# Patient Record
Sex: FEMALE | Race: WHITE | NOT HISPANIC OR LATINO | Employment: FULL TIME | URBAN - METROPOLITAN AREA
[De-identification: names, ages, dates, MRNs, and addresses within clinical notes are randomized per-mention and may not be internally consistent; named-entity substitution may affect disease eponyms.]

---

## 2017-01-10 ENCOUNTER — OFFICE VISIT (OUTPATIENT)
Dept: MEDICAL GROUP | Facility: PHYSICIAN GROUP | Age: 57
End: 2017-01-10
Payer: COMMERCIAL

## 2017-01-10 VITALS
HEIGHT: 62 IN | DIASTOLIC BLOOD PRESSURE: 68 MMHG | WEIGHT: 156 LBS | HEART RATE: 76 BPM | BODY MASS INDEX: 28.71 KG/M2 | SYSTOLIC BLOOD PRESSURE: 112 MMHG | RESPIRATION RATE: 16 BRPM | TEMPERATURE: 98.5 F | OXYGEN SATURATION: 97 %

## 2017-01-10 DIAGNOSIS — E11.69 DYSLIPIDEMIA ASSOCIATED WITH TYPE 2 DIABETES MELLITUS (HCC): ICD-10-CM

## 2017-01-10 DIAGNOSIS — E11.59 HYPERTENSION ASSOCIATED WITH DIABETES (HCC): ICD-10-CM

## 2017-01-10 DIAGNOSIS — I15.2 HYPERTENSION ASSOCIATED WITH DIABETES (HCC): ICD-10-CM

## 2017-01-10 DIAGNOSIS — E78.5 DYSLIPIDEMIA ASSOCIATED WITH TYPE 2 DIABETES MELLITUS (HCC): ICD-10-CM

## 2017-01-10 DIAGNOSIS — Z00.00 HEALTH CARE MAINTENANCE: ICD-10-CM

## 2017-01-10 DIAGNOSIS — E55.9 VITAMIN D INSUFFICIENCY: ICD-10-CM

## 2017-01-10 PROCEDURE — 99214 OFFICE O/P EST MOD 30 MIN: CPT | Performed by: NURSE PRACTITIONER

## 2017-01-10 NOTE — MR AVS SNAPSHOT
"        Tammy Alejo   1/10/2017 11:15 AM   Office Visit   MRN: [id number]    Department:  [facility] ([place]   Dept Phone:  [phone]    Description:  Female : 1960   Provider:  JEROD Sandoval           Reason for Visit     Diabetes follow up     Hypertension           Allergies as of 1/10/2017     Allergen Noted Reactions    Januvia [Sitagliptin] 2015   Unspecified    Headaches, nausea, vomiting    Sulfa Drugs 2013       Tape 2016         You were diagnosed with     Uncontrolled type 2 diabetes mellitus without complication, without long-term current use of insulin ([facility])   [6488838]       Health care maintenance   [620456]       Hypertension associated with diabetes (HCC)   [386186]       Dyslipidemia associated with type 2 diabetes mellitus ([facility])   [213793]       Vitamin D insufficiency   [139800]         Vital Signs     Blood Pressure Pulse Temperature Respirations Height Weight    112/68 mmHg 76 36.9 °C (98.5 °F) 16 1.568 m (5' 1.75\") 70.761 kg (156 lb)    Body Mass Index Oxygen Saturation Smoking Status             28.78 kg/m2 97% Never Smoker          Basic Information     Date Of Birth Sex Race Ethnicity Preferred Language    1960 Female White Unknown English      Your appointments     2017 11:15 AM   Established Patient with JEROD Sandoval   Former[facility])    [address] [place] [address], [address]  [facility] [address] [phone]           You will be receiving a confirmation call a few days before your appointment from our automated call confirmation system.              Problem List              ICD-10-CM Priority Class Noted - Resolved    Uncontrolled type 2 diabetes mellitus without complication, without long-term current use of insulin ([facility]) E11.65   6/3/2013 - Present    Hypertension associated with diabetes (HCC) E11.59, I10   6/3/2013 - Present    Dyslipidemia associated with type 2 diabetes " mellitus (HCC) E11.69, E78.5   2/10/2016 - Present    Vitamin D insufficiency E55.9   10/13/2016 - Present    Health care maintenance Z00.00   10/13/2016 - Present      Health Maintenance        Date Due Completion Dates    IMM HEP B VACCINE (1 of 3 - Primary Series) 1960 ---    IMM DTaP/Tdap/Td Vaccine (1 - Tdap) 6/18/1979 ---    IMM PNEUMOCOCCAL 19-64 (ADULT) MEDIUM RISK SERIES (1 of 1 - PPSV23) 6/18/1979 ---    IMM INFLUENZA (1) 9/1/2016 ---    A1C SCREENING 1/14/2017 7/14/2016, 10/29/2015, 3/12/2015, 9/11/2014    DIABETES MONOFILAMTENT / LE EXAM 2/10/2017 2/10/2016, 9/11/2014 (Done)    Override on 9/11/2014: Done    RETINAL SCREENING 2/26/2017 2/26/2016, 12/9/2015, 12/10/2014    MAMMOGRAM 3/20/2017 3/20/2015    URINE ACR / MICROALBUMIN 7/14/2017 7/14/2016, 3/12/2015    COLON CANCER SCREENING ANNUAL FIT 10/16/2017 10/16/2016    FASTING LIPID PROFILE 11/3/2017 11/3/2016, 7/14/2016, 10/29/2015, 11/13/2014, 11/13/2013    SERUM CREATININE 11/3/2017 11/3/2016, 7/14/2016, 10/29/2015, 11/13/2014, 11/13/2013    PAP SMEAR 11/14/2019 11/14/2016            Current Immunizations     No immunizations on file.      Below and/or attached are the medications your provider expects you to take. Review all of your home medications and newly ordered medications with your provider and/or pharmacist. Follow medication instructions as directed by your provider and/or pharmacist. Please keep your medication list with you and share with your provider. Update the information when medications are discontinued, doses are changed, or new medications (including over-the-counter products) are added; and carry medication information at all times in the event of emergency situations     Allergies:  JANUVIA - Unspecified     SULFA DRUGS - (reactions not documented)     TAPE - (reactions not documented)               Medications  Valid as of: January 10, 2017 - 12:00 PM    Generic Name Brand Name Tablet Size Instructions for use     Acetaminophen   Take  by mouth.        Atorvastatin Calcium (Tab) LIPITOR 10 MG Take 1 Tab by mouth every day.        Cetirizine HCl   Take  by mouth.        Cinnamon   Take  by mouth.        Fish Oil (Oil) Fish Oil  by Does not apply route.        GlipiZIDE (Tab) GLUCOTROL 5 MG Take 1 Tab by mouth every day. Indications: Type 2 Diabetes        Lisinopril-Hydrochlorothiazide (Tab) PRINZIDE, ZESTORETIC 20-25 MG Take 1 Tab by mouth every day.        Magnesium Gluconate   Take  by mouth.        MetFORMIN HCl (Tab) GLUCOPHAGE 1000 MG Take 1 Tab by mouth 2 times a day, with meals. Indications: Type 2 Diabetes        Potassium   Take  by mouth.        .                 Medicines prescribed today were sent to:     [facility] [address] [place] [id number]    Phone: [phone] Fax: [phone]    Open 24 Hours?: No      Medication refill instructions:       If your prescription bottle indicates you have medication refills left, it is not necessary to call your provider’s office. Please contact your pharmacy and they will refill your medication.    If your prescription bottle indicates you do not have any refills left, you may request refills at any time through one of the following ways: The online [facility] [employer] system (except Urgent Care), by calling your provider’s office, or by asking your pharmacy to contact your provider’s office with a refill request. Medication refills are processed only during regular business hours and may not be available until the next business day. Your provider may request additional information or to have a follow-up visit with you prior to refilling your medication.   *Please Note: Medication refills are assigned a new Rx number when refilled electronically. Your pharmacy may indicate that no refills were authorized even though a new prescription for the same medication is available at the pharmacy. Please request the medicine by name  with the pharmacy before contacting your provider for a refill.           MyChart Access Code: Activation code not generated  Current MyChart Status: Active

## 2017-01-10 NOTE — PROGRESS NOTES
Subjective:     Chief Complaint   Patient presents with   • Diabetes     follow up    • Hypertension        Tammy Alejo is a 56 y.o. female here today for evaluation and management of:    Uncontrolled type 2 diabetes mellitus without complication, without long-term current use of insulin (HCC)  Diagnosed 2008. Managed with metformin 1000 mg bid and glipizide 5 mg daily. Lipitor 10 mg daily.  Also takes cinnamon capsules.  No regular glucose testing.  Last A1c October 2015 6.7, 7.4 in July 2016  Did not visit lab before appt due to weather.      Health care maintenance  She had gyn appointment for exam and pap was negative and did complete FIT which was also negative.   Still declines colonoscopy and immunizations.      Hypertension associated with diabetes (HCC)  Managed with lisinopril 20 mg daily and HCTZ 12.5mg daily. Has monitoring device and does check at home.      Dyslipidemia associated with type 2 diabetes mellitus (HCC)   managed with Lipitor 10 mg daily    Vitamin D insufficiency  Now supplementing with vitamin d 1000 iu daily.         ROS   Denies HA, chest pain, shortness of breath, abdominal pain, bladder or bowel changes, lower extremity edema.    Current medicines (including changes today)  Current Outpatient Prescriptions   Medication Sig Dispense Refill   • lisinopril-hydrochlorothiazide (PRINZIDE, ZESTORETIC) 20-25 MG per tablet Take 1 Tab by mouth every day. 90 Tab 1   • atorvastatin (LIPITOR) 10 MG Tab Take 1 Tab by mouth every day. 90 Tab 3   • metformin (GLUCOPHAGE) 1000 MG tablet Take 1 Tab by mouth 2 times a day, with meals. Indications: Type 2 Diabetes 180 Tab 1   • glipiZIDE (GLUCOTROL) 5 MG Tab Take 1 Tab by mouth every day. Indications: Type 2 Diabetes 90 Tab 1   • Cetirizine HCl (ZYRTEC ALLERGY PO) Take  by mouth.     • Fish Oil Oil by Does not apply route.     • POTASSIUM PO Take  by mouth.     • Acetaminophen (TYLENOL PO) Take  by mouth.     • MAGNESIUM GLUCONATE PO Take  " by mouth.     • CINNAMON PO Take  by mouth.       No current facility-administered medications for this visit.       She  has a past medical history of Allergy; Arthritis; Diabetes; Diabetic neuropathy (HCC); Hypertension; Dyslipidemia associated with type 2 diabetes mellitus (HCC) (2/10/2016); Heart murmur; and Arrhythmia.    Allergies Januvia; Sulfa drugs; and Tape    Current medications, problem list, allergies, past medical history, and tobacco use history reviewed in [facility] today.    Health maintenance reviewed and updated.    Objective:   Blood pressure 112/68, pulse 76, temperature 36.9 °C (98.5 °F), resp. rate 16, height 1.568 m (5' 1.75\"), weight 70.761 kg (156 lb), SpO2 97 %. Body mass index is 28.78 kg/(m^2).     Physical Exam   Constitutional: Alert, no acute distress. Pleasant and cooperative with the examination.  Skin:   Warm, dry, no rashes in visible areas.    Eyes:   Pupils equal, round. Conjunctiva and sclera clear,    Lids normal.  ENT:  Pinna normal.   Neck:   Supple, trachea midline.  Lungs:  Normal effort and respirations. Clear to auscultation bilaterally.  CV:  Regular rate and rhythm.  MS/Ext:  Steady gait, no edema.  Psych:  Eye contact is good, affect calm.    Assessment and Plan:   The following treatment plan was discussed    1. Uncontrolled type 2 diabetes mellitus without complication, without long-term current use of insulin (HCC)  Continue current medications and monitoring, have labs as previously ordered. Reviewed healthy tracks.     2. Health care maintenance  Counseled regarding HM and colonoscopy.    3. Hypertension associated with diabetes (HCC)  Stable. Continue current medicines. Monitor labs regularly.        4. Dyslipidemia associated with type 2 diabetes mellitus (HCC)  Stable. Continue current medicines. Monitor labs regularly.        5. Vitamin D insufficiency  Unknown control. Have labs as previously ordered and continue supplement.      Followup: Return in about 3 " months (around 4/10/2017).  As scheduled, sooner if symptoms don't resolve or with any new problems.         Reviewed side effects, risks, and benefits of medications prescribed today.  Advised to take all medications as instructed and report any side effects.   The patient voices understanding and agrees.  Report any new or worsening symptoms.  Have labs or other diagnostic studies prior to follow up.  Keep all appointments for any referrals given.      Please note this dictation was created using voice recognition software. Every reasonable attempt has been made to correct obvious errors, however there may be errors of grammar and possibly content that were not discovered before finalizing the note.

## 2017-01-10 NOTE — ASSESSMENT & PLAN NOTE
Diagnosed 2008. Managed with metformin 1000 mg bid and glipizide 5 mg daily. Lipitor 10 mg daily.  Also takes cinnamon capsules.  No regular glucose testing.  Last A1c October 2015 6.7, 7.4 in July 2016  Did not visit lab before appt due to weather.

## 2017-01-10 NOTE — ASSESSMENT & PLAN NOTE
She had gyn appointment for exam and pap was negative and did complete FIT which was also negative.   Still declines colonoscopy and immunizations.

## 2017-01-10 NOTE — ASSESSMENT & PLAN NOTE
Managed with lisinopril 20 mg daily and HCTZ 12.5mg daily. Has monitoring device and does check at home.

## 2017-01-31 ENCOUNTER — HOSPITAL ENCOUNTER (OUTPATIENT)
Dept: LAB | Facility: MEDICAL CENTER | Age: 57
End: 2017-01-31
Attending: NURSE PRACTITIONER
Payer: COMMERCIAL

## 2017-01-31 DIAGNOSIS — E11.69 DYSLIPIDEMIA ASSOCIATED WITH TYPE 2 DIABETES MELLITUS (HCC): ICD-10-CM

## 2017-01-31 DIAGNOSIS — E55.9 VITAMIN D INSUFFICIENCY: ICD-10-CM

## 2017-01-31 DIAGNOSIS — E78.5 DYSLIPIDEMIA ASSOCIATED WITH TYPE 2 DIABETES MELLITUS (HCC): ICD-10-CM

## 2017-01-31 LAB
25(OH)D3 SERPL-MCNC: 25 NG/ML (ref 30–100)
ALBUMIN SERPL BCP-MCNC: 4.7 G/DL (ref 3.2–4.9)
ALBUMIN/GLOB SERPL: 1.7 G/DL
ALP SERPL-CCNC: 52 U/L (ref 30–99)
ALT SERPL-CCNC: 19 U/L (ref 2–50)
ANION GAP SERPL CALC-SCNC: 4 MMOL/L (ref 0–11.9)
AST SERPL-CCNC: 19 U/L (ref 12–45)
BILIRUB SERPL-MCNC: 1.3 MG/DL (ref 0.1–1.5)
BUN SERPL-MCNC: 21 MG/DL (ref 8–22)
CALCIUM SERPL-MCNC: 10 MG/DL (ref 8.5–10.5)
CHLORIDE SERPL-SCNC: 101 MMOL/L (ref 96–112)
CHOLEST SERPL-MCNC: 136 MG/DL (ref 100–199)
CO2 SERPL-SCNC: 30 MMOL/L (ref 20–33)
CREAT SERPL-MCNC: 0.77 MG/DL (ref 0.5–1.4)
EST. AVERAGE GLUCOSE BLD GHB EST-MCNC: 169 MG/DL
GLOBULIN SER CALC-MCNC: 2.8 G/DL (ref 1.9–3.5)
GLUCOSE SERPL-MCNC: 160 MG/DL (ref 65–99)
HBA1C MFR BLD: 7.5 % (ref 0–5.6)
HDLC SERPL-MCNC: 55 MG/DL
LDLC SERPL CALC-MCNC: 59 MG/DL
POTASSIUM SERPL-SCNC: 3.6 MMOL/L (ref 3.6–5.5)
PROT SERPL-MCNC: 7.5 G/DL (ref 6–8.2)
SODIUM SERPL-SCNC: 135 MMOL/L (ref 135–145)
TRIGL SERPL-MCNC: 109 MG/DL (ref 0–149)

## 2017-01-31 PROCEDURE — 80053 COMPREHEN METABOLIC PANEL: CPT

## 2017-01-31 PROCEDURE — 82306 VITAMIN D 25 HYDROXY: CPT

## 2017-01-31 PROCEDURE — 80061 LIPID PANEL: CPT

## 2017-01-31 PROCEDURE — 83036 HEMOGLOBIN GLYCOSYLATED A1C: CPT

## 2017-01-31 PROCEDURE — 36415 COLL VENOUS BLD VENIPUNCTURE: CPT

## 2017-04-12 RX ORDER — GLIPIZIDE 5 MG/1
TABLET ORAL
Qty: 90 TAB | Refills: 0 | Status: SHIPPED | OUTPATIENT
Start: 2017-04-12 | End: 2017-08-07

## 2017-04-12 RX ORDER — LISINOPRIL AND HYDROCHLOROTHIAZIDE 25; 20 MG/1; MG/1
TABLET ORAL
Qty: 90 TAB | Refills: 0 | Status: SHIPPED | OUTPATIENT
Start: 2017-04-12 | End: 2017-07-23 | Stop reason: SDUPTHER

## 2017-04-12 NOTE — TELEPHONE ENCOUNTER
Was the patient seen in the last year in this department? Yes     Does patient have an active prescription for medications requested? No     Received Request Via: Pharmacy      Pt met protocol?: Yes    A1C 1/17    BP Readings from Last 1 Encounters:   01/10/17 112/68

## 2017-04-24 ENCOUNTER — OFFICE VISIT (OUTPATIENT)
Dept: MEDICAL GROUP | Facility: PHYSICIAN GROUP | Age: 57
End: 2017-04-24
Payer: COMMERCIAL

## 2017-04-24 VITALS
TEMPERATURE: 98.1 F | OXYGEN SATURATION: 98 % | SYSTOLIC BLOOD PRESSURE: 124 MMHG | WEIGHT: 154 LBS | BODY MASS INDEX: 28.34 KG/M2 | HEART RATE: 72 BPM | HEIGHT: 62 IN | DIASTOLIC BLOOD PRESSURE: 74 MMHG | RESPIRATION RATE: 14 BRPM

## 2017-04-24 DIAGNOSIS — E11.59 HYPERTENSION ASSOCIATED WITH DIABETES (HCC): ICD-10-CM

## 2017-04-24 DIAGNOSIS — E11.69 DYSLIPIDEMIA ASSOCIATED WITH TYPE 2 DIABETES MELLITUS (HCC): ICD-10-CM

## 2017-04-24 DIAGNOSIS — E78.5 DYSLIPIDEMIA ASSOCIATED WITH TYPE 2 DIABETES MELLITUS (HCC): ICD-10-CM

## 2017-04-24 DIAGNOSIS — Z00.00 HEALTH CARE MAINTENANCE: ICD-10-CM

## 2017-04-24 DIAGNOSIS — I15.2 HYPERTENSION ASSOCIATED WITH DIABETES (HCC): ICD-10-CM

## 2017-04-24 DIAGNOSIS — E55.9 VITAMIN D INSUFFICIENCY: ICD-10-CM

## 2017-04-24 PROCEDURE — 99214 OFFICE O/P EST MOD 30 MIN: CPT | Performed by: NURSE PRACTITIONER

## 2017-04-24 NOTE — ASSESSMENT & PLAN NOTE
Diagnosed 2008. Managed with metformin 1000 mg bid and glipizide 5 mg which she splits and takes bid. Has been checking glucose at home 140-320. Notices glucose readings seem higher than before. Does limit carbs.  Job as  keeps her active. Summer gardening and swimming.

## 2017-04-24 NOTE — Clinical Note
[email or web address]  JEROD Sandoval  [address]  [place] [address]  Fax: [phone]   Authorization for Release/Disclosure of   Protected Health Information   Name: KORI DIAZ : 1960 SSN: [id number]   Address: [address] [place] [address] Phone:    [phone] (home)    I authorize the entity listed below to release/disclose the PHI below to:   [facility]/JEROD Sandoval and JEROD Sandoval   Provider or Entity Name:  Dr. Micah Carson     Address [address] [place] [address]   Phone: [phone]      Fax: [phone]     Reason for request: continuity of care   Information to be released:    [  ] LAST COLONOSCOPY,  including any PATH REPORT and follow-up  [  ] LAST FIT/COLOGUARD RESULT [  ] LAST DEXA  [  ] LAST MAMMOGRAM  [  ] LAST PAP  [  ] LAST LABS [XXX ] RETINA EXAM REPORT  [  ] IMMUNIZATION RECORDS  [  ] Release all info      [  ] Check here and initial the line next to each item to release ALL health information INCLUDING  _____ Care and treatment for drug and / or alcohol abuse  _____ HIV testing, infection status, or AIDS  _____ Genetic Testing    DATES OF SERVICE OR TIME PERIOD TO BE DISCLOSED: _____________  I understand and acknowledge that:  * This Authorization may be revoked at any time by you in writing, except if your health information has already been used or disclosed.  * Your health information that will be used or disclosed as a result of you signing this authorization could be re-disclosed by the recipient. If this occurs, your re-disclosed health information may no longer be protected by State or Federal laws.  * You may refuse to sign this Authorization. Your refusal will not affect your ability to obtain treatment.  * This Authorization becomes effective upon signing and will  on (date) __________.      If no date is indicated, this Authorization will   one (1) year from the signature date.    Name: Tammy Ramirezsusana    Signature:   Date:     2017       PLEASE FAX REQUESTED RECORDS BACK TO: [phone]

## 2017-04-24 NOTE — PROGRESS NOTES
Subjective:     Chief Complaint   Patient presents with   • Diabetes     follow up         Tammy Alejo is a 56 y.o. female here today for evaluation and management of:    Hypertension associated with diabetes (HCC)  Managed with lisinopril-HCTZ 20-25 mg daily. Has monitoring device and does check at home.      Dyslipidemia associated with type 2 diabetes mellitus (HCC)   managed with Lipitor 10 mg daily     Vitamin D insufficiency  Now supplementing with vitamin d 2000 iu daily.    Uncontrolled type 2 diabetes mellitus without complication, without long-term current use of insulin (CMS-HCC)  Diagnosed 2008. Managed with metformin 1000 mg bid and glipizide 5 mg which she splits and takes bid. Has been checking glucose at home 140-320. Notices glucose readings seem higher than before. Does limit carbs.  Job as  keeps her active. Summer gardening and swimming.            ROS  Denies HA, chest pain, shortness of breath, abdominal pain, bladder or bowel changes, lower extremity edema.    Current medicines (including changes today)  Current Outpatient Prescriptions   Medication Sig Dispense Refill   • Empagliflozin (JARDIANCE) 25 MG Tab Take 25 mg by mouth every day. 30 Tab 5   • glipiZIDE (GLUCOTROL) 5 MG Tab TAKE ONE TABLET BY MOUTH ONCE DAILY (TYPE  2  DIABETES) 90 Tab 0   • metformin (GLUCOPHAGE) 1000 MG tablet TAKE ONE TABLET BY MOUTH TWICE DAILY WITH MEALS (TYPE  2  DIABETES) 180 Tab 0   • lisinopril-hydrochlorothiazide (PRINZIDE, ZESTORETIC) 20-25 MG per tablet TAKE ONE TABLET BY MOUTH ONCE DAILY 90 Tab 0   • atorvastatin (LIPITOR) 10 MG Tab Take 1 Tab by mouth every day. 90 Tab 3   • Cetirizine HCl (ZYRTEC ALLERGY PO) Take  by mouth.     • Fish Oil Oil by Does not apply route.     • POTASSIUM PO Take  by mouth.     • Acetaminophen (TYLENOL PO) Take  by mouth.     • MAGNESIUM GLUCONATE PO Take  by mouth.     • CINNAMON PO Take  by mouth.       No current facility-administered medications for  "this visit.       She  has a past medical history of Allergy; Arthritis; Diabetes; Diabetic neuropathy (CMS-HCC); Hypertension; Dyslipidemia associated with type 2 diabetes mellitus ([facility]) (2/10/2016); Heart murmur; and Arrhythmia.    Allergies Januvia; Sulfa drugs; and Tape    Current medications, problem list, allergies, past medical history, and tobacco use history reviewed in [employer] today.    Health maintenance reviewed and updated.    Objective:   Blood pressure 124/74, pulse 72, temperature 36.7 °C (98.1 °F), resp. rate 14, height 1.568 m (5' 1.75\"), weight 69.854 kg (154 lb), SpO2 98 %. Body mass index is 28.41 kg/(m^2).     Physical Exam   Constitutional: Alert, no acute distress. Pleasant and cooperative with the examination.  Skin:   Warm, dry, no rashes in visible areas.    Eyes:   Pupils equal, round. Conjunctiva and sclera clear,    Lids normal.  ENT:  Pinna normal.   Neck:   Supple, trachea midline.  Lungs:  Normal effort and respirations. Clear to auscultation bilaterally.  CV:  Regular rate and rhythm.  MS/Ext:  Steady gait, no edema.  Psych:  Eye contact is good, affect calm.    Assessment and Plan:   The following treatment plan was discussed    1. Uncontrolled type 2 diabetes mellitus without complication, without long-term current use of insulin ([facility])  Not well controlled. Reviewed lifestyle modifications. Start jardiance, call with any concerns.  - COMP METABOLIC PANEL; Future  - HEMOGLOBIN A1C; Future  - Empagliflozin (JARDIANCE) 25 MG Tab; Take 25 mg by mouth every day.  Dispense: 30 Tab; Refill: 5    2. Hypertension associated with diabetes (HCC)  Stable. Continue current medicines. Monitor labs regularly.        3. Dyslipidemia associated with type 2 diabetes mellitus (HCC)  Stable. Continue current medicines. Monitor labs regularly.      - LIPID PROFILE; Future    4. Vitamin D insufficiency  Continue 2000 iu daily and repeat labs.  - VITAMIN D,25 HYDROXY; Future    5. Health care " maintenance    - MA-SCREEN MAMMO W/CAD-BILAT      Followup: Return in about 3 months (around 7/24/2017).  As scheduled, sooner if symptoms don't resolve or with any new problems.       Reviewed side effects, risks, and benefits of medications prescribed today.  Advised to take all medications as instructed and report any side effects.   The patient voices understanding and agrees.  Report any new or worsening symptoms.  Have labs or other diagnostic studies prior to follow up.  Keep all appointments for any referrals given.      Please note this dictation was created using voice recognition software. Every reasonable attempt has been made to correct obvious errors, however there may be errors of grammar and possibly content that were not discovered before finalizing the note.

## 2017-04-24 NOTE — MR AVS SNAPSHOT
"        Tammy Alejo   2017 11:15 AM   Office Visit   MRN: [id number]    Department:  [facility]   Dept Phone:  [phone]    Description:  Female : 1960   Provider:  JEROD Sandoval           Reason for Visit     Diabetes follow up       Allergies as of 2017     Allergen Noted Reactions    Januvia [Sitagliptin] 2015   Unspecified    Headaches, nausea, vomiting    Sulfa Drugs 2013       Tape 2016         You were diagnosed with     Health care maintenance   [356983]       Uncontrolled type 2 diabetes mellitus without complication, without long-term current use of insulin (CMS-[facility])   [6513776]       Hypertension associated with diabetes (CMS-[facility])   [954512]       Dyslipidemia associated with type 2 diabetes mellitus (CMS-[facility])   [173658]       Vitamin D insufficiency   [355927]         Vital Signs     Blood Pressure Pulse Temperature Respirations Height Weight    124/74 mmHg 72 36.7 °C (98.1 °F) 14 1.568 m (5' 1.75\") 69.854 kg (154 lb)    Body Mass Index Oxygen Saturation Smoking Status             28.41 kg/m2 98% Never Smoker          Basic Information     Date Of Birth Sex Race Ethnicity Preferred Language    1960 Female White Unknown English      Your appointments     Aug 07, 2017 11:15 AM   Established Patient with JEROD Sandoval   [facility])    [address] [place] [address], [address]  [facility] [id number] [phone]           You will be receiving a confirmation call a few days before your appointment from our automated call confirmation system.              Problem List              ICD-10-CM Priority Class Noted - Resolved    Uncontrolled type 2 diabetes mellitus without complication, without long-term current use of insulin (CMS-[facility]) E11.65   6/3/2013 - Present    Hypertension associated with diabetes (HCC) E11.59, I10   6/3/2013 - Present    Dyslipidemia associated with type 2 diabetes mellitus " (HCC) E11.69, E78.5   2/10/2016 - Present    Vitamin D insufficiency E55.9   10/13/2016 - Present    Health care maintenance Z00.00   10/13/2016 - Present      Health Maintenance        Date Due Completion Dates    IMM HEP B VACCINE (1 of 3 - Primary Series) 1960 ---    IMM DTaP/Tdap/Td Vaccine (1 - Tdap) 6/18/1979 ---    IMM PNEUMOCOCCAL 19-64 (ADULT) MEDIUM RISK SERIES (1 of 1 - PPSV23) 6/18/1979 ---    DIABETES MONOFILAMENT / LE EXAM 2/10/2017 2/10/2016, 9/11/2014 (Done)    Override on 9/11/2014: Done    MAMMOGRAM 3/20/2017 3/20/2015    URINE ACR / MICROALBUMIN 7/14/2017 7/14/2016, 3/12/2015    A1C SCREENING 7/31/2017 1/31/2017, 7/14/2016, 10/29/2015, 3/12/2015, 9/11/2014    RETINAL SCREENING 9/16/2017 9/16/2016, 2/26/2016, 12/9/2015, 12/10/2014    COLON CANCER SCREENING ANNUAL FIT 10/16/2017 10/16/2016    FASTING LIPID PROFILE 1/31/2018 1/31/2017, 11/3/2016, 7/14/2016, 10/29/2015, 11/13/2014, 11/13/2013    SERUM CREATININE 1/31/2018 1/31/2017, 11/3/2016, 7/14/2016, 10/29/2015, 11/13/2014, 11/13/2013    PAP SMEAR 11/14/2019 11/14/2016            Current Immunizations     No immunizations on file.      Below and/or attached are the medications your provider expects you to take. Review all of your home medications and newly ordered medications with your provider and/or pharmacist. Follow medication instructions as directed by your provider and/or pharmacist. Please keep your medication list with you and share with your provider. Update the information when medications are discontinued, doses are changed, or new medications (including over-the-counter products) are added; and carry medication information at all times in the event of emergency situations     Allergies:  JANUVIA - Unspecified     SULFA DRUGS - (reactions not documented)     TAPE - (reactions not documented)               Medications  Valid as of: April 24, 2017 - 12:07 PM    Generic Name Brand Name Tablet Size Instructions for use     Acetaminophen   Take  by mouth.        Atorvastatin Calcium (Tab) LIPITOR 10 MG Take 1 Tab by mouth every day.        Cetirizine HCl   Take  by mouth.        Cinnamon   Take  by mouth.        Fish Oil (Oil) Fish Oil  by Does not apply route.        GlipiZIDE (Tab) GLUCOTROL 5 MG TAKE ONE TABLET BY MOUTH ONCE DAILY (TYPE  2  DIABETES)        Lisinopril-Hydrochlorothiazide (Tab) PRINZIDE, ZESTORETIC 20-25 MG TAKE ONE TABLET BY MOUTH ONCE DAILY        Magnesium Gluconate   Take  by mouth.        MetFORMIN HCl (Tab) GLUCOPHAGE 1000 MG TAKE ONE TABLET BY MOUTH TWICE DAILY WITH MEALS (TYPE  2  DIABETES)        Potassium   Take  by mouth.        .                 Medicines prescribed today were sent to:     [facility] [address] [place] [address] [address] [id number] [facility] [address]    Phone: [phone] Fax: [phone]    Open 24 Hours?: No      Medication refill instructions:       If your prescription bottle indicates you have medication refills left, it is not necessary to call your provider’s office. Please contact your pharmacy and they will refill your medication.    If your prescription bottle indicates you do not have any refills left, you may request refills at any time through one of the following ways: The online [employer] system (except Urgent Care), by calling your provider’s office, or by asking your pharmacy to contact your provider’s office with a refill request. Medication refills are processed only during regular business hours and may not be available until the next business day. Your provider may request additional information or to have a follow-up visit with you prior to refilling your medication.   *Please Note: Medication refills are assigned a new Rx number when refilled electronically. Your pharmacy may indicate that no refills were authorized even though a new prescription for the same medication is available at the pharmacy. Please request the medicine by name with  the pharmacy before contacting your provider for a refill.        Your To Do List     Future Labs/Procedures Complete By Expires    COMP METABOLIC PANEL  As directed 4/25/2018    Comments:    8 hour fast, plain water only    HEMOGLOBIN A1C  As directed 4/25/2018    LIPID PROFILE  As directed 4/25/2018    Comments:    10 hour fast, plain water only    VITAMIN D,25 HYDROXY  As directed 4/25/2018      Other Notes About Your Plan     dddd           MyChart Access Code: Activation code not generated  Current MyChart Status: Active

## 2017-05-02 ENCOUNTER — HOSPITAL ENCOUNTER (OUTPATIENT)
Dept: LAB | Facility: MEDICAL CENTER | Age: 57
End: 2017-05-02
Attending: NURSE PRACTITIONER
Payer: COMMERCIAL

## 2017-05-02 DIAGNOSIS — E78.5 DYSLIPIDEMIA ASSOCIATED WITH TYPE 2 DIABETES MELLITUS (HCC): ICD-10-CM

## 2017-05-02 DIAGNOSIS — E11.69 DYSLIPIDEMIA ASSOCIATED WITH TYPE 2 DIABETES MELLITUS (HCC): ICD-10-CM

## 2017-05-02 DIAGNOSIS — E55.9 VITAMIN D INSUFFICIENCY: ICD-10-CM

## 2017-05-02 LAB
25(OH)D3 SERPL-MCNC: 28 NG/ML (ref 30–100)
ALBUMIN SERPL BCP-MCNC: 4.5 G/DL (ref 3.2–4.9)
ALBUMIN/GLOB SERPL: 1.5 G/DL
ALP SERPL-CCNC: 46 U/L (ref 30–99)
ALT SERPL-CCNC: 17 U/L (ref 2–50)
ANION GAP SERPL CALC-SCNC: 10 MMOL/L (ref 0–11.9)
AST SERPL-CCNC: 19 U/L (ref 12–45)
BILIRUB SERPL-MCNC: 1.4 MG/DL (ref 0.1–1.5)
BUN SERPL-MCNC: 19 MG/DL (ref 8–22)
CALCIUM SERPL-MCNC: 9.4 MG/DL (ref 8.5–10.5)
CHLORIDE SERPL-SCNC: 101 MMOL/L (ref 96–112)
CHOLEST SERPL-MCNC: 123 MG/DL (ref 100–199)
CO2 SERPL-SCNC: 28 MMOL/L (ref 20–33)
CREAT SERPL-MCNC: 0.81 MG/DL (ref 0.5–1.4)
EST. AVERAGE GLUCOSE BLD GHB EST-MCNC: 197 MG/DL
GFR SERPL CREATININE-BSD FRML MDRD: >60 ML/MIN/1.73 M 2
GLOBULIN SER CALC-MCNC: 3 G/DL (ref 1.9–3.5)
GLUCOSE SERPL-MCNC: 131 MG/DL (ref 65–99)
HBA1C MFR BLD: 8.5 % (ref 0–5.6)
HDLC SERPL-MCNC: 48 MG/DL
LDLC SERPL CALC-MCNC: 47 MG/DL
POTASSIUM SERPL-SCNC: 3.3 MMOL/L (ref 3.6–5.5)
PROT SERPL-MCNC: 7.5 G/DL (ref 6–8.2)
SODIUM SERPL-SCNC: 139 MMOL/L (ref 135–145)
TRIGL SERPL-MCNC: 140 MG/DL (ref 0–149)

## 2017-05-02 PROCEDURE — 80053 COMPREHEN METABOLIC PANEL: CPT

## 2017-05-02 PROCEDURE — 80061 LIPID PANEL: CPT

## 2017-05-02 PROCEDURE — 36415 COLL VENOUS BLD VENIPUNCTURE: CPT

## 2017-05-02 PROCEDURE — 82306 VITAMIN D 25 HYDROXY: CPT

## 2017-05-02 PROCEDURE — 83036 HEMOGLOBIN GLYCOSYLATED A1C: CPT

## 2017-07-17 ENCOUNTER — OFFICE VISIT (OUTPATIENT)
Dept: URGENT CARE | Facility: PHYSICIAN GROUP | Age: 57
End: 2017-07-17
Payer: COMMERCIAL

## 2017-07-17 VITALS
BODY MASS INDEX: 28.34 KG/M2 | SYSTOLIC BLOOD PRESSURE: 124 MMHG | DIASTOLIC BLOOD PRESSURE: 76 MMHG | WEIGHT: 154 LBS | TEMPERATURE: 98.1 F | HEART RATE: 88 BPM | OXYGEN SATURATION: 97 % | RESPIRATION RATE: 16 BRPM | HEIGHT: 62 IN

## 2017-07-17 DIAGNOSIS — J01.40 ACUTE NON-RECURRENT PANSINUSITIS: ICD-10-CM

## 2017-07-17 PROCEDURE — 99203 OFFICE O/P NEW LOW 30 MIN: CPT | Performed by: PHYSICIAN ASSISTANT

## 2017-07-17 RX ORDER — AMOXICILLIN 875 MG/1
875 TABLET, COATED ORAL 2 TIMES DAILY
Qty: 20 TAB | Refills: 0 | Status: SHIPPED | OUTPATIENT
Start: 2017-07-17 | End: 2017-08-07

## 2017-07-17 ASSESSMENT — ENCOUNTER SYMPTOMS
VOMITING: 0
ABDOMINAL PAIN: 0
HEADACHES: 1
COUGH: 0
DIZZINESS: 0
SHORTNESS OF BREATH: 0
SORE THROAT: 1
DIARRHEA: 0
NAUSEA: 0
CHILLS: 0
MYALGIAS: 0
PALPITATIONS: 0
FEVER: 0

## 2017-07-17 NOTE — MR AVS SNAPSHOT
"        Tammy Szymanskialbin   2017 5:50 PM   Office Visit   MRN: [id number]    Department:  [facility]   Dept Phone:  [phone]    Description:  Female : 1960   Provider:  Melecio Doyle PA-C           Reason for Visit     Sinus Problem pressure and draining getting worse.        Allergies as of 2017     Allergen Noted Reactions    Januvia [Sitagliptin] 2015   Unspecified    Headaches, nausea, vomiting    Sulfa Drugs 2013       Tape 2016         You were diagnosed with     Acute non-recurrent pansinusitis   [8548171]         Vital Signs     Blood Pressure Pulse Temperature Respirations Height Weight    124/76 mmHg 88 36.7 °C (98.1 °F) 16 1.568 m (5' 1.75\") 69.854 kg (154 lb)    Body Mass Index Oxygen Saturation Smoking Status             28.41 kg/m2 97% Never Smoker          Basic Information     Date Of Birth Sex Race Ethnicity Preferred Language    1960 Female White Non- English      Your appointments     Aug 07, 2017 11:15 AM   Established Patient with JEROD Sandoval   [facility]    [address], [address]  [facility] [address] [phone]           You will be receiving a confirmation call a few days before your appointment from our automated call confirmation system.              Problem List              ICD-10-CM Priority Class Noted - Resolved    Uncontrolled type 2 diabetes mellitus without complication, without long-term current use of insulin (CMS-HCC) E11.65   6/3/2013 - Present    Hypertension associated with diabetes (HCC) E11.59, I10   6/3/2013 - Present    Dyslipidemia associated with type 2 diabetes mellitus (HCC) E11.69, E78.5   2/10/2016 - Present    Vitamin D insufficiency E55.9   10/13/2016 - Present    Health care maintenance Z00.00   10/13/2016 - Present      Health Maintenance        Date Due Completion Dates    IMM HEP B VACCINE (1 of 3 - Primary Series) 1960 ---   "    IMM DTaP/Tdap/Td Vaccine (1 - Tdap) 6/18/1979 ---    IMM PNEUMOCOCCAL 19-64 (ADULT) MEDIUM RISK SERIES (1 of 1 - PPSV23) 6/18/1979 ---    DIABETES MONOFILAMENT / LE EXAM 2/10/2017 2/10/2016, 9/11/2014 (Done)    Override on 9/11/2014: Done    MAMMOGRAM 3/20/2017 3/20/2015    URINE ACR / MICROALBUMIN 7/14/2017 7/14/2016, 3/12/2015    IMM INFLUENZA (1) 9/1/2017 ---    RETINAL SCREENING 9/16/2017 9/16/2016, 2/26/2016, 12/9/2015, 12/10/2014    COLON CANCER SCREENING ANNUAL FIT 10/16/2017 10/16/2016    A1C SCREENING 11/2/2017 5/2/2017, 1/31/2017, 7/14/2016, 10/29/2015, 3/12/2015, 9/11/2014    FASTING LIPID PROFILE 5/2/2018 5/2/2017, 1/31/2017, 11/3/2016, 7/14/2016, 10/29/2015, 11/13/2014, 11/13/2013    SERUM CREATININE 5/2/2018 5/2/2017, 1/31/2017, 11/3/2016, 7/14/2016, 10/29/2015, 11/13/2014, 11/13/2013    PAP SMEAR 11/14/2019 11/14/2016            Current Immunizations     No immunizations on file.      Below and/or attached are the medications your provider expects you to take. Review all of your home medications and newly ordered medications with your provider and/or pharmacist. Follow medication instructions as directed by your provider and/or pharmacist. Please keep your medication list with you and share with your provider. Update the information when medications are discontinued, doses are changed, or new medications (including over-the-counter products) are added; and carry medication information at all times in the event of emergency situations     Allergies:  JANUVIA - Unspecified     SULFA DRUGS - (reactions not documented)     TAPE - (reactions not documented)               Medications  Valid as of: July 17, 2017 -  6:09 PM    Generic Name Brand Name Tablet Size Instructions for use    Acetaminophen   Take  by mouth.        Amoxicillin (Tab) AMOXIL 875 MG Take 1 Tab by mouth 2 times a day.        Atorvastatin Calcium (Tab) LIPITOR 10 MG Take 1 Tab by mouth every day.        Cetirizine HCl   Take  by mouth.         Cinnamon   Take  by mouth.        Empagliflozin (Tab) Empagliflozin 25 MG Take 25 mg by mouth every day.        Fish Oil (Oil) Fish Oil  by Does not apply route.        GlipiZIDE (Tab) GLUCOTROL 5 MG TAKE ONE TABLET BY MOUTH ONCE DAILY (TYPE  2  DIABETES)        Lisinopril-Hydrochlorothiazide (Tab) PRINZIDE, ZESTORETIC 20-25 MG TAKE ONE TABLET BY MOUTH ONCE DAILY        Magnesium Gluconate   Take  by mouth.        MetFORMIN HCl (Tab) GLUCOPHAGE 1000 MG TAKE ONE TABLET BY MOUTH TWICE DAILY WITH MEALS (TYPE  2  DIABETES)        Potassium   Take  by mouth.        .                 Medicines prescribed today were sent to:     [facility] [address] [place] [address] [address] [facility]    [address] [facility] [address]    Phone: [phone] Fax: [phone]    Open 24 Hours?: No      Medication refill instructions:       If your prescription bottle indicates you have medication refills left, it is not necessary to call your provider’s office. Please contact your pharmacy and they will refill your medication.    If your prescription bottle indicates you do not have any refills left, you may request refills at any time through one of the following ways: The online [employer] system (except Urgent Care), by calling your provider’s office, or by asking your pharmacy to contact your provider’s office with a refill request. Medication refills are processed only during regular business hours and may not be available until the next business day. Your provider may request additional information or to have a follow-up visit with you prior to refilling your medication.   *Please Note: Medication refills are assigned a new Rx number when refilled electronically. Your pharmacy may indicate that no refills were authorized even though a new prescription for the same medication is available at the pharmacy. Please request the medicine by name with the pharmacy before contacting your provider for a refill.        Other  Notes About Your Plan     dddd           MyChart Access Code: Activation code not generated  Current MyChart Status: Active

## 2017-07-18 NOTE — PROGRESS NOTES
"Subjective:      Tammy Alejo is a 57 y.o. female who presents with Sinus Problem    Current medications reviewed.  Past Medical History   Diagnosis Date   • Allergy      otc Allertec   • Arthritis      OA   • Diabetes    • Diabetic neuropathy (CMS-HCC)    • Hypertension    • Dyslipidemia associated with type 2 diabetes mellitus (CMS-HCC) 2/10/2016   • Heart murmur    • Arrhythmia      Social History   Substance Use Topics   • Smoking status: Never Smoker    • Smokeless tobacco: Never Used   • Alcohol Use: 0.0 oz/week     0 Shots of liquor per week      Comment: Rarely     Family History Reviewed: noncontributory      She had a URI which started mid June, it has persisted and sinus pressure/pain started increasing over 2 days with pain in bilateral maxillary teeth.      Sinus Problem  Associated symptoms include congestion, headaches and a sore throat. Pertinent negatives include no chills, coughing, ear pain or shortness of breath.       Review of Systems   Constitutional: Positive for malaise/fatigue. Negative for fever and chills.   HENT: Positive for congestion and sore throat. Negative for ear pain.    Respiratory: Negative for cough and shortness of breath.    Cardiovascular: Negative for chest pain and palpitations.   Gastrointestinal: Negative for nausea, vomiting, abdominal pain and diarrhea.   Musculoskeletal: Negative for myalgias and joint pain.   Skin: Negative for rash.   Neurological: Positive for headaches. Negative for dizziness.   All other systems reviewed and are negative.         Objective:     /76 mmHg  Pulse 88  Temp(Src) 36.7 °C (98.1 °F)  Resp 16  Ht 1.568 m (5' 1.75\")  Wt 69.854 kg (154 lb)  BMI 28.41 kg/m2  SpO2 97%     Physical Exam   Constitutional: She appears well-developed and well-nourished. No distress.   HENT:   Right Ear: Tympanic membrane and ear canal normal.   Left Ear: Tympanic membrane and ear canal normal.   Nose: Mucosal edema present. Right sinus " exhibits maxillary sinus tenderness. Right sinus exhibits no frontal sinus tenderness. Left sinus exhibits maxillary sinus tenderness. Left sinus exhibits no frontal sinus tenderness.   Mouth/Throat: Posterior oropharyngeal edema and posterior oropharyngeal erythema present. No oropharyngeal exudate.   Cardiovascular: Normal rate and regular rhythm.    Pulmonary/Chest: Effort normal and breath sounds normal. No respiratory distress.   Lymphadenopathy:     She has no cervical adenopathy.   Skin: Skin is warm and dry.               Assessment/Plan:     1. Acute non-recurrent pansinusitis  amoxicillin (AMOXIL) 875 MG tablet     Take all abx, push fluids rest.  OTC meds discussed to control symptoms.  Follow up with pcp in 2 weeks with further concerns. Patient reports understanding and agrees with plan.

## 2017-07-24 RX ORDER — LISINOPRIL AND HYDROCHLOROTHIAZIDE 25; 20 MG/1; MG/1
TABLET ORAL
Qty: 90 TAB | Refills: 0 | Status: SHIPPED | OUTPATIENT
Start: 2017-07-24 | End: 2017-10-30 | Stop reason: SDUPTHER

## 2017-07-24 NOTE — TELEPHONE ENCOUNTER
Was the patient seen in the last year in this department? Yes     Does patient have an active prescription for medications requested? No     Received Request Via: Pharmacy      Pt met protocol?: Yes, OV 4/17   BP Readings from Last 1 Encounters:   07/17/17 124/76     Lab Results   Component Value Date/Time    GLYCOHEMOGLOBIN 8.5* 05/02/2017 10:58 AM

## 2017-08-07 ENCOUNTER — HOSPITAL ENCOUNTER (OUTPATIENT)
Facility: MEDICAL CENTER | Age: 57
End: 2017-08-07
Attending: NURSE PRACTITIONER
Payer: COMMERCIAL

## 2017-08-07 ENCOUNTER — OFFICE VISIT (OUTPATIENT)
Dept: MEDICAL GROUP | Facility: PHYSICIAN GROUP | Age: 57
End: 2017-08-07
Payer: COMMERCIAL

## 2017-08-07 VITALS
TEMPERATURE: 97.4 F | BODY MASS INDEX: 27.6 KG/M2 | SYSTOLIC BLOOD PRESSURE: 126 MMHG | RESPIRATION RATE: 16 BRPM | HEIGHT: 62 IN | HEART RATE: 86 BPM | WEIGHT: 150 LBS | DIASTOLIC BLOOD PRESSURE: 74 MMHG | OXYGEN SATURATION: 96 %

## 2017-08-07 DIAGNOSIS — J01.90 ACUTE SINUSITIS TREATED WITH ANTIBIOTICS IN THE PAST 60 DAYS: ICD-10-CM

## 2017-08-07 DIAGNOSIS — Z00.00 HEALTH CARE MAINTENANCE: ICD-10-CM

## 2017-08-07 DIAGNOSIS — E55.9 VITAMIN D INSUFFICIENCY: ICD-10-CM

## 2017-08-07 DIAGNOSIS — E78.5 DYSLIPIDEMIA ASSOCIATED WITH TYPE 2 DIABETES MELLITUS (HCC): ICD-10-CM

## 2017-08-07 DIAGNOSIS — E11.69 DYSLIPIDEMIA ASSOCIATED WITH TYPE 2 DIABETES MELLITUS (HCC): ICD-10-CM

## 2017-08-07 DIAGNOSIS — I15.2 HYPERTENSION ASSOCIATED WITH DIABETES (HCC): ICD-10-CM

## 2017-08-07 DIAGNOSIS — E11.59 HYPERTENSION ASSOCIATED WITH DIABETES (HCC): ICD-10-CM

## 2017-08-07 DIAGNOSIS — Z12.39 SCREENING FOR BREAST CANCER: ICD-10-CM

## 2017-08-07 LAB
AMBIGUOUS DTTM AMBI4: NORMAL
CREAT UR-MCNC: 19 MG/DL
HBA1C MFR BLD: 8.7 % (ref ?–5.8)
INT CON NEG: NEGATIVE
INT CON POS: POSITIVE
MICROALBUMIN UR-MCNC: <0.7 MG/DL
MICROALBUMIN/CREAT UR: NORMAL MG/G (ref 0–30)

## 2017-08-07 PROCEDURE — 83036 HEMOGLOBIN GLYCOSYLATED A1C: CPT | Performed by: NURSE PRACTITIONER

## 2017-08-07 PROCEDURE — 82043 UR ALBUMIN QUANTITATIVE: CPT

## 2017-08-07 PROCEDURE — 99214 OFFICE O/P EST MOD 30 MIN: CPT | Performed by: NURSE PRACTITIONER

## 2017-08-07 PROCEDURE — 82570 ASSAY OF URINE CREATININE: CPT

## 2017-08-07 RX ORDER — FAMOTIDINE 20 MG
TABLET ORAL
COMMUNITY

## 2017-08-07 RX ORDER — DOXYCYCLINE HYCLATE 100 MG
100 TABLET ORAL 2 TIMES DAILY
Qty: 20 TAB | Refills: 0 | Status: SHIPPED | OUTPATIENT
Start: 2017-08-07 | End: 2017-08-17

## 2017-08-07 ASSESSMENT — PATIENT HEALTH QUESTIONNAIRE - PHQ9: CLINICAL INTERPRETATION OF PHQ2 SCORE: 0

## 2017-08-07 NOTE — MR AVS SNAPSHOT
"        Tammy Szymanskialbin   2017 11:15 AM   Office Visit   MRN: [id number]    Department:  [facility]   Dept Phone:  [phone]    Description:  Female : 1960   Provider:  JEROD Sandoval           Reason for Visit     Diabetes follow up     Chronic Care Management           Allergies as of 2017     Allergen Noted Reactions    Januvia [Sitagliptin] 2015   Unspecified    Headaches, nausea, vomiting    Sulfa Drugs 2013       Tape 2016         You were diagnosed with     Uncontrolled type 2 diabetes mellitus without complication, without long-term current use of insulin (CMS-HCC)   [1510137]       Dyslipidemia associated with type 2 diabetes mellitus (CMS-HCC)   [422685]       Hypertension associated with diabetes (CMS-[facility])   [447910]       Vitamin D insufficiency   [648701]       Screening for breast cancer   [560940]       Acute sinusitis treated with antibiotics in the past 60 days   [127073]         Vital Signs     Blood Pressure Pulse Temperature Respirations Height Weight    126/74 mmHg 86 36.3 °C (97.4 °F) 16 1.568 m (5' 1.75\") 68.04 kg (150 lb)    Body Mass Index Oxygen Saturation Smoking Status             27.67 kg/m2 96% Never Smoker          Basic Information     Date Of Birth Sex Race Ethnicity Preferred Language    1960 Female White Non- English      Your appointments     2017 11:15 AM   Established Patient with JEROD Sandoval   [facility])    [address] [place] [address], [address]  [facility] [id number] [phone]           You will be receiving a confirmation call a few days before your appointment from our automated call confirmation system.              Problem List              ICD-10-CM Priority Class Noted - Resolved    Uncontrolled type 2 diabetes mellitus without complication, without long-term current use of insulin (CMS-HCC) E11.65   6/3/2013 - Present    Hypertension " associated with diabetes (HCC) E11.59, I10   6/3/2013 - Present    Dyslipidemia associated with type 2 diabetes mellitus (HCC) E11.69, E78.5   2/10/2016 - Present    Vitamin D insufficiency E55.9   10/13/2016 - Present    Health care maintenance Z00.00   10/13/2016 - Present      Health Maintenance        Date Due Completion Dates    IMM HEP B VACCINE (1 of 3 - Primary Series) 1960 ---    IMM DTaP/Tdap/Td Vaccine (1 - Tdap) 6/18/1979 ---    IMM PNEUMOCOCCAL 19-64 (ADULT) MEDIUM RISK SERIES (1 of 1 - PPSV23) 6/18/1979 ---    DIABETES MONOFILAMENT / LE EXAM 2/10/2017 2/10/2016, 9/11/2014 (Done)    Override on 9/11/2014: Done    MAMMOGRAM 3/20/2017 3/20/2015    URINE ACR / MICROALBUMIN 7/14/2017 7/14/2016, 3/12/2015    IMM INFLUENZA (1) 9/1/2017 ---    RETINAL SCREENING 9/16/2017 9/16/2016, 2/26/2016, 12/9/2015, 12/10/2014    COLON CANCER SCREENING ANNUAL FIT 10/16/2017 10/16/2016    A1C SCREENING 11/2/2017 5/2/2017, 1/31/2017, 7/14/2016, 10/29/2015, 3/12/2015, 9/11/2014    FASTING LIPID PROFILE 5/2/2018 5/2/2017, 1/31/2017, 11/3/2016, 7/14/2016, 10/29/2015, 11/13/2014, 11/13/2013    SERUM CREATININE 5/2/2018 5/2/2017, 1/31/2017, 11/3/2016, 7/14/2016, 10/29/2015, 11/13/2014, 11/13/2013    PAP SMEAR 11/14/2019 11/14/2016            Results     POCT A1C      Component    Glycohemoglobin    8.7    Internal Control Negative    Negative    Internal Control Positive    Positive                        Current Immunizations     No immunizations on file.      Below and/or attached are the medications your provider expects you to take. Review all of your home medications and newly ordered medications with your provider and/or pharmacist. Follow medication instructions as directed by your provider and/or pharmacist. Please keep your medication list with you and share with your provider. Update the information when medications are discontinued, doses are changed, or new medications (including over-the-counter products) are  added; and carry medication information at all times in the event of emergency situations     Allergies:  JANUVIA - Unspecified     SULFA DRUGS - (reactions not documented)     TAPE - (reactions not documented)               Medications  Valid as of: August 07, 2017 - 12:13 PM    Generic Name Brand Name Tablet Size Instructions for use    Acetaminophen   Take  by mouth.        Atorvastatin Calcium (Tab) LIPITOR 10 MG Take 1 Tab by mouth every day.        Cetirizine HCl   Take  by mouth.        Cholecalciferol (Cap) Vitamin D (Cholecalciferol) 1000 UNITS Take  by mouth.        Cinnamon   Take  by mouth.        Doxycycline Hyclate (Tab) VIBRAMYCIN 100 MG Take 1 Tab by mouth 2 times a day for 10 days.        Empagliflozin (Tab) Empagliflozin 25 MG Take 25 mg by mouth every day.        Fish Oil (Oil) Fish Oil  by Does not apply route.        Lisinopril-Hydrochlorothiazide (Tab) PRINZIDE, ZESTORETIC 20-25 MG TAKE ONE TABLET BY MOUTH ONCE DAILY        Magnesium Gluconate   Take  by mouth.        MetFORMIN HCl (Tab) GLUCOPHAGE 1000 MG TAKE ONE TABLET BY MOUTH TWICE DAILY WITH MEALS FOR TYPE 2 DIABETES        Potassium   Take  by mouth.        .                 Medicines prescribed today were sent to:     [facility] [address] [place] [address]    Phone: [phone] Fax: [phone]    Open 24 Hours?: No      Medication refill instructions:       If your prescription bottle indicates you have medication refills left, it is not necessary to call your provider’s office. Please contact your pharmacy and they will refill your medication.    If your prescription bottle indicates you do not have any refills left, you may request refills at any time through one of the following ways: The online [employer] system (except Urgent Care), by calling your provider’s office, or by asking your pharmacy to contact your provider’s office with a refill request. Medication refills  are processed only during regular business hours and may not be available until the next business day. Your provider may request additional information or to have a follow-up visit with you prior to refilling your medication.   *Please Note: Medication refills are assigned a new Rx number when refilled electronically. Your pharmacy may indicate that no refills were authorized even though a new prescription for the same medication is available at the pharmacy. Please request the medicine by name with the pharmacy before contacting your provider for a refill.        Your To Do List     Future Labs/Procedures Complete By Expires    MICROALBUMIN CREAT RATIO URINE (CLINIC COLLECT)  As directed 8/7/2018      Other Notes About Your Plan     dddd           MyChart Access Code: Activation code not generated  Current [employer] Status: Active

## 2017-08-07 NOTE — PROGRESS NOTES
Subjective:     Chief Complaint   Patient presents with   • Diabetes     follow up    • Chronic Care Management     Tammy Alejo is a 57 y.o. female here today for evaluation and management of issues listed below.    Uncontrolled type 2 diabetes mellitus without complication, without long-term current use of insulin (CMS-[facility])  Diagnosed 2008. Managed with metformin 1000 mg bid and glipizide 5 mg which she was taking 1/2 bid. Most recently we added Jardiance 5 mg daily to her medications, unfortunately she stopped taking the glipizide. Has been checking glucose at home some improvement with Jardiance, no readings over 200.  Does limit simple carbs.  Job as  keeps her active. Summer gardening and swimming.   Lipitor 10 mg daily.  Last A1c October 2015 6.7, 7.4 in July 2016,  January 2017 level 7.5, May 2 increased 8.5. Today A1c 8.7.     Hypertension associated with diabetes ([facility])  Managed with lisinopril-HCTZ 20-25 mg daily.     Health care maintenance  Did not schedule Mammogram after last visit.      Dyslipidemia associated with type 2 diabetes mellitus ([facility])  Managed with Lipitor 10 mg daily     Vitamin D insufficiency  Now supplementing with vitamin d 2000 iu daily, recent level increased to 28.     acute sinusitis and reports her symptoms improved during treatment. She's had recurrence of nasal and sinus congestion and pressure worse on the right for the last couple of days. She also has a sore throat and feels generally tired.     1. Uncontrolled type 2 diabetes mellitus without complication, without long-term current use of insulin (CMS-[facility])    2. Dyslipidemia associated with type 2 diabetes mellitus ([facility])    3. Hypertension associated with diabetes ([facility])    4. Vitamin D insufficiency    5. Acute sinusitis treated with antibiotics in the past 60 days    6. Health care maintenance    7. Screening for breast cancer        Allergies: Januvia; Sulfa drugs; and Tape  Current medicines (including  "changes today)  Current Outpatient Prescriptions   Medication Sig Dispense Refill   • Vitamin D, Cholecalciferol, 1000 UNITS Cap Take  by mouth.     • doxycycline (VIBRAMYCIN) 100 MG Tab Take 1 Tab by mouth 2 times a day for 10 days. 20 Tab 0   • lisinopril-hydrochlorothiazide (PRINZIDE, ZESTORETIC) 20-25 MG per tablet TAKE ONE TABLET BY MOUTH ONCE DAILY 90 Tab 0   • metformin (GLUCOPHAGE) 1000 MG tablet TAKE ONE TABLET BY MOUTH TWICE DAILY WITH MEALS FOR TYPE 2 DIABETES 180 Tab 0   • Empagliflozin (JARDIANCE) 25 MG Tab Take 25 mg by mouth every day. 30 Tab 5   • atorvastatin (LIPITOR) 10 MG Tab Take 1 Tab by mouth every day. 90 Tab 3   • Cetirizine HCl (ZYRTEC ALLERGY PO) Take  by mouth.     • Fish Oil Oil by Does not apply route.     • POTASSIUM PO Take  by mouth.     • MAGNESIUM GLUCONATE PO Take  by mouth.     • Acetaminophen (TYLENOL PO) Take  by mouth.     • CINNAMON PO Take  by mouth.       No current facility-administered medications for this visit.       She  has a past medical history of Allergy; Arthritis; Diabetes; Diabetic neuropathy (CMS-[facility]); Hypertension; Dyslipidemia associated with type 2 diabetes mellitus (CMS-[facility]) (2/10/2016); Heart murmur; and Arrhythmia.    ROS   Denies chest pain, shortness of breath, abdominal pain, bladder or bowel changes, lower extremity edema.    Health Maintenance: Reviewed and updated.      Objective:   Blood pressure 126/74, pulse 86, temperature 36.3 °C (97.4 °F), resp. rate 16, height 1.568 m (5' 1.75\"), weight 68.04 kg (150 lb), SpO2 96 %. Body mass index is 27.67 kg/(m^2).  Physical Exam   Alert, no acute distress. Pleasant and cooperative with the examination.  Eye contact is good, affect calm.  Skin: Warm, dry, no rashes in visible areas.    Eyes: Pupils equal, round. Conjunctiva and sclera clear, lids normal.  ENT: Pinna normal. TM intact. Oral mucous membranes pink and moist.  Neck: Supple, trachea midline.  Lungs: Normal effort and respirations. MS/Ext: Steady " gait, no edema.    Results reviewed  lab results 5-17 and January 2017    Assessment and Plan:   Treatment Changes: She will resume glipizide and continue other medications and regular monitoring of her glucose. She will continue other medications. Discussed watchful waiting versus use of doxycycline which was prescribed today. Is advised to follow-up with any worsening symptoms or nonresolution. Differential diagnosis, natural history, treatment options, supportive care, and indications for immediate follow-up discussed at length.   Tammy was seen today for diabetes and chronic care management.    Diagnoses and all orders for this visit:    Uncontrolled type 2 diabetes mellitus without complication, without long-term current use of insulin (CMS-[facility])  -     POCT A1C  -     MICROALBUMIN CREAT RATIO URINE (CLINIC COLLECT); Future    Dyslipidemia associated with type 2 diabetes mellitus ([facility])    Hypertension associated with diabetes ([facility])    Vitamin D insufficiency    Acute sinusitis treated with antibiotics in the past 60 days  -     doxycycline (VIBRAMYCIN) 100 MG Tab; Take 1 Tab by mouth 2 times a day for 10 days.    [facility]    Screening for breast cancer  -     MA-SCREEN MAMMO W/CAD-BILAT        Followup: Return in about 3 months (around 11/7/2017) for DM. Sooner should new symptoms or problems arise, or as previously scheduled.       Reviewed side effects, risks, and benefits of medications prescribed today.  Advised to take all medications as instructed and report any side effects.   The patient voices understanding and agrees.  Report any new or worsening symptoms.  Have labs or other diagnostic studies prior to follow up.  Keep all appointments for any referrals given.    Please note this dictation was created using voice recognition software. Every reasonable attempt has been made to correct obvious errors, however there may be errors of grammar and possibly content that were not discovered  before finalizing the note.

## 2017-08-07 NOTE — ASSESSMENT & PLAN NOTE
Diagnosed 2008. Managed with metformin 1000 mg bid and glipizide 5 mg which she was taking 1/2 bid. Most recently we added Jardiance 5 mg daily to her medications, unfortunately she stopped taking the glipizide. Has been checking glucose at home some improvement with Jardiance, no readings over 200.  Does limit simple carbs.  Job as  keeps her active. Summer gardening and swimming.   Lipitor 10 mg daily.  Last A1c October 2015 6.7, 7.4 in July 2016,  January 2017 level 7.5, May 2 increased 8.5. Today A1c 8.7.

## 2017-09-16 ENCOUNTER — HOSPITAL ENCOUNTER (OUTPATIENT)
Facility: MEDICAL CENTER | Age: 57
End: 2017-09-16
Payer: COMMERCIAL

## 2017-09-16 LAB
ALBUMIN SERPL BCP-MCNC: 4.5 G/DL (ref 3.2–4.9)
ALBUMIN/GLOB SERPL: 1.4 G/DL
ALP SERPL-CCNC: 51 U/L (ref 30–99)
ALT SERPL-CCNC: 15 U/L (ref 2–50)
ANION GAP SERPL CALC-SCNC: 12 MMOL/L (ref 0–11.9)
AST SERPL-CCNC: 15 U/L (ref 12–45)
BDY FAT % MEASURED: 37.2 %
BILIRUB SERPL-MCNC: 1.4 MG/DL (ref 0.1–1.5)
BP DIAS: 70 MMHG
BP SYS: 118 MMHG
BUN SERPL-MCNC: 20 MG/DL (ref 8–22)
CALCIUM SERPL-MCNC: 10.2 MG/DL (ref 8.5–10.5)
CHLORIDE SERPL-SCNC: 98 MMOL/L (ref 96–112)
CHOLEST SERPL-MCNC: 146 MG/DL (ref 100–199)
CO2 SERPL-SCNC: 28 MMOL/L (ref 20–33)
CREAT SERPL-MCNC: 0.9 MG/DL (ref 0.5–1.4)
DIABETES HTDIA: YES
EVENT NAME HTEVT: NORMAL
GFR SERPL CREATININE-BSD FRML MDRD: >60 ML/MIN/1.73 M 2
GLOBULIN SER CALC-MCNC: 3.3 G/DL (ref 1.9–3.5)
GLUCOSE SERPL-MCNC: 111 MG/DL (ref 65–99)
HDLC SERPL-MCNC: 51 MG/DL
HYPERTENSION HTHYP: YES
LDLC SERPL CALC-MCNC: 66 MG/DL
POTASSIUM SERPL-SCNC: 3.9 MMOL/L (ref 3.6–5.5)
PROT SERPL-MCNC: 7.8 G/DL (ref 6–8.2)
SCREENING LOC CITY HTCIT: NORMAL
SCREENING LOC STATE HTSTA: NORMAL
SCREENING LOCATION HTLOC: NORMAL
SODIUM SERPL-SCNC: 138 MMOL/L (ref 135–145)
SUBSCRIBER ID HTSID: NORMAL
TRIGL SERPL-MCNC: 146 MG/DL (ref 0–149)

## 2017-10-03 RX ORDER — GLIPIZIDE 5 MG/1
TABLET ORAL
Qty: 90 TAB | Refills: 0 | Status: SHIPPED | OUTPATIENT
Start: 2017-10-03 | End: 2018-01-25 | Stop reason: SDUPTHER

## 2017-10-30 DIAGNOSIS — E78.5 DYSLIPIDEMIA ASSOCIATED WITH TYPE 2 DIABETES MELLITUS (HCC): ICD-10-CM

## 2017-10-30 DIAGNOSIS — E11.69 DYSLIPIDEMIA ASSOCIATED WITH TYPE 2 DIABETES MELLITUS (HCC): ICD-10-CM

## 2017-10-31 NOTE — TELEPHONE ENCOUNTER
Was the patient seen in the last year in this department? Yes     Does patient have an active prescription for medications requested? No     Received Request Via: Pharmacy      Pt met protocol?: Yes Last Ov 08/2017  BP Readings from Last 1 Encounters:   08/07/17 126/74     Lab Results   Component Value Date/Time    HBA1C 8.7 08/07/2017 11:49 AM      Lab Results   Component Value Date/Time    HDL 51 09/16/2017 11:00 AM

## 2017-11-01 RX ORDER — ATORVASTATIN CALCIUM 10 MG/1
TABLET, FILM COATED ORAL
Qty: 90 TAB | Refills: 0 | Status: SHIPPED | OUTPATIENT
Start: 2017-11-01 | End: 2018-01-25 | Stop reason: SDUPTHER

## 2017-11-01 RX ORDER — LISINOPRIL AND HYDROCHLOROTHIAZIDE 25; 20 MG/1; MG/1
TABLET ORAL
Qty: 90 TAB | Refills: 0 | Status: SHIPPED | OUTPATIENT
Start: 2017-11-01 | End: 2018-01-25 | Stop reason: SDUPTHER

## 2017-11-01 RX ORDER — EMPAGLIFLOZIN 25 MG/1
TABLET, FILM COATED ORAL
Qty: 90 TAB | Refills: 0 | Status: SHIPPED | OUTPATIENT
Start: 2017-11-01 | End: 2018-01-15 | Stop reason: SDUPTHER

## 2017-11-03 ENCOUNTER — TELEPHONE (OUTPATIENT)
Dept: MEDICAL GROUP | Facility: PHYSICIAN GROUP | Age: 57
End: 2017-11-03

## 2017-11-03 NOTE — TELEPHONE ENCOUNTER
Samples of Jardiance are available for free at the [facility] pharmacy. Please let me know if patient would like medication sent to [facility] pharmacy on [address]

## 2017-11-16 ENCOUNTER — TELEPHONE (OUTPATIENT)
Dept: MEDICAL GROUP | Facility: PHYSICIAN GROUP | Age: 57
End: 2017-11-16

## 2017-11-16 NOTE — TELEPHONE ENCOUNTER
MEDICATION PRIOR AUTHORIZATION NEEDED:    1. Name of Medication: JARDIANCE    2. Requested By (Name of Pharmacy): [facility]     3. Is insurance on file current? YES    4. What is the name & phone number of the 3rd party payor? [email or web address]

## 2017-12-26 ENCOUNTER — OFFICE VISIT (OUTPATIENT)
Dept: MEDICAL GROUP | Facility: PHYSICIAN GROUP | Age: 57
End: 2017-12-26
Payer: COMMERCIAL

## 2017-12-26 VITALS
TEMPERATURE: 98.1 F | WEIGHT: 155 LBS | BODY MASS INDEX: 29.27 KG/M2 | OXYGEN SATURATION: 97 % | HEIGHT: 61 IN | SYSTOLIC BLOOD PRESSURE: 116 MMHG | DIASTOLIC BLOOD PRESSURE: 70 MMHG | HEART RATE: 82 BPM

## 2017-12-26 DIAGNOSIS — I15.2 HYPERTENSION ASSOCIATED WITH DIABETES (HCC): ICD-10-CM

## 2017-12-26 DIAGNOSIS — E55.9 VITAMIN D INSUFFICIENCY: ICD-10-CM

## 2017-12-26 DIAGNOSIS — Z12.11 SCREEN FOR COLON CANCER: ICD-10-CM

## 2017-12-26 DIAGNOSIS — E11.59 HYPERTENSION ASSOCIATED WITH DIABETES (HCC): ICD-10-CM

## 2017-12-26 DIAGNOSIS — E78.5 DYSLIPIDEMIA ASSOCIATED WITH TYPE 2 DIABETES MELLITUS (HCC): ICD-10-CM

## 2017-12-26 DIAGNOSIS — E11.69 DYSLIPIDEMIA ASSOCIATED WITH TYPE 2 DIABETES MELLITUS (HCC): ICD-10-CM

## 2017-12-26 LAB
HBA1C MFR BLD: 7.7 % (ref ?–5.8)
INT CON NEG: NEGATIVE
INT CON POS: POSITIVE

## 2017-12-26 PROCEDURE — 99214 OFFICE O/P EST MOD 30 MIN: CPT | Performed by: NURSE PRACTITIONER

## 2017-12-26 PROCEDURE — 83036 HEMOGLOBIN GLYCOSYLATED A1C: CPT | Performed by: NURSE PRACTITIONER

## 2017-12-27 NOTE — ASSESSMENT & PLAN NOTE
She was not given refill of Jardiance in November, pharmacy told her that authorization was required. She did not call or follow up since November appt was rescheduled. She had been taking that for at least 6 months without problems.

## 2017-12-27 NOTE — PROGRESS NOTES
Subjective:     Chief Complaint   Patient presents with   • Follow-Up   • Diabetes Mellitus   • Chronic Care Management     Tammy Alejo is a 57 y.o. female here today for evaluation and management of issues listed below.    Hypertension associated with diabetes (HCC)  Managed with lisinopril-HCTZ 20-25 mg daily. Has monitoring device and does check at home.    Dyslipidemia associated with type 2 diabetes mellitus (HCC)  Managed with Lipitor 10 mg daily     Uncontrolled type 2 diabetes mellitus without complication, without long-term current use of insulin (CMS-[facility])  She was not given refill of Jardiance in November, pharmacy told her that authorization was required. She did not call or follow up since November appt was rescheduled. She had been taking that for at least 6 months without problems.         Lab Results   Component Value Date/Time    HBA1C 7.7 12/26/2017 05:23 PM    HBA1C 8.7 08/07/2017 11:49 AM       1. Uncontrolled type 2 diabetes mellitus without complication, without long-term current use of insulin (CMS-[facility])    2. Hypertension associated with diabetes (HCC)    3. Dyslipidemia associated with type 2 diabetes mellitus (HCC)    4. Vitamin D insufficiency    5. Screen for colon cancer        ROS   Denies HA, chest pain, increased shortness of breath, abdominal pain, bladder or bowel changes, lower extremity edema. All other pertinent systems reviewed and are negative except as in HPI.    Allergies: Januvia [sitagliptin]; Sulfa drugs; and Tape  Current medicines (including changes today)  Current Outpatient Prescriptions   Medication Sig Dispense Refill   • lisinopril-hydrochlorothiazide (PRINZIDE, ZESTORETIC) 20-25 MG per tablet TAKE ONE TABLET BY MOUTH ONCE DAILY 90 Tab 0   • atorvastatin (LIPITOR) 10 MG Tab TAKE ONE TABLET BY MOUTH ONCE DAILY 90 Tab 0   • metformin (GLUCOPHAGE) 1000 MG tablet TAKE ONE TABLET BY MOUTH TWICE DAILY WITH MEALS FOR  TYPE  2  DIABETES 180 Tab 0   • glipiZIDE  "(GLUCOTROL) 5 MG Tab TAKE ONE TABLET BY MOUTH ONCE DAILY (TYPE  2  DIABETES) 90 Tab 0   • Vitamin D, Cholecalciferol, 1000 UNITS Cap Take  by mouth.     • Cetirizine HCl (ZYRTEC ALLERGY PO) Take  by mouth.     • Fish Oil Oil by Does not apply route.     • POTASSIUM PO Take  by mouth.     • Acetaminophen (TYLENOL PO) Take  by mouth.     • MAGNESIUM GLUCONATE PO Take  by mouth.     • JARDIANCE 25 MG Tab TAKE ONE TABLET BY MOUTH ONCE DAILY (Patient not taking: Reported on 12/26/2017) 90 Tab 0     No current facility-administered medications for this visit.        She  has a past medical history of Allergy; Arrhythmia; Arthritis; Diabetes; Diabetic neuropathy (CMS-[facility]); Dyslipidemia associated with type 2 diabetes mellitus (CMS-HCC) (2/10/2016); Heart murmur; and Hypertension.      Health Maintenance: Reviewed and updated.      Objective:   Blood pressure 116/70, pulse 82, temperature 36.7 °C (98.1 °F), height 1.549 m (5' 1\"), weight 70.3 kg (155 lb), SpO2 97 %. Body mass index is 29.29 kg/m².  Physical Exam   Alert, no acute distress. Pleasant and cooperative with the examination.  Eye contact is good, affect calm.  Skin: Warm, dry, no rashes in visible areas.    Eyes: Pupils equal, round. Conjunctiva and sclera clear, lids normal.  ENT: Pinna normal.  Neck: Supple, trachea midline.  Lungs: Normal effort and respirations. Clear to auscultation bilaterally.   Abdomen: No CVAT   CV: Regular rate and rhythm.  MS/Ext: Steady gait, no edema.    Results reviewed  labs    Assessment and Plan:   Treatment:    Tammy was seen today for follow-up, diabetes mellitus and chronic care management.    Diagnoses and all orders for this visit:    Uncontrolled type 2 diabetes mellitus without complication, without long-term current use of insulin (CMS-HCC)  Uncontrolled - advised to follow up with any medication concerns or problems.  Obtain and continue all medications. We'll continue to monitor.-     POCT Hemoglobin " A1C    Hypertension associated with diabetes (HCC)  Stable. Continue current medicines. Monitor labs regularly.   Dyslipidemia associated with type 2 diabetes mellitus (HCC)  Stable. Continue current medicines. Monitor labs regularly.     Vitamin D insufficiency  Continue daily supplement. We'll continue to monitor.  Screen for colon cancer  -     OCCULT BLOOD FECES IMMUNOASSAY (FIT); Future        Followup: No Follow-up on file. Sooner should new symptoms or problems arise, or as previously scheduled.         Reviewed side effects, risks, and benefits of medications prescribed today.  Advised to take all medications as instructed and report any side effects.   The patient voices understanding and agrees.  Report any new or worsening symptoms.  Have labs or other diagnostic studies prior to follow up.  Keep all appointments for any referrals given.        Please note this dictation was created using voice recognition software. Every reasonable attempt has been made to correct obvious errors, however there may be errors of grammar and possibly content that were not discovered before finalizing the note.

## 2018-01-06 NOTE — TELEPHONE ENCOUNTER
Was the patient seen in the last year in this department? Yes     Does patient have an active prescription for medications requested? No     Received Request Via: Patient     Prescription needs to say:  Use sample on sig     They only have 7 tabs of the 25mg as of 01/05/2018 I called to check and see if they knew when they would be getting more 25Mg tabs. They stated that they do not know and to check back daily. The also have the 10Mg tabs available and they have 182 tabs as of 01/05/2018

## 2018-01-10 NOTE — TELEPHONE ENCOUNTER
Requested Prescriptions     Signed Prescriptions Disp Refills   • Empagliflozin (JARDIANCE) 10 MG Tab 75 Tab 0     Sig: Take 25 mg by mouth every day. Dispense sample     Authorizing Provider: BARBARA TOURE     RX sent to pharmacy.  COLLEEN Sandoval.

## 2018-01-15 NOTE — TELEPHONE ENCOUNTER
Please monitor for new PA  Requested Prescriptions     Signed Prescriptions Disp Refills   • Empagliflozin (JARDIANCE) 10 MG Tab 75 Tab 0     Sig: Take 25 mg by mouth every day. Dispense sample     Authorizing Provider: BARBARA TOURE   • Empagliflozin (JARDIANCE) 25 MG Tab 90 Tab 1     Sig: Take 1 Tab by mouth every day.     Authorizing Provider: BARBARA TOURE     RX sent to pharmacy.  COLLEEN Sandoval.

## 2018-01-15 NOTE — TELEPHONE ENCOUNTER
PA entered on cover my meds never responded to, we have spoke with insurance coverage and they state they will send a form to fill. I have spoke with them 3 times and have still not yet received a form to complete PA.

## 2018-01-23 ENCOUNTER — TELEPHONE (OUTPATIENT)
Dept: MEDICAL GROUP | Facility: PHYSICIAN GROUP | Age: 58
End: 2018-01-23

## 2018-01-25 DIAGNOSIS — E11.69 DYSLIPIDEMIA ASSOCIATED WITH TYPE 2 DIABETES MELLITUS (HCC): ICD-10-CM

## 2018-01-25 DIAGNOSIS — E78.5 DYSLIPIDEMIA ASSOCIATED WITH TYPE 2 DIABETES MELLITUS (HCC): ICD-10-CM

## 2018-01-26 RX ORDER — ATORVASTATIN CALCIUM 10 MG/1
TABLET, FILM COATED ORAL
Qty: 90 TAB | Refills: 0 | Status: SHIPPED | OUTPATIENT
Start: 2018-01-26 | End: 2018-04-30 | Stop reason: SDUPTHER

## 2018-01-26 RX ORDER — GLIPIZIDE 5 MG/1
TABLET ORAL
Qty: 90 TAB | Refills: 0 | Status: SHIPPED | OUTPATIENT
Start: 2018-01-26 | End: 2018-04-30 | Stop reason: SDUPTHER

## 2018-01-26 RX ORDER — LISINOPRIL AND HYDROCHLOROTHIAZIDE 25; 20 MG/1; MG/1
TABLET ORAL
Qty: 90 TAB | Refills: 0 | Status: SHIPPED | OUTPATIENT
Start: 2018-01-26 | End: 2018-04-30 | Stop reason: SDUPTHER

## 2018-01-26 NOTE — TELEPHONE ENCOUNTER
Was the patient seen in the last year in this department? Yes     Does patient have an active prescription for medications requested? No     Received Request Via: Pharmacy      Pt met protocol?: Yes    OV 12/17    BP Readings from Last 1 Encounters:   12/26/17 116/70

## 2018-02-28 RX ORDER — LISINOPRIL AND HYDROCHLOROTHIAZIDE 25; 20 MG/1; MG/1
TABLET ORAL
Refills: 0 | OUTPATIENT
Start: 2018-02-28

## 2018-04-30 DIAGNOSIS — E11.69 DYSLIPIDEMIA ASSOCIATED WITH TYPE 2 DIABETES MELLITUS (HCC): ICD-10-CM

## 2018-04-30 DIAGNOSIS — E78.5 DYSLIPIDEMIA ASSOCIATED WITH TYPE 2 DIABETES MELLITUS (HCC): ICD-10-CM

## 2018-05-01 RX ORDER — GLIPIZIDE 5 MG/1
TABLET ORAL
Qty: 90 TAB | Refills: 0 | Status: SHIPPED | OUTPATIENT
Start: 2018-05-01 | End: 2018-08-06 | Stop reason: SDUPTHER

## 2018-05-01 RX ORDER — ATORVASTATIN CALCIUM 10 MG/1
TABLET, FILM COATED ORAL
Qty: 90 TAB | Refills: 0 | Status: SHIPPED | OUTPATIENT
Start: 2018-05-01 | End: 2018-07-28 | Stop reason: SDUPTHER

## 2018-05-01 RX ORDER — LISINOPRIL AND HYDROCHLOROTHIAZIDE 25; 20 MG/1; MG/1
TABLET ORAL
Qty: 90 TAB | Refills: 0 | Status: SHIPPED | OUTPATIENT
Start: 2018-05-01 | End: 2018-07-28 | Stop reason: SDUPTHER

## 2018-05-01 NOTE — TELEPHONE ENCOUNTER
Was the patient seen in the last year in this department? Yes     Does patient have an active prescription for medications requested? No     Received Request Via: Pharmacy    Pt met protocol?: Yes     Last OV 12/2017  BP Readings from Last 1 Encounters:   12/26/17 116/70     Lab Results   Component Value Date/Time    HBA1C 7.7 12/26/2017 05:23 PM     Lab Results  Component Value Date/Time   CHOLSTRLTOT 146 09/16/2017 1100   TRIGLYCERIDE 146 09/16/2017 1100   HDL 51 09/16/2017 1100   LDL 66 09/16/2017 1100

## 2018-06-05 ENCOUNTER — TELEPHONE (OUTPATIENT)
Dept: MEDICAL GROUP | Facility: PHYSICIAN GROUP | Age: 58
End: 2018-06-05

## 2018-06-06 ENCOUNTER — OFFICE VISIT (OUTPATIENT)
Dept: MEDICAL GROUP | Facility: PHYSICIAN GROUP | Age: 58
End: 2018-06-06
Payer: COMMERCIAL

## 2018-06-06 ENCOUNTER — HOSPITAL ENCOUNTER (OUTPATIENT)
Dept: LAB | Facility: MEDICAL CENTER | Age: 58
End: 2018-06-06
Attending: PHYSICIAN ASSISTANT
Payer: COMMERCIAL

## 2018-06-06 VITALS
OXYGEN SATURATION: 97 % | TEMPERATURE: 97.7 F | DIASTOLIC BLOOD PRESSURE: 70 MMHG | HEIGHT: 61 IN | BODY MASS INDEX: 28.89 KG/M2 | SYSTOLIC BLOOD PRESSURE: 106 MMHG | WEIGHT: 153 LBS | HEART RATE: 76 BPM

## 2018-06-06 DIAGNOSIS — E11.69 DYSLIPIDEMIA ASSOCIATED WITH TYPE 2 DIABETES MELLITUS (HCC): ICD-10-CM

## 2018-06-06 DIAGNOSIS — I15.2 HYPERTENSION ASSOCIATED WITH DIABETES (HCC): ICD-10-CM

## 2018-06-06 DIAGNOSIS — E55.9 VITAMIN D INSUFFICIENCY: ICD-10-CM

## 2018-06-06 DIAGNOSIS — E11.59 HYPERTENSION ASSOCIATED WITH DIABETES (HCC): ICD-10-CM

## 2018-06-06 DIAGNOSIS — E78.5 DYSLIPIDEMIA ASSOCIATED WITH TYPE 2 DIABETES MELLITUS (HCC): ICD-10-CM

## 2018-06-06 LAB
EST. AVERAGE GLUCOSE BLD GHB EST-MCNC: 163 MG/DL
HBA1C MFR BLD: 7.3 % (ref 0–5.6)

## 2018-06-06 PROCEDURE — 99214 OFFICE O/P EST MOD 30 MIN: CPT | Performed by: PHYSICIAN ASSISTANT

## 2018-06-06 PROCEDURE — 83036 HEMOGLOBIN GLYCOSYLATED A1C: CPT

## 2018-06-06 PROCEDURE — 36415 COLL VENOUS BLD VENIPUNCTURE: CPT

## 2018-06-06 NOTE — PROGRESS NOTES
Subjective:   Tammy Alejo is a 57 y.o. female here today for follow-up on diabetes and other chronic conditions. Is a new patient to me and is also establishing care today.    Previous PCP: DARSHAN Fenton    HPI: Patient has the following current medical problems:    Uncontrolled type 2 diabetes mellitus without complication, without long-term current use of insulin (CMS-[facility]) (HCC)  Treated with Metformin 1000 mg BID, glipizide 2.5 mg BID, and Jardiance 10 Has been on Jardiance 25 mg daily. Last A1c 7.7. Patient states at that time, had been off Jardiance due to insurance reasons. Has now been on consistently for the last 6 months.  States fasting morning blood sugars typically run around 170. Endorses adhering to diabetic diet and remaining physically active. On feet all day at work, likes to garden.    Hypertension associated with diabetes ([facility])  Treated with lisinopril-HCTZ 20-25 mg daily. Checks home readings, which she states run around 120/60s. Denies any headaches, blurry vision, dizziness/lightheadedness, and chest pressure.    Vitamin D insufficiency  Takes daily OTC vitamin D supplement--1000 IU.    Dyslipidemia associated with type 2 diabetes mellitus ([facility])  Treated with Lipitor 10 mg daily. Last lipid panel in 9/2017. Tolerating medication well, denies side effects.       Current medicines (including changes today)  Current Outpatient Prescriptions   Medication Sig Dispense Refill   • NON SPECIFIED      • metformin (GLUCOPHAGE) 1000 MG tablet TAKE ONE TABLET BY MOUTH TWICE DAILY FOR  TYPE  2  DIABETES 180 Tab 0   • glipiZIDE (GLUCOTROL) 5 MG Tab TAKE ONE TABLET BY MOUTH ONCE DAILY FOR  TYPE  2  DIABETES 90 Tab 0   • atorvastatin (LIPITOR) 10 MG Tab TAKE ONE TABLET BY MOUTH ONCE DAILY 90 Tab 0   • lisinopril-hydrochlorothiazide (PRINZIDE, ZESTORETIC) 20-25 MG per tablet TAKE ONE TABLET BY MOUTH ONCE DAILY 90 Tab 0   • Empagliflozin (JARDIANCE) 25 MG Tab Take 1 Tab by mouth every day. 90  "Tab 1   • Vitamin D, Cholecalciferol, 1000 UNITS Cap Take  by mouth.     • Cetirizine HCl (ZYRTEC ALLERGY PO) Take  by mouth.     • Fish Oil Oil by Does not apply route.     • POTASSIUM PO Take  by mouth.     • Acetaminophen (TYLENOL PO) Take  by mouth.     • MAGNESIUM GLUCONATE PO Take  by mouth.       No current facility-administered medications for this visit.      She  has a past medical history of Allergy; Arrhythmia; Arthritis; Diabetes; Diabetic neuropathy (HCC); Dyslipidemia associated with type 2 diabetes mellitus (HCC) (2/10/2016); Heart murmur; and Hypertension.    ROS  As per HPI.       Objective:     Blood pressure 106/70, pulse 76, temperature 36.5 °C (97.7 °F), height 1.549 m (5' 1\"), weight 69.4 kg (153 lb), SpO2 97 %. Body mass index is 28.91 kg/m².     Physical Exam:  Constitutional: Alert, well-appearing, no distress.  Skin: Warm, dry, good turgor, no rashes in visible areas.  Eye: Pupils are equal and round, conjunctiva clear, lids normal.  ENMT: Lips without lesions, moist mucus membranes.  Respiratory: Unlabored respiratory effort, lungs clear to auscultation, no wheezes, no rhonchi.  Cardiovascular: Normal S1, S2, no murmur, no lower extremity edema.    Monofilament testing with a 10 gram force: sensation intact: intact bilaterally  Visual Inspection: Feet without maceration, ulcers, fissures.       Assessment and Plan:   The following treatment plan was discussed    1. Uncontrolled type 2 diabetes mellitus without complication, without long-term current use of insulin (HCC)  Chronic issue, uncontrolled, most recent A1c above goal at 7.7. Since then, she has been taking the Jardiance consistently but fasting blood sugars are still high. We'll recheck A1c today. Continue current medication for now. Further recommendations pending results.  - Diabetic Monofilament LE Exam  - HEMOGLOBIN A1C; Future    2. Hypertension associated with diabetes (HCC)  Chronic issue, well controlled on " lisinopril-hydrochlorothiazide. Continue current management. Labs up-to-date.    3. Vitamin D insufficiency  Chronic issue, on vitamin D supplementation which she was advised to continue. Last vitamin D level slightly low at 28 about a year ago, so recommend that she goes up slightly on her dose to 4725-0449 international units daily.    4. Dyslipidemia associated with type 2 diabetes mellitus (HCC)  Chronic issue, well controlled on daily statin. Last lipid panel in 9/2017 shows LDL to be at goal of less than 70. Continue Lipitor.    Cholesterol,Tot 146  100 - 199 mg/dL Final   Triglycerides 146  0 - 149 mg/dL Final   HDL 51  >=40 mg/dL Final   LDL 66  <100 mg/dL Final       Followup: pending A1c results    Wendi Lind P.A.-C.

## 2018-06-06 NOTE — ASSESSMENT & PLAN NOTE
Treated with lisinopril-HCTZ 20-25 mg daily. Checks home readings, which she states run around 120/60s. Denies any headaches, blurry vision, dizziness/lightheadedness, and chest pressure.

## 2018-06-06 NOTE — ASSESSMENT & PLAN NOTE
Treated with Lipitor 10 mg daily. Last lipid panel in 9/2017. Tolerating medication well, denies side effects.

## 2018-06-06 NOTE — ASSESSMENT & PLAN NOTE
Treated with Metformin 1000 mg BID, glipizide 2.5 mg BID, and Jardiance 10 Has been on Jardiance 25 mg daily. Last A1c 7.7. Patient states at that time, had been off Jardiance due to insurance reasons. Has now been on consistently for the last 6 months.  States fasting morning blood sugars typically run around 170. Endorses adhering to diabetic diet and remaining physically active. On feet all day at work, likes to garden.

## 2018-07-28 DIAGNOSIS — E78.5 DYSLIPIDEMIA ASSOCIATED WITH TYPE 2 DIABETES MELLITUS (HCC): ICD-10-CM

## 2018-07-28 DIAGNOSIS — E11.69 DYSLIPIDEMIA ASSOCIATED WITH TYPE 2 DIABETES MELLITUS (HCC): ICD-10-CM

## 2018-07-30 RX ORDER — LISINOPRIL AND HYDROCHLOROTHIAZIDE 25; 20 MG/1; MG/1
TABLET ORAL
Qty: 90 TAB | Refills: 0 | Status: SHIPPED | OUTPATIENT
Start: 2018-07-30 | End: 2018-10-25 | Stop reason: SDUPTHER

## 2018-07-30 RX ORDER — ATORVASTATIN CALCIUM 10 MG/1
TABLET, FILM COATED ORAL
Qty: 90 TAB | Refills: 0 | Status: SHIPPED | OUTPATIENT
Start: 2018-07-30 | End: 2018-10-25 | Stop reason: SDUPTHER

## 2018-07-30 NOTE — TELEPHONE ENCOUNTER
Was the patient seen in the last year in this department? Yes    Does patient have an active prescription for medications requested? No     Received Request Via: Pharmacy      Pt met protocol?: Yes, OV last month   BP Readings from Last 1 Encounters:   06/06/18 106/70     Lab Results   Component Value Date/Time    HBA1C 7.3 (H) 06/06/2018 10:28 AM      Lab Results   Component Value Date/Time    CHOLSTRLTOT 146 09/16/2017 11:00 AM    LDL 66 09/16/2017 11:00 AM    HDL 51 09/16/2017 11:00 AM    TRIGLYCERIDE 146 09/16/2017 11:00 AM       Lab Results   Component Value Date/Time    SODIUM 138 09/16/2017 11:00 AM    POTASSIUM 3.9 09/16/2017 11:00 AM    CHLORIDE 98 09/16/2017 11:00 AM    CO2 28 09/16/2017 11:00 AM    GLUCOSE 111 (H) 09/16/2017 11:00 AM    BUN 20 09/16/2017 11:00 AM    CREATININE 0.90 09/16/2017 11:00 AM     Lab Results   Component Value Date/Time    ALKPHOSPHAT 51 09/16/2017 11:00 AM    ASTSGOT 15 09/16/2017 11:00 AM    ALTSGPT 15 09/16/2017 11:00 AM    TBILIRUBIN 1.4 09/16/2017 11:00 AM

## 2018-08-06 RX ORDER — GLIPIZIDE 5 MG/1
TABLET ORAL
Qty: 90 TAB | Refills: 1 | Status: SHIPPED | OUTPATIENT
Start: 2018-08-06 | End: 2018-10-25 | Stop reason: SDUPTHER

## 2018-08-06 NOTE — TELEPHONE ENCOUNTER
Patient has recently been seen by PCP within the last 6 months per protocol (6/18). Will refill medications for 6 months.  Lab Results   Component Value Date/Time    HBA1C 7.3 (H) 06/06/2018 10:28 AM      Lab Results   Component Value Date/Time    MALBCRT see below 08/07/2017 11:12 PM    MICROALBUR <0.7 08/07/2017 11:12 PM      Lab Results   Component Value Date/Time    ALKPHOSPHAT 51 09/16/2017 11:00 AM    ASTSGOT 15 09/16/2017 11:00 AM    ALTSGPT 15 09/16/2017 11:00 AM    TBILIRUBIN 1.4 09/16/2017 11:00 AM

## 2018-10-24 ENCOUNTER — TELEPHONE (OUTPATIENT)
Dept: MEDICAL GROUP | Facility: PHYSICIAN GROUP | Age: 58
End: 2018-10-24

## 2018-10-25 ENCOUNTER — OFFICE VISIT (OUTPATIENT)
Dept: MEDICAL GROUP | Facility: PHYSICIAN GROUP | Age: 58
End: 2018-10-25
Payer: COMMERCIAL

## 2018-10-25 VITALS
DIASTOLIC BLOOD PRESSURE: 62 MMHG | OXYGEN SATURATION: 97 % | SYSTOLIC BLOOD PRESSURE: 114 MMHG | BODY MASS INDEX: 28.7 KG/M2 | WEIGHT: 152 LBS | HEART RATE: 72 BPM | HEIGHT: 61 IN | TEMPERATURE: 98.8 F

## 2018-10-25 DIAGNOSIS — E11.59 HYPERTENSION ASSOCIATED WITH DIABETES (HCC): ICD-10-CM

## 2018-10-25 DIAGNOSIS — Z12.11 SCREENING FOR COLORECTAL CANCER: ICD-10-CM

## 2018-10-25 DIAGNOSIS — E11.69 DYSLIPIDEMIA ASSOCIATED WITH TYPE 2 DIABETES MELLITUS (HCC): ICD-10-CM

## 2018-10-25 DIAGNOSIS — Z12.12 SCREENING FOR COLORECTAL CANCER: ICD-10-CM

## 2018-10-25 DIAGNOSIS — Z12.39 ENCOUNTER FOR SCREENING FOR MALIGNANT NEOPLASM OF BREAST: ICD-10-CM

## 2018-10-25 DIAGNOSIS — I15.2 HYPERTENSION ASSOCIATED WITH DIABETES (HCC): ICD-10-CM

## 2018-10-25 DIAGNOSIS — E78.5 DYSLIPIDEMIA ASSOCIATED WITH TYPE 2 DIABETES MELLITUS (HCC): ICD-10-CM

## 2018-10-25 PROCEDURE — 99214 OFFICE O/P EST MOD 30 MIN: CPT | Performed by: PHYSICIAN ASSISTANT

## 2018-10-25 RX ORDER — GLIPIZIDE 5 MG/1
5 TABLET ORAL 2 TIMES DAILY
Qty: 180 TAB | Refills: 1 | Status: SHIPPED | OUTPATIENT
Start: 2018-10-25 | End: 2019-04-24 | Stop reason: SDUPTHER

## 2018-10-25 RX ORDER — LISINOPRIL AND HYDROCHLOROTHIAZIDE 25; 20 MG/1; MG/1
1 TABLET ORAL DAILY
Qty: 90 TAB | Refills: 1 | Status: SHIPPED | OUTPATIENT
Start: 2018-10-25 | End: 2019-04-24 | Stop reason: SDUPTHER

## 2018-10-25 RX ORDER — ATORVASTATIN CALCIUM 10 MG/1
10 TABLET, FILM COATED ORAL DAILY
Qty: 90 TAB | Refills: 1 | Status: SHIPPED | OUTPATIENT
Start: 2018-10-25 | End: 2019-03-27 | Stop reason: SDUPTHER

## 2018-10-25 ASSESSMENT — PATIENT HEALTH QUESTIONNAIRE - PHQ9: CLINICAL INTERPRETATION OF PHQ2 SCORE: 0

## 2018-10-26 NOTE — PROGRESS NOTES
Subjective:   Tammy Alejo is a 58 y.o. female here today for follow-up on diabetes, hypertension, HLD. Is an established patient of mine.    HPI:    Patient presents today for routine follow-up on diabetes.  She is currently taking metformin 1000 mg twice daily, Jardiance 25 mg daily, and glipizide 2.5 mg twice daily.  She did not get the message I sent her after her most recent A1c result came back to increase her glipizide to 5 mg twice daily.  Her blood sugars continue to run high, mostly in the mornings when she is fasting.  A typical value for her is around 170.  The rest of the day is lower, anywhere from 120s-130s when she checks after meals.  She does feel that she eats the majority of her calories later in the day after she gets off from work at 6:30.  Typically eats quite a large dinner.    She continues with her lisinopril-hydrochlorothiazide 20-25 mg daily for her hypertension.  She feels this is going well.  Blood pressure today in the office is 114/62.  Also tolerating her statin well.  Denies myalgias.  She is on atorvastatin 10 mg daily.  She is requesting refills of all of her medications today.      Current medicines (including changes today)  Current Outpatient Prescriptions   Medication Sig Dispense Refill   • glipiZIDE (GLUCOTROL) 5 MG Tab Take 1 Tab by mouth 2 times a day. 180 Tab 1   • Empagliflozin (JARDIANCE) 25 MG Tab Take 1 Tab by mouth every day. 30 Tab 5   • atorvastatin (LIPITOR) 10 MG Tab Take 1 Tab by mouth every day. 90 Tab 1   • metformin (GLUCOPHAGE) 1000 MG tablet Take 1 Tab by mouth 2 times a day, with meals. 180 Tab 1   • lisinopril-hydrochlorothiazide (PRINZIDE, ZESTORETIC) 20-25 MG per tablet Take 1 Tab by mouth every day. 90 Tab 1   • NON SPECIFIED      • Vitamin D, Cholecalciferol, 1000 UNITS Cap Take  by mouth.     • Cetirizine HCl (ZYRTEC ALLERGY PO) Take  by mouth.     • Fish Oil Oil by Does not apply route.     • POTASSIUM PO Take  by mouth.     • MAGNESIUM  "GLUCONATE PO Take  by mouth.     • Acetaminophen (TYLENOL PO) Take  by mouth.       No current facility-administered medications for this visit.      She  has a past medical history of Allergy; Arrhythmia; Arthritis; Diabetes; Diabetic neuropathy (HCC); Dyslipidemia associated with type 2 diabetes mellitus (HCC) (2/10/2016); Heart murmur; and Hypertension.    ROS  Pulmonary ROS: No shortness of breath  Cardiovascular ROS: No chest pain  Neurologic ROS: No foot burning, numbness, or tingling       Objective:     Blood pressure 114/62, pulse 72, temperature 37.1 °C (98.8 °F), height 1.549 m (5' 1\"), weight 68.9 kg (152 lb), SpO2 97 %. Body mass index is 28.72 kg/m².     Physical Exam:  Constitutional: Alert, overweight but otherwise well-appearing female in no distress.  Skin: Warm, dry, good turgor, no rashes in visible areas.  Eye: Pupils are equal and round, conjunctiva clear, lids normal.  ENMT: Lips without lesions, moist mucus membranes.  Respiratory: Unlabored respiratory effort, lungs clear to auscultation, no wheezes, no rhonchi.  Cardiovascular: Normal S1, S2, no murmur.      Assessment and Plan:   The following treatment plan was discussed    1. Uncontrolled type 2 diabetes mellitus without complication, without long-term current use of insulin ([facility])  Chronic issue, uncontrolled per her most recent A1c from June which was 7.3.  Continue to recommend that she increase the glipizide to 5 mg twice daily.  Continue metformin and Jardiance.  Is due for another A1c as well as other screening lab work which I have ordered.  Follow-up in 3 months.  - COMP METABOLIC PANEL; Future  - HEMOGLOBIN A1C; Future  - LIPID PROFILE; Future  - MICROALBUMIN CREAT RATIO URINE (LAB COLLECT); Future  - glipiZIDE (GLUCOTROL) 5 MG Tab; Take 1 Tab by mouth 2 times a day.  Dispense: 180 Tab; Refill: 1  - Empagliflozin (JARDIANCE) 25 MG Tab; Take 1 Tab by mouth every day.  Dispense: 30 Tab; Refill: 5  - metformin (GLUCOPHAGE) 1000 MG " tablet; Take 1 Tab by mouth 2 times a day, with meals.  Dispense: 180 Tab; Refill: 1    2. Dyslipidemia associated with type 2 diabetes mellitus (HCC)  Chronic issue, well controlled on current medication.  Reviewed last lipid panel from 9/2017 which was normal.  Continue atorvastatin 10 mg daily.  - LIPID PROFILE; Future  - atorvastatin (LIPITOR) 10 MG Tab; Take 1 Tab by mouth every day.  Dispense: 90 Tab; Refill: 1    Cholesterol,Tot 146  100 - 199 mg/dL Final   Triglycerides 146  0 - 149 mg/dL Final   HDL 51  >=40 mg/dL Final   LDL 66  <100 mg/dL Final       3. Hypertension associated with diabetes (HCC)  Chronic issue, currently well controlled on current medication.  Continue lisinopril-hydrochlorothiazide at the current dose.  - lisinopril-hydrochlorothiazide (PRINZIDE, ZESTORETIC) 20-25 MG per tablet; Take 1 Tab by mouth every day.  Dispense: 90 Tab; Refill: 1    4. Encounter for screening for malignant neoplasm of breast  - MA-SCREEN MAMMO W/CAD-BILAT; Future    5. Screening for colorectal cancer  - OCCULT BLOOD FECES IMMUNOASSAY (FIT); Future      Followup: Return in about 3 months (around 1/25/2019).    Wendi Lind P.A.-C.

## 2018-11-01 ENCOUNTER — HOSPITAL ENCOUNTER (OUTPATIENT)
Facility: MEDICAL CENTER | Age: 58
End: 2018-11-01
Payer: COMMERCIAL

## 2018-11-01 LAB
BDY FAT % MEASURED: 37.6 %
BP DIAS: 76 MMHG
BP SYS: 128 MMHG
DIABETES HTDIA: YES
EVENT NAME HTEVT: NORMAL
HYPERTENSION HTHYP: YES
SCREENING LOC CITY HTCIT: NORMAL
SCREENING LOC STATE HTSTA: NORMAL
SCREENING LOCATION HTLOC: NORMAL
SUBSCRIBER ID HTSID: NORMAL

## 2018-11-02 LAB
CHOLEST SERPL-MCNC: 158 MG/DL (ref 100–199)
EST. AVERAGE GLUCOSE BLD GHB EST-MCNC: 174 MG/DL
FASTING STATUS PATIENT QL REPORTED: NORMAL
GLUCOSE SERPL-MCNC: 116 MG/DL (ref 65–99)
HBA1C MFR BLD: 7.7 % (ref 0–5.6)
HDLC SERPL-MCNC: 53 MG/DL
LDLC SERPL CALC-MCNC: 73 MG/DL
TRIGL SERPL-MCNC: 161 MG/DL (ref 0–149)

## 2018-12-30 ENCOUNTER — OFFICE VISIT (OUTPATIENT)
Dept: URGENT CARE | Facility: PHYSICIAN GROUP | Age: 58
End: 2018-12-30
Payer: COMMERCIAL

## 2018-12-30 VITALS
HEART RATE: 70 BPM | HEIGHT: 61 IN | WEIGHT: 152 LBS | RESPIRATION RATE: 18 BRPM | TEMPERATURE: 98.1 F | DIASTOLIC BLOOD PRESSURE: 68 MMHG | BODY MASS INDEX: 28.7 KG/M2 | OXYGEN SATURATION: 98 % | SYSTOLIC BLOOD PRESSURE: 120 MMHG

## 2018-12-30 DIAGNOSIS — L98.9 PERINEAL IRRITATION IN FEMALE: ICD-10-CM

## 2018-12-30 DIAGNOSIS — R10.2 SUPRAPUBIC PRESSURE: ICD-10-CM

## 2018-12-30 PROCEDURE — 99214 OFFICE O/P EST MOD 30 MIN: CPT | Performed by: NURSE PRACTITIONER

## 2018-12-30 RX ORDER — FLUCONAZOLE 150 MG/1
150 TABLET ORAL DAILY
Qty: 2 TAB | Refills: 0 | Status: SHIPPED | OUTPATIENT
Start: 2018-12-30 | End: 2020-02-06

## 2019-01-01 NOTE — PROGRESS NOTES
Subjective:      Tammy Alejo is a 58 y.o. female who presents with Dysuria (x 1 week )            This is a new problem. Pt reports perineal irritation and suprapubic pressure for one week. She denies any pain with urination, urinary frequency or urgency. There is no flank pain or fever. She reports currently she has not had her period now for 2 months. She denies any change in her vaginal discharge. She states her labia can be very itchy and slight swollen at times. There is no spotting noticed. She thinks this could possibly also be her hormones as she thinks she is beginning menopause. She has not taken any new medications for this problem.         Review of Systems   Genitourinary: Negative for dysuria, frequency and urgency.        Perineal irritation   All other systems reviewed and are negative.    Past Medical History:   Diagnosis Date   • Allergy     otc Allertec   • Arrhythmia    • Arthritis     OA   • Diabetes    • Diabetic neuropathy (HCC)    • Dyslipidemia associated with type 2 diabetes mellitus (HCC) 2/10/2016   • Heart murmur    • Hypertension       Past Surgical History:   Procedure Laterality Date   • CATARACT PHACO WITH IOL Left 4/26/2016    Procedure: CATARACT PHACO WITH IOL;  Surgeon: Yogesh Lennon M.D.;  Location: [facility];  Service:    • CATARACT PHACO WITH IOL Right 4/12/2016    Procedure: CATARACT PHACO WITH IOL;  Surgeon: Yogesh Lennon M.D.;  Location: [facility];  Service:    • CALCANEUS ORIF     • CARPAL TUNNEL ENDOSCOPIC     • DENTAL EXTRACTION(S)     • PRIMARY C SECTION     • TUBAL COAGULATION LAPAROSCOPIC BILATERAL        Social History     Social History   • Marital status:      Spouse name: N/A   • Number of children: 1   • Years of education: N/A     Occupational History   • prison [employer]     Social History Main Topics   • Smoking status: Never Smoker   • Smokeless tobacco: Never Used   • Alcohol use No      Comment: Rarely  "  • Drug use: No   • Sexual activity: Yes     Partners: Male     Birth control/ protection: Surgical     Other Topics Concern   • Not on file     Social History Narrative   • No narrative on file          Objective:     /68   Pulse 70   Temp 36.7 °C (98.1 °F) (Temporal)   Resp 18   Ht 1.549 m (5' 1\")   Wt 68.9 kg (152 lb)   SpO2 98%   BMI 28.72 kg/m²      Physical Exam   Constitutional: She is oriented to person, place, and time. Vital signs are normal. She appears well-developed and well-nourished.   HENT:   Head: Normocephalic and atraumatic.   Eyes: Pupils are equal, round, and reactive to light. EOM are normal.   Neck: Normal range of motion.   Cardiovascular: Normal rate and regular rhythm.    Pulmonary/Chest: Effort normal.   Abdominal: Soft. Normal appearance and bowel sounds are normal. There is tenderness (pressure, no pain) in the suprapubic area. There is no CVA tenderness.   Genitourinary:   Genitourinary Comments: Pt deferred   Musculoskeletal: Normal range of motion.   Neurological: She is alert and oriented to person, place, and time.   Skin: Skin is warm and dry. Capillary refill takes less than 2 seconds.   Psychiatric: She has a normal mood and affect. Her speech is normal and behavior is normal. Thought content normal.   Vitals reviewed.              Assessment/Plan:     1. Suprapubic pressure    2. Perineal irritation in female  - fluconazole (DIFLUCAN) 150 MG tablet; Take 1 Tab by mouth every day.  Dispense: 2 Tab; Refill: 0    Discussed with patient her symptoms do appear to be consistent with candidiasis  Offered a vag path culture today but after discussion, pt declined  Discussed with patient will try diflucan and monitor for symptom improvement  If no improvement or worsening symptoms, advised to RTC or follow up with PCP  She can also use coconut oil to help with labial irritation and dryness  Pt understands and agrees with POC  Supportive care, differential diagnoses, and " indications for immediate follow-up discussed with patient.    Pathogenesis of diagnosis discussed including typical length and natural progression.    Instructed to return to  or nearest emergency department if symptoms fail to improve, for any change in condition, further concerns, or new concerning symptoms.  Patient states understanding of the plan of care and discharge instructions.

## 2019-01-28 ENCOUNTER — APPOINTMENT (OUTPATIENT)
Dept: MEDICAL GROUP | Facility: PHYSICIAN GROUP | Age: 59
End: 2019-01-28

## 2019-03-27 ENCOUNTER — OFFICE VISIT (OUTPATIENT)
Dept: MEDICAL GROUP | Facility: PHYSICIAN GROUP | Age: 59
End: 2019-03-27
Payer: COMMERCIAL

## 2019-03-27 VITALS
OXYGEN SATURATION: 96 % | HEIGHT: 61 IN | BODY MASS INDEX: 29.07 KG/M2 | DIASTOLIC BLOOD PRESSURE: 62 MMHG | WEIGHT: 154 LBS | SYSTOLIC BLOOD PRESSURE: 122 MMHG | TEMPERATURE: 98.5 F | HEART RATE: 80 BPM

## 2019-03-27 DIAGNOSIS — E78.5 DYSLIPIDEMIA ASSOCIATED WITH TYPE 2 DIABETES MELLITUS (HCC): ICD-10-CM

## 2019-03-27 DIAGNOSIS — E11.59 HYPERTENSION ASSOCIATED WITH DIABETES (HCC): ICD-10-CM

## 2019-03-27 DIAGNOSIS — E11.69 DYSLIPIDEMIA ASSOCIATED WITH TYPE 2 DIABETES MELLITUS (HCC): ICD-10-CM

## 2019-03-27 DIAGNOSIS — I15.2 HYPERTENSION ASSOCIATED WITH DIABETES (HCC): ICD-10-CM

## 2019-03-27 LAB
HBA1C MFR BLD: 8.8 % (ref 0–5.6)
INT CON NEG: NEGATIVE
INT CON POS: POSITIVE

## 2019-03-27 PROCEDURE — 99214 OFFICE O/P EST MOD 30 MIN: CPT | Performed by: PHYSICIAN ASSISTANT

## 2019-03-27 PROCEDURE — 83036 HEMOGLOBIN GLYCOSYLATED A1C: CPT | Performed by: PHYSICIAN ASSISTANT

## 2019-03-27 RX ORDER — ATORVASTATIN CALCIUM 20 MG/1
20 TABLET, FILM COATED ORAL DAILY
Qty: 90 TAB | Refills: 3 | Status: SHIPPED | OUTPATIENT
Start: 2019-03-27 | End: 2020-04-23

## 2019-03-27 ASSESSMENT — PATIENT HEALTH QUESTIONNAIRE - PHQ9: CLINICAL INTERPRETATION OF PHQ2 SCORE: 0

## 2019-03-27 NOTE — PATIENT INSTRUCTIONS
Dulaglutide injection  What is this medicine?  DULAGLUTIDE (DOO la GLOO tide) is used to improve blood sugar control in adults with type 2 diabetes. This medicine may be used with other oral diabetes medicines.  This medicine may be used for other purposes; ask your health care provider or pharmacist if you have questions.  COMMON BRAND NAME(S): TRULICITY  What should I tell my health care provider before I take this medicine?  They need to know if you have any of these conditions:  -endocrine tumors (MEN 2) or if someone in your family had these tumors  -history of pancreatitis  -kidney disease  -liver disease  -stomach problems  -thyroid cancer or if someone in your family had thyroid cancer  -an unusual or allergic reaction to dulaglutide, other medicines, foods, dyes, or preservatives  -pregnant or trying to get pregnant  -breast-feeding  How should I use this medicine?  This medicine is for injection under the skin of your upper leg (thigh), stomach area, or upper arm. It is usually given once every week (every 7 days). You will be taught how to prepare and give this medicine. Use exactly as directed. Take your medicine at regular intervals. Do not take it more often than directed.  If you use this medicine with insulin, you should inject this medicine and the insulin separately. Do not mix them together. Do not give the injections right next to each other. Change (rotate) injection sites with each injection.  It is important that you put your used needles and syringes in a special sharps container. Do not put them in a trash can. If you do not have a sharps container, call your pharmacist or healthcare provider to get one.  A special MedGuide will be given to you by the pharmacist with each prescription and refill. Be sure to read this information carefully each time.  Talk to your pediatrician regarding the use of this medicine in children. Special care may be needed.  Overdosage: If you think you have taken  too much of this medicine contact a poison control center or emergency room at once.  NOTE: This medicine is only for you. Do not share this medicine with others.  What if I miss a dose?  If you miss a dose, take it as soon as you can within 3 days after the missed dose. Then take your next dose at your regular weekly time. If it has been longer than 3 days after the missed dose, do not take the missed dose. Take the next dose at your regular time. Do not take double or extra doses. If you have questions about a missed dose, contact your health care provider for advice.  What may interact with this medicine?  Do not take this medicine with any of the following medications:  -gatifloxacin  Many medications may cause changes in blood sugar, these include:  -alcohol containing beverages  -aspirin and aspirin-like drugs  -chloramphenicol  -chromium  -diuretics  -female hormones, such as estrogens or progestins, birth control pills  -heart medicines  -isoniazid  -male hormones or anabolic steroids  -medications for weight loss  -medicines for allergies, asthma, cold, or cough  -medicines for mental problems  -medicines called MAO inhibitors - Nardil, Parnate, Marplan, Eldepryl  -niacin  -NSAIDS, such as ibuprofen  -pentamidine  -phenytoin  -probenecid  -quinolone antibiotics such as ciprofloxacin, levofloxacin, ofloxacin  -some herbal dietary supplements  -steroid medicines such as prednisone or cortisone  -thyroid hormones  Some medications can hide the warning symptoms of low blood sugar (hypoglycemia). You may need to monitor your blood sugar more closely if you are taking one of these medications. These include:  -beta-blockers, often used for high blood pressure or heart problems (examples include atenolol, metoprolol, propranolol)  -clonidine  -guanethidine  -reserpine  This list may not describe all possible interactions. Give your health care provider a list of all the medicines, herbs, non-prescription drugs, or  dietary supplements you use. Also tell them if you smoke, drink alcohol, or use illegal drugs. Some items may interact with your medicine.  What should I watch for while using this medicine?  Visit your doctor or health care professional for regular checks on your progress.  A test called the HbA1C (A1C) will be monitored. This is a simple blood test. It measures your blood sugar control over the last 2 to 3 months. You will receive this test every 3 to 6 months.  Learn how to check your blood sugar. Learn the symptoms of low and high blood sugar and how to manage them.  Always carry a quick-source of sugar with you in case you have symptoms of low blood sugar. Examples include hard sugar candy or glucose tablets. Make sure others know that you can choke if you eat or drink when you develop serious symptoms of low blood sugar, such as seizures or unconsciousness. They must get medical help at once.  Tell your doctor or health care professional if you have high blood sugar. You might need to change the dose of your medicine. If you are sick or exercising more than usual, you might need to change the dose of your medicine.  Do not skip meals. Ask your doctor or health care professional if you should avoid alcohol. Many nonprescription cough and cold products contain sugar or alcohol. These can affect blood sugar.  Wear a medical ID bracelet or chain, and carry a card that describes your disease and details of your medicine and dosage times.  What side effects may I notice from receiving this medicine?  Side effects that you should report to your doctor or health care professional as soon as possible:  -allergic reactions like skin rash, itching or hives, swelling of the face, lips, or tongue  -breathing problems  -signs and symptoms of low blood sugar such as feeling anxious, confusion, dizziness, increased hunger, unusually weak or tired, sweating, shakiness, cold, irritable, headache, blurred vision, fast heartbeat,  loss of consciousness  -unusual stomach upset or pain  -vomiting  Side effects that usually do not require medical attention (report to your doctor or health care professional if they continue or are bothersome):  -diarrhea  -heartburn  -loss of appetite  -nausea  -pain, redness, or irritation at site where injected  This list may not describe all possible side effects. Call your doctor for medical advice about side effects. You may report side effects to FDA at 1-098-XBL-5058.  Where should I keep my medicine?  Keep out of the reach of children.  Store this medicine in a refrigerator between 2 and 8 degrees C (36 and 46 degrees F). Do not freeze or use if the medicine has been frozen. Protect from light and excessive heat. Each single-dose pen or prefilled syringe can be kept at room temperature, not to exceed 30 degrees C (86 degrees F) for a total of 14 days, if needed. Store in the carton until use. Throw away any unused medicine after the expiration date.  NOTE: This sheet is a summary. It may not cover all possible information. If you have questions about this medicine, talk to your doctor, pharmacist, or health care provider.  © 2018 [employer]vier/Gold Standard (2017-02-06 14:15:04)

## 2019-03-27 NOTE — PROGRESS NOTES
Subjective:   Tammy Alejo is a 58 y.o. female here today for follow up on diabetes and other chronic conditions. Is an established patient of mine.    HPI:    Patient presents to the office today for routine follow-up regarding her diabetes. I last saw her in October 2018.    She is currently taking metformin 1000 mg BID and glipizide 5 mg BID. Was previously on Jardiance but stopped taking it in January due to constipation, skin rashes, palpitations, and dizziness.  She states that the symptoms she was experiencing went away immediately after she stopped that medication.  Last A1c in 11/2018 was elevated at 7.7. Recent fasting blood sugars have been running higher lately--140-170. Evening values are consistently > 200. She admits that she's gotten off-track in terms of her diet. Feels she gets a lot of physical activity from her job.     She continues on lisinopril-HCTZ 20-25 mg daily for her hypertension. BP today in the office is 122/62 and has run similarly at previous office visits. She also continues on atorvastatin 10 mg daily. Most recent lipid panel was in 11/2018.    Current medicines (including changes today)  Current Outpatient Prescriptions   Medication Sig Dispense Refill   • atorvastatin (LIPITOR) 20 MG Tab Take 1 Tab by mouth every day. 90 Tab 3   • Dulaglutide (TRULICITY) 0.75 MG/0.5ML Solution Pen-injector Inject 0.75 mg as instructed every 7 days. 4 PEN 5   • glipiZIDE (GLUCOTROL) 5 MG Tab Take 1 Tab by mouth 2 times a day. 180 Tab 1   • metformin (GLUCOPHAGE) 1000 MG tablet Take 1 Tab by mouth 2 times a day, with meals. 180 Tab 1   • lisinopril-hydrochlorothiazide (PRINZIDE, ZESTORETIC) 20-25 MG per tablet Take 1 Tab by mouth every day. 90 Tab 1   • NON SPECIFIED      • Vitamin D, Cholecalciferol, 1000 UNITS Cap Take  by mouth.     • Cetirizine HCl (ZYRTEC ALLERGY PO) Take  by mouth.     • Fish Oil Oil by Does not apply route.     • POTASSIUM PO Take  by mouth.     • Acetaminophen  "(TYLENOL PO) Take  by mouth.     • MAGNESIUM GLUCONATE PO Take  by mouth.     • fluconazole (DIFLUCAN) 150 MG tablet Take 1 Tab by mouth every day. (Patient not taking: Reported on 3/27/2019) 2 Tab 0     No current facility-administered medications for this visit.      She  has a past medical history of Allergy; Arrhythmia; Arthritis; Diabetes; Diabetic neuropathy (HCC); Dyslipidemia associated with type 2 diabetes mellitus (HCC) (2/10/2016); Heart murmur; and Hypertension.    ROS  No chest pain, no shortness of breath  No abdominal pain, nausea, or stool changes  No lower extremity edema  No foot pain/numbness/tingling       Objective:     Blood pressure 122/62, pulse 80, temperature 36.9 °C (98.5 °F), height 1.549 m (5' 1\"), weight 69.9 kg (154 lb), SpO2 96 %. Body mass index is 29.1 kg/m².     Physical Exam:  Constitutional: Alert, overweight but otherwise well-appearing, very pleasant, no distress.  Skin: Warm, dry, good turgor, no rashes in visible areas.  Eye: Pupils are equal and round, conjunctiva clear, lids normal.  ENMT: Lips without lesions, moist mucus membranes.  Respiratory: Unlabored respiratory effort, lungs clear to auscultation, no wheezes, no rhonchi.  Cardiovascular: Normal S1, S2, no murmur.      Assessment and Plan:   The following treatment plan was discussed    1. Uncontrolled type 2 diabetes mellitus without complication, without long-term current use of insulin (HCC)  Chronic issue, uncontrolled.  A1c was done during her visit which came back elevated at 8.8.  At this point, I recommend initiation of another diabetic medication.  Would prefer starting Trulicity rather than increasing her glipizide again.  She does have concerns about the cost.  I have sent prescription to her pharmacy with instructions to let me know if it is too expensive.  If that is the case, we will plan on increasing the glipizide to 10 mg twice daily.  She is also going to work on her diet as she is well aware that " there is a lot of room for improvement.  Follow-up in 3 months.  - POCT  A1C  - Dulaglutide (TRULICITY) 0.75 MG/0.5ML Solution Pen-injector; Inject 0.75 mg as instructed every 7 days.  Dispense: 4 PEN; Refill: 5    2. Hypertension associated with diabetes (HCC)  Chronic issue, well controlled with lisinopril-hydrochlorothiazide 20-25 mg daily.  Blood pressure is at goal.  Continue current management.    3. Dyslipidemia associated with type 2 diabetes mellitus (HCC)  Chronic issue, reasonably well controlled on Lipitor 10 mg, but LDL is still slightly above goal of less than 70.  Recommend increasing dose to 20 mg daily.  New prescription sent to her pharmacy.  - atorvastatin (LIPITOR) 20 MG Tab; Take 1 Tab by mouth every day.  Dispense: 90 Tab; Refill: 3      Followup: Return in about 3 months (around 6/27/2019) for f/u DM; Short.    Wendi Lind P.A.-C.

## 2019-03-28 ENCOUNTER — TELEPHONE (OUTPATIENT)
Dept: MEDICAL GROUP | Facility: PHYSICIAN GROUP | Age: 59
End: 2019-03-28

## 2019-03-28 NOTE — TELEPHONE ENCOUNTER
MEDICATION PRIOR AUTHORIZATION NEEDED:    1. Name of Medication: TRULICITY    2. Requested By (Name of Pharmacy): [facility]     3. Is insurance on file current? YES    4. What is the name & phone number of the 3rd party payor? [email or web address]

## 2019-04-24 DIAGNOSIS — E11.59 HYPERTENSION ASSOCIATED WITH DIABETES (HCC): ICD-10-CM

## 2019-04-24 DIAGNOSIS — I15.2 HYPERTENSION ASSOCIATED WITH DIABETES (HCC): ICD-10-CM

## 2019-04-25 RX ORDER — GLIPIZIDE 5 MG/1
TABLET ORAL
Qty: 180 TAB | Refills: 0 | Status: SHIPPED | OUTPATIENT
Start: 2019-04-25 | End: 2019-07-25 | Stop reason: SDUPTHER

## 2019-04-25 RX ORDER — LISINOPRIL AND HYDROCHLOROTHIAZIDE 25; 20 MG/1; MG/1
TABLET ORAL
Qty: 90 TAB | Refills: 0 | Status: SHIPPED | OUTPATIENT
Start: 2019-04-25 | End: 2019-07-25 | Stop reason: SDUPTHER

## 2019-04-25 NOTE — TELEPHONE ENCOUNTER
Was the patient seen in the last year in this department? Yes    Does patient have an active prescription for medications requested? No     Received Request Via: Pharmacy      Pt met protocol?: Yes, OV last month   BP Readings from Last 1 Encounters:   03/27/19 122/62     Lab Results   Component Value Date/Time    HBA1C 8.8 (A) 03/27/2019 10:56 AM

## 2019-06-21 ENCOUNTER — HOSPITAL ENCOUNTER (OUTPATIENT)
Dept: LAB | Facility: MEDICAL CENTER | Age: 59
End: 2019-06-21
Attending: PHYSICIAN ASSISTANT
Payer: COMMERCIAL

## 2019-06-21 ENCOUNTER — HOSPITAL ENCOUNTER (OUTPATIENT)
Facility: MEDICAL CENTER | Age: 59
End: 2019-06-21
Attending: PHYSICIAN ASSISTANT
Payer: COMMERCIAL

## 2019-06-21 LAB
ALBUMIN SERPL BCP-MCNC: 4.5 G/DL (ref 3.2–4.9)
ALBUMIN/GLOB SERPL: 1.7 G/DL
ALP SERPL-CCNC: 60 U/L (ref 30–99)
ALT SERPL-CCNC: 24 U/L (ref 2–50)
ANION GAP SERPL CALC-SCNC: 12 MMOL/L (ref 0–11.9)
AST SERPL-CCNC: 17 U/L (ref 12–45)
BILIRUB SERPL-MCNC: 0.6 MG/DL (ref 0.1–1.5)
BUN SERPL-MCNC: 20 MG/DL (ref 8–22)
CALCIUM SERPL-MCNC: 9.3 MG/DL (ref 8.5–10.5)
CHLORIDE SERPL-SCNC: 99 MMOL/L (ref 96–112)
CO2 SERPL-SCNC: 28 MMOL/L (ref 20–33)
CREAT SERPL-MCNC: 0.77 MG/DL (ref 0.5–1.4)
CREAT UR-MCNC: 145.3 MG/DL
FASTING STATUS PATIENT QL REPORTED: NORMAL
GLOBULIN SER CALC-MCNC: 2.6 G/DL (ref 1.9–3.5)
GLUCOSE SERPL-MCNC: 188 MG/DL (ref 65–99)
MICROALBUMIN UR-MCNC: 0.9 MG/DL
MICROALBUMIN/CREAT UR: 6 MG/G (ref 0–30)
POTASSIUM SERPL-SCNC: 3.7 MMOL/L (ref 3.6–5.5)
PROT SERPL-MCNC: 7.1 G/DL (ref 6–8.2)
SODIUM SERPL-SCNC: 139 MMOL/L (ref 135–145)

## 2019-06-21 PROCEDURE — 82274 ASSAY TEST FOR BLOOD FECAL: CPT

## 2019-06-21 PROCEDURE — 80053 COMPREHEN METABOLIC PANEL: CPT

## 2019-06-21 PROCEDURE — 82043 UR ALBUMIN QUANTITATIVE: CPT

## 2019-06-21 PROCEDURE — 82570 ASSAY OF URINE CREATININE: CPT

## 2019-06-21 PROCEDURE — 36415 COLL VENOUS BLD VENIPUNCTURE: CPT

## 2019-06-22 DIAGNOSIS — Z12.11 SCREENING FOR COLORECTAL CANCER: ICD-10-CM

## 2019-06-22 DIAGNOSIS — Z12.12 SCREENING FOR COLORECTAL CANCER: ICD-10-CM

## 2019-06-22 LAB — HEMOCCULT STL QL IA: NEGATIVE

## 2019-06-27 ENCOUNTER — OFFICE VISIT (OUTPATIENT)
Dept: MEDICAL GROUP | Facility: PHYSICIAN GROUP | Age: 59
End: 2019-06-27
Payer: COMMERCIAL

## 2019-06-27 VITALS
SYSTOLIC BLOOD PRESSURE: 112 MMHG | OXYGEN SATURATION: 97 % | BODY MASS INDEX: 29.64 KG/M2 | TEMPERATURE: 98.2 F | HEIGHT: 61 IN | HEART RATE: 86 BPM | WEIGHT: 157 LBS | DIASTOLIC BLOOD PRESSURE: 74 MMHG

## 2019-06-27 DIAGNOSIS — E11.59 HYPERTENSION ASSOCIATED WITH DIABETES (HCC): ICD-10-CM

## 2019-06-27 DIAGNOSIS — L81.9 DISCOLORATION OF SKIN: ICD-10-CM

## 2019-06-27 DIAGNOSIS — I15.2 HYPERTENSION ASSOCIATED WITH DIABETES (HCC): ICD-10-CM

## 2019-06-27 LAB
HBA1C MFR BLD: 7.9 % (ref 0–5.6)
INT CON NEG: ABNORMAL
INT CON POS: ABNORMAL

## 2019-06-27 PROCEDURE — 99214 OFFICE O/P EST MOD 30 MIN: CPT | Performed by: PHYSICIAN ASSISTANT

## 2019-06-27 PROCEDURE — 83036 HEMOGLOBIN GLYCOSYLATED A1C: CPT | Performed by: PHYSICIAN ASSISTANT

## 2019-06-28 NOTE — PROGRESS NOTES
Subjective:   Tammy Alejo is a 59 y.o. female here today for follow-up on diabetes and other chronic conditions. Is an established patient of mine.    HPI:    Tammy presents to the office today for follow-up regarding her type 2 diabetes.  I last saw her in March 2019.  She continues on metformin 1000 mg twice daily, glipizide 5 mg twice daily, and most recently, Trulicity 0.75 mg weekly. Blood sugars have been running lower but still high--states 140-170 fasting. Has been noticing heartburn which she attributes to side effect from the Trulicity. Takes Pepcid AC which is helpful. Has been trying to watch her diet. Tries to limit carbohydrates and sugars. Uses a lot of sugar-free products.    Continues on lisinopril-HCTZ 20-25 mg daily for hypertension. BP today in the office is 112/74.    Has noticed discoloration on the left side of her neck over the last month or so. Does not itch or burn at all but wanted to mention it. Wonders if it could be side effect from medication.      Current medicines (including changes today)  Current Outpatient Prescriptions   Medication Sig Dispense Refill   • Dulaglutide (TRULICITY) 1.5 MG/0.5ML Solution Pen-injector Inject 1.5 mg as instructed every 7 days. 4 PEN 11   • lisinopril-hydrochlorothiazide (PRINZIDE, ZESTORETIC) 20-25 MG per tablet TAKE 1 TABLET BY MOUTH ONCE DAILY 90 Tab 0   • metformin (GLUCOPHAGE) 1000 MG tablet TAKE 1 TABLET BY MOUTH TWICE DAILY WITH MEALS 180 Tab 0   • glipiZIDE (GLUCOTROL) 5 MG Tab TAKE 1 TABLET BY MOUTH TWICE DAILY 180 Tab 0   • atorvastatin (LIPITOR) 20 MG Tab Take 1 Tab by mouth every day. 90 Tab 3   • fluconazole (DIFLUCAN) 150 MG tablet Take 1 Tab by mouth every day. (Patient not taking: Reported on 3/27/2019) 2 Tab 0   • NON SPECIFIED      • Vitamin D, Cholecalciferol, 1000 UNITS Cap Take  by mouth.     • Cetirizine HCl (ZYRTEC ALLERGY PO) Take  by mouth.     • Fish Oil Oil by Does not apply route.     • POTASSIUM PO Take  by  "mouth.     • Acetaminophen (TYLENOL PO) Take  by mouth.     • MAGNESIUM GLUCONATE PO Take  by mouth.       No current facility-administered medications for this visit.      She  has a past medical history of Allergy; Arrhythmia; Arthritis; Diabetes; Diabetic neuropathy (HCC); Dyslipidemia associated with type 2 diabetes mellitus (HCC) (2/10/2016); Heart murmur; and Hypertension.    ROS  +heartburn  No diarrhea  No numbness or pain of feet       Objective:     /74 (BP Location: Left arm, Patient Position: Sitting, BP Cuff Size: Adult)   Pulse 86   Temp 36.8 °C (98.2 °F)   Ht 1.549 m (5' 1\")   Wt 71.2 kg (157 lb)   SpO2 97%  Body mass index is 29.66 kg/m².     Physical Exam:  Constitutional: Alert, well-appearing, no distress.  Skin: Warm, dry, good turgor. Very mild red discoloration noted to the left side of the neck.  Eye: Pupils are equal and round, conjunctiva clear, lids normal.  ENMT: Lips without lesions, moist mucus membranes.  Respiratory: Unlabored respiratory effort, lungs clear to auscultation, no wheezes, no rhonchi.  Cardiovascular: Normal S1, S2, no murmur.      Assessment and Plan:   The following treatment plan was discussed    1. Uncontrolled type 2 diabetes mellitus without complication, without long-term current use of insulin (HCC)  Chronic issue, uncontrolled but improving. A1c today is 7.9, down from 8.8 in March. Recommend continuing Trulicity. Will increase dose to 1.5 mg weekly with continuation of metformin and glipizide. Counseled on diet including carbohydrate/sugar limitation, always pairing carb with protein. Follow up in 3 months.  - POCT  A1C  - Dulaglutide (TRULICITY) 1.5 MG/0.5ML Solution Pen-injector; Inject 1.5 mg as instructed every 7 days.  Dispense: 4 PEN; Refill: 11    2. Hypertension associated with diabetes (HCC)  Chronic issue, well-controlled with lisinopril-HCTZ. BP remains at goal. Continue current management.    3. Discoloration of skin  New problem, " uncontrolled. Has very slight erythema of left side of neck on exam. Is not consistent with allergic reaction. Suspect mild seborrheic dermatitis vs. Sun damage. Recommend trial of OTC hydrocortisone cream.      Followup: Return in about 3 months (around 9/27/2019) for DCV.    Wendi Lind P.A.-C.

## 2019-07-25 DIAGNOSIS — I15.2 HYPERTENSION ASSOCIATED WITH DIABETES (HCC): ICD-10-CM

## 2019-07-25 DIAGNOSIS — E11.59 HYPERTENSION ASSOCIATED WITH DIABETES (HCC): ICD-10-CM

## 2019-07-26 RX ORDER — LISINOPRIL AND HYDROCHLOROTHIAZIDE 25; 20 MG/1; MG/1
TABLET ORAL
Qty: 90 TAB | Refills: 0 | Status: SHIPPED | OUTPATIENT
Start: 2019-07-26 | End: 2019-10-23 | Stop reason: SDUPTHER

## 2019-07-26 RX ORDER — GLIPIZIDE 5 MG/1
TABLET ORAL
Qty: 180 TAB | Refills: 0 | Status: SHIPPED | OUTPATIENT
Start: 2019-07-26 | End: 2019-10-23 | Stop reason: SDUPTHER

## 2019-07-26 NOTE — TELEPHONE ENCOUNTER
*PT NEEDS TO GET DIABETES LABS UPDATED*  Was the patient seen in the last year in this department? Yes    Does patient have an active prescription for medications requested? No     Received Request Via: Pharmacy      Pt met protocol?: NO    LAST OV 06/27/2019    BP Readings from Last 1 Encounters:   06/27/19 112/74     Lab Results   Component Value Date/Time    CHOLSTRLTOT 158 11/01/2018 08:45 AM    LDL 73 11/01/2018 08:45 AM    HDL 53 11/01/2018 08:45 AM    TRIGLYCERIDE 161 (H) 11/01/2018 08:45 AM       Lab Results   Component Value Date/Time    SODIUM 139 06/21/2019 09:39 AM    POTASSIUM 3.7 06/21/2019 09:39 AM    CHLORIDE 99 06/21/2019 09:39 AM    CO2 28 06/21/2019 09:39 AM    GLUCOSE 188 (H) 06/21/2019 09:39 AM    BUN 20 06/21/2019 09:39 AM    CREATININE 0.77 06/21/2019 09:39 AM     Lab Results   Component Value Date/Time    ALKPHOSPHAT 60 06/21/2019 09:39 AM    ASTSGOT 17 06/21/2019 09:39 AM    ALTSGPT 24 06/21/2019 09:39 AM    TBILIRUBIN 0.6 06/21/2019 09:39 AM        Lab Results  Component Value Date/Time   HBA1C 7.9 (A) 06/27/2019 1749       Lab Results  Component Value Date/Time   AVGLUC 174 11/01/2018 0845       Lab Results  Component Value Date/Time   CHOLSTRLTOT 158 11/01/2018 0845       Lab Results  Component Value Date/Time   TRIGLYCERIDE 161 (H) 11/01/2018 0845     No components found for: HLD    Lab Results  Component Value Date/Time   LDL 73 11/01/2018 0845

## 2019-09-20 ENCOUNTER — OFFICE VISIT (OUTPATIENT)
Dept: MEDICAL GROUP | Facility: PHYSICIAN GROUP | Age: 59
End: 2019-09-20
Payer: COMMERCIAL

## 2019-09-20 VITALS
BODY MASS INDEX: 29.64 KG/M2 | HEIGHT: 61 IN | HEART RATE: 78 BPM | SYSTOLIC BLOOD PRESSURE: 108 MMHG | WEIGHT: 157 LBS | DIASTOLIC BLOOD PRESSURE: 62 MMHG | OXYGEN SATURATION: 97 % | TEMPERATURE: 98.2 F

## 2019-09-20 DIAGNOSIS — Z11.59 NEED FOR HEPATITIS C SCREENING TEST: ICD-10-CM

## 2019-09-20 DIAGNOSIS — E11.59 HYPERTENSION ASSOCIATED WITH DIABETES (HCC): ICD-10-CM

## 2019-09-20 DIAGNOSIS — Z23 NEED FOR VACCINATION: ICD-10-CM

## 2019-09-20 DIAGNOSIS — I15.2 HYPERTENSION ASSOCIATED WITH DIABETES (HCC): ICD-10-CM

## 2019-09-20 PROCEDURE — 99214 OFFICE O/P EST MOD 30 MIN: CPT | Performed by: PHYSICIAN ASSISTANT

## 2019-09-20 NOTE — PROGRESS NOTES
Subjective:   Tammy Alejo is a 59 y.o. female here today for follow-up on type 2 diabetes. Is an established patient of mine.    HPI:    Patient presents to the office today for routine follow-up on her type 2 diabetes.  She was last seen in June 2019.  Diabetes is currently treated with metformin 1000 mg twice daily, glipizide 5 mg twice daily, and Trulicity, which was increased to 1.5 mg weekly at her last appointment.  She has continued to have some GI distress from the Trulicity but states it is tolerable.  Last A1c on 6/27/19 was 7.9.  She states that her home blood sugar readings have been improving.  They run anywhere from 130s but can still get up to the 170s. She also continues on lisinopril-HCTZ 20-25 mg daily for hypertension. BP today in the office is 108/62.  No new concerns today.      Current medicines (including changes today)  Current Outpatient Medications   Medication Sig Dispense Refill   • NON SPECIFIED 0.5 mL by Intramuscular route Once for 1 dose. 1 Each 1   • glipiZIDE (GLUCOTROL) 5 MG Tab TAKE 1 TABLET BY MOUTH TWICE DAILY 180 Tab 0   • lisinopril-hydrochlorothiazide (PRINZIDE, ZESTORETIC) 20-25 MG per tablet TAKE 1 TABLET BY MOUTH ONCE DAILY 90 Tab 0   • metformin (GLUCOPHAGE) 1000 MG tablet TAKE 1 TABLET BY MOUTH TWICE DAILY WITH MEALS 180 Tab 0   • Dulaglutide (TRULICITY) 1.5 MG/0.5ML Solution Pen-injector Inject 1.5 mg as instructed every 7 days. 4 PEN 11   • atorvastatin (LIPITOR) 20 MG Tab Take 1 Tab by mouth every day. 90 Tab 3   • NON SPECIFIED      • Vitamin D, Cholecalciferol, 1000 UNITS Cap Take  by mouth.     • Cetirizine HCl (ZYRTEC ALLERGY PO) Take  by mouth.     • Fish Oil Oil by Does not apply route.     • POTASSIUM PO Take  by mouth.     • Acetaminophen (TYLENOL PO) Take  by mouth.     • MAGNESIUM GLUCONATE PO Take  by mouth.     • fluconazole (DIFLUCAN) 150 MG tablet Take 1 Tab by mouth every day. (Patient not taking: Reported on 3/27/2019) 2 Tab 0     No  "current facility-administered medications for this visit.      She  has a past medical history of Allergy, Arrhythmia, Arthritis, Diabetes, Diabetic neuropathy (HCC), Dyslipidemia associated with type 2 diabetes mellitus (HCC) (2/10/2016), Heart murmur, and Hypertension.    ROS  Pulmonary ROS: No shortness of breath  Cardiovascular ROS: No chest pain     Objective:     /62 (BP Location: Left arm, Patient Position: Sitting, BP Cuff Size: Adult)   Pulse 78   Temp 36.8 °C (98.2 °F)   Ht 1.549 m (5' 1\")   Wt 71.2 kg (157 lb)   SpO2 97%  Body mass index is 29.66 kg/m².     Physical Exam:  Constitutional: Alert, well-appearing, very pleasant, no distress.  Skin: Warm, dry, good turgor, no rashes in visible areas.  Eye: Pupils are equal and round, conjunctiva clear, lids normal.  ENMT: Lips without lesions, moist mucus membranes.  Respiratory: Unlabored respiratory effort, lungs clear to auscultation, no wheezes, no rhonchi.  Cardiovascular: Normal S1, S2, no murmur, no lower extremity edema.    Monofilament testing with a 10 gram force: sensation intact: intact bilaterally  Visual Inspection: Feet without maceration, ulcers, fissures.  Pedal pulses: intact bilaterally      Assessment and Plan:   The following treatment plan was discussed    1. Uncontrolled type 2 diabetes mellitus without complication, without long-term current use of insulin (HCC)  Established problem, still uncontrolled per home blood sugar readings but improving since starting on higher dose of Trulicity.  Will be due for A1c later this month.  If still above 7, plan will be to start Invokana.  Patient previously on Jardiance and started having some side effects, but explained that she may not have the same side effects on Invokana.  Records requested for last retinal eye exam.  - HEMOGLOBIN A1C; Future  - Diabetic Monofilament LE Exam    2. Hypertension associated with diabetes (HCC)  Established problem, well-controlled with " lisinopril-hydrochlorothiazide.  Continue current management.    3. Need for hepatitis C screening test  - HEP C VIRUS ANTIBODY; Future    4. Need for vaccination  We will have shingles vaccine done at the pharmacy.  - NON SPECIFIED; 0.5 mL by Intramuscular route Once for 1 dose.  Dispense: 1 Each; Refill: 1      Followup: Return in about 3 months (around 12/20/2019).    Wendi Lind P.A.-C.

## 2019-09-20 NOTE — LETTER
[facility]  Wendi Lind P.A.-C.  [address]  [place] [address]  Fax: [phone]   Authorization for Release/Disclosure of   Protected Health Information   Name: KORI DIAZ : 1960 SSN: [id number]   Address: [address] [place] [id number] Phone:    [phone] (home)    I authorize the entity listed below to release/disclose the PHI below to:   [facility]/Wendi Lind P.A.-C. and Wendi Lind P.A.-C.   Provider or Entity Name:  [facility]   Address   City, State, Zip   Phone:      Fax:     Reason for request: continuity of care   Information to be released:    [  ] LAST COLONOSCOPY,  including any PATH REPORT and follow-up  [  ] LAST FIT/COLOGUARD RESULT [  ] LAST DEXA  [  ] LAST MAMMOGRAM  [  ] LAST PAP  [  ] LAST LABS [XX  ] RETINA EXAM REPORT  [  ] IMMUNIZATION RECORDS  [  ] Release all info      [  ] Check here and initial the line next to each item to release ALL health information INCLUDING  _____ Care and treatment for drug and / or alcohol abuse  _____ HIV testing, infection status, or AIDS  _____ Genetic Testing    DATES OF SERVICE OR TIME PERIOD TO BE DISCLOSED: _____________  I understand and acknowledge that:  * This Authorization may be revoked at any time by you in writing, except if your health information has already been used or disclosed.  * Your health information that will be used or disclosed as a result of you signing this authorization could be re-disclosed by the recipient. If this occurs, your re-disclosed health information may no longer be protected by State or Federal laws.  * You may refuse to sign this Authorization. Your refusal will not affect your ability to obtain treatment.  * This Authorization becomes effective upon signing and will  on (date) __________.      If no date is indicated, this Authorization will  one (1) year from the signature date.    Name: Kori Diaz    Signature:   Date:     9/20/2019       PLEASE FAX REQUESTED RECORDS BACK TO: [phone]

## 2019-10-05 ENCOUNTER — OFFICE VISIT (OUTPATIENT)
Dept: URGENT CARE | Facility: PHYSICIAN GROUP | Age: 59
End: 2019-10-05
Payer: COMMERCIAL

## 2019-10-05 ENCOUNTER — HOSPITAL ENCOUNTER (OUTPATIENT)
Facility: MEDICAL CENTER | Age: 59
End: 2019-10-05
Attending: PHYSICIAN ASSISTANT
Payer: COMMERCIAL

## 2019-10-05 VITALS
HEART RATE: 74 BPM | HEIGHT: 61 IN | OXYGEN SATURATION: 100 % | WEIGHT: 157 LBS | DIASTOLIC BLOOD PRESSURE: 68 MMHG | RESPIRATION RATE: 16 BRPM | TEMPERATURE: 98.8 F | BODY MASS INDEX: 29.64 KG/M2 | SYSTOLIC BLOOD PRESSURE: 130 MMHG

## 2019-10-05 DIAGNOSIS — Z20.2 EXPOSURE TO STD: ICD-10-CM

## 2019-10-05 LAB
APPEARANCE UR: NORMAL
BILIRUB UR STRIP-MCNC: NEGATIVE MG/DL
COLOR UR AUTO: YELLOW
GLUCOSE UR STRIP.AUTO-MCNC: NEGATIVE MG/DL
KETONES UR STRIP.AUTO-MCNC: NEGATIVE MG/DL
LEUKOCYTE ESTERASE UR QL STRIP.AUTO: NORMAL
NITRITE UR QL STRIP.AUTO: NEGATIVE
PH UR STRIP.AUTO: 5 [PH] (ref 5–8)
PROT UR QL STRIP: NEGATIVE MG/DL
RBC UR QL AUTO: NORMAL
SP GR UR STRIP.AUTO: 1.02
UROBILINOGEN UR STRIP-MCNC: 0.2 MG/DL

## 2019-10-05 PROCEDURE — 99214 OFFICE O/P EST MOD 30 MIN: CPT | Performed by: PHYSICIAN ASSISTANT

## 2019-10-05 PROCEDURE — 87510 GARDNER VAG DNA DIR PROBE: CPT

## 2019-10-05 PROCEDURE — 87491 CHLMYD TRACH DNA AMP PROBE: CPT

## 2019-10-05 PROCEDURE — 87591 N.GONORRHOEAE DNA AMP PROB: CPT

## 2019-10-05 PROCEDURE — 87480 CANDIDA DNA DIR PROBE: CPT

## 2019-10-05 PROCEDURE — 81002 URINALYSIS NONAUTO W/O SCOPE: CPT | Performed by: PHYSICIAN ASSISTANT

## 2019-10-05 PROCEDURE — 87660 TRICHOMONAS VAGIN DIR PROBE: CPT

## 2019-10-05 RX ORDER — AZITHROMYCIN 500 MG/1
1000 TABLET, FILM COATED ORAL ONCE
Qty: 2 TAB | Refills: 0 | Status: SHIPPED | OUTPATIENT
Start: 2019-10-05 | End: 2019-10-05

## 2019-10-06 DIAGNOSIS — Z20.2 EXPOSURE TO STD: ICD-10-CM

## 2019-10-06 LAB
C TRACH DNA SPEC QL NAA+PROBE: POSITIVE
CANDIDA DNA VAG QL PROBE+SIG AMP: NEGATIVE
G VAGINALIS DNA VAG QL PROBE+SIG AMP: NEGATIVE
N GONORRHOEA DNA SPEC QL NAA+PROBE: NEGATIVE
SPECIMEN SOURCE: ABNORMAL
T VAGINALIS DNA VAG QL PROBE+SIG AMP: NEGATIVE

## 2019-10-08 ENCOUNTER — TELEPHONE (OUTPATIENT)
Dept: URGENT CARE | Facility: CLINIC | Age: 59
End: 2019-10-08

## 2019-10-08 ASSESSMENT — ENCOUNTER SYMPTOMS
MUSCULOSKELETAL NEGATIVE: 1
DIZZINESS: 0
CHILLS: 0
NAUSEA: 0
SHORTNESS OF BREATH: 0
FEVER: 0
VOMITING: 0
ABDOMINAL PAIN: 0
DIARRHEA: 0

## 2019-10-08 NOTE — TELEPHONE ENCOUNTER
Spoke to patient and informed her of positive result for chlamydia. She has already taken dose of Azithromycin. All questions answered, she appreciates the phone call.

## 2019-10-08 NOTE — PROGRESS NOTES
"Subjective:      Tammy Alejo is a 59 y.o. female who presents with Exposure to STD (burning, abdominal pain, discharge, exposed to STD  x1 month )            Exposure to STD   This is a new problem. The current episode started 1 to 4 weeks ago (1 month). The problem occurs constantly. The problem has been unchanged. Pertinent negatives include no abdominal pain, chest pain, chills, congestion, fever, nausea, rash, urinary symptoms or vomiting. Nothing aggravates the symptoms. She has tried nothing for the symptoms.     Patient presents to urgent care reporting an exposure to Chlamydia that occurred about 1 month ago. She was recently contacted by a sexual partner informing her she should get tested. She reports slight dysuria and some increased vaginal discharge. No genital rashes, abdominal pain, or back pain.     Review of Systems   Constitutional: Negative for chills and fever.   HENT: Negative for congestion.    Respiratory: Negative for shortness of breath.    Cardiovascular: Negative for chest pain.   Gastrointestinal: Negative for abdominal pain, diarrhea, nausea and vomiting.   Genitourinary: Negative.         + exposure to STD   Musculoskeletal: Negative.    Skin: Negative for rash.   Neurological: Negative for dizziness.        Objective:     /68 (BP Location: Right arm, Patient Position: Sitting, BP Cuff Size: Adult)   Pulse 74   Temp 37.1 °C (98.8 °F) (Temporal)   Resp 16   Ht 1.549 m (5' 1\")   Wt 71.2 kg (157 lb)   SpO2 100%   BMI 29.66 kg/m²      Physical Exam   Constitutional: She is oriented to person, place, and time. She appears well-developed and well-nourished. No distress.   HENT:   Head: Normocephalic and atraumatic.   Eyes: Pupils are equal, round, and reactive to light. Conjunctivae are normal.   Neck: Normal range of motion.   Cardiovascular: Normal rate.   Pulmonary/Chest: Effort normal.   Abdominal: Soft. Bowel sounds are normal. She exhibits no distension. There " is no tenderness. There is no rebound, no guarding, no CVA tenderness, no tenderness at McBurney's point and negative Edmonds's sign.   Genitourinary:   Genitourinary Comments: Exam declined   Musculoskeletal: Normal range of motion.   Neurological: She is alert and oriented to person, place, and time.   Skin: Skin is warm and dry. She is not diaphoretic.   Psychiatric: She has a normal mood and affect. Her behavior is normal.   Nursing note and vitals reviewed.         PMH:  has a past medical history of Allergy, Arrhythmia, Arthritis, Diabetes, Diabetic neuropathy (HCC), Dyslipidemia associated with type 2 diabetes mellitus (HCC) (2/10/2016), Heart murmur, and Hypertension.  MEDS:   Current Outpatient Medications:   •  glipiZIDE (GLUCOTROL) 5 MG Tab, TAKE 1 TABLET BY MOUTH TWICE DAILY, Disp: 180 Tab, Rfl: 0  •  lisinopril-hydrochlorothiazide (PRINZIDE, ZESTORETIC) 20-25 MG per tablet, TAKE 1 TABLET BY MOUTH ONCE DAILY, Disp: 90 Tab, Rfl: 0  •  metformin (GLUCOPHAGE) 1000 MG tablet, TAKE 1 TABLET BY MOUTH TWICE DAILY WITH MEALS, Disp: 180 Tab, Rfl: 0  •  Dulaglutide (TRULICITY) 1.5 MG/0.5ML Solution Pen-injector, Inject 1.5 mg as instructed every 7 days., Disp: 4 PEN, Rfl: 11  •  atorvastatin (LIPITOR) 20 MG Tab, Take 1 Tab by mouth every day., Disp: 90 Tab, Rfl: 3  •  fluconazole (DIFLUCAN) 150 MG tablet, Take 1 Tab by mouth every day., Disp: 2 Tab, Rfl: 0  •  NON SPECIFIED, , Disp: , Rfl:   •  Vitamin D, Cholecalciferol, 1000 UNITS Cap, Take  by mouth., Disp: , Rfl:   •  Cetirizine HCl (ZYRTEC ALLERGY PO), Take  by mouth., Disp: , Rfl:   •  Fish Oil Oil, by Does not apply route., Disp: , Rfl:   •  POTASSIUM PO, Take  by mouth., Disp: , Rfl:   •  Acetaminophen (TYLENOL PO), Take  by mouth., Disp: , Rfl:   •  MAGNESIUM GLUCONATE PO, Take  by mouth., Disp: , Rfl:   ALLERGIES:   Allergies   Allergen Reactions   • Januvia [Sitagliptin] Unspecified     Headaches, nausea, vomiting   • Jardiance [Empagliflozin]       Constipation, rash, palpitations, dizziness   • Sulfa Drugs    • Tape      SURGHX:   Past Surgical History:   Procedure Laterality Date   • CATARACT PHACO WITH IOL Left 4/26/2016    Procedure: CATARACT PHACO WITH IOL;  Surgeon: Yogesh Lennon M.D.;  Location: SURGERY SURGICAL [facility] ORS;  Service:    • CATARACT PHACO WITH IOL Right 4/12/2016    Procedure: CATARACT PHACO WITH IOL;  Surgeon: Yogesh Lennon M.D.;  Location: SURGERY SURGICAL [facility] ORS;  Service:    • CALCANEUS ORIF     • CARPAL TUNNEL ENDOSCOPIC     • DENTAL EXTRACTION(S)     • PRIMARY C SECTION     • TUBAL COAGULATION LAPAROSCOPIC BILATERAL       SOCHX:  reports that she has never smoked. She has never used smokeless tobacco. She reports that she does not drink alcohol or use drugs.  FH: family history includes Alcohol/Drug in her mother; Arthritis in her mother; Cancer in her father; Diabetes in her father and mother; Genetic Disorder in her maternal grandfather; Heart Disease in her father, maternal grandmother, mother, paternal grandfather, and paternal grandmother; Hyperlipidemia in her father, maternal grandmother, mother, paternal grandfather, and paternal grandmother; Hypertension in her father, maternal grandmother, mother, paternal grandfather, and paternal grandmother; Lung Disease in her father; Psychiatric Illness in her sister.    POCT Urinalysis:  Ref Range & Units 3d ago    POC Color Negative yellow    POC Appearance Negative slightly cloudy    POC Leukocyte Esterase Negative trace    POC Nitrites Negative negative    POC Urobiligen Negative (0.2) mg/dL 0.2    POC Protein Negative mg/dL negative    POC Urine PH 5.0 - 8.0 5.0    POC Blood Negative trace    POC Specific Gravity <1.005 - >1.030 1.020    POC Ketones Negative mg/dL negative    POC Bilirubin Negative mg/dL negative    POC Glucose Negative mg/dL negative          Specimen Collected: 10/05/19  3:55 PM   Last Resulted: 10/05/19  4:43 PM          Assessment/Plan:     1.  Exposure to STD  - POCT Urinalysis --> trace leuks, otherwise normal  - CHLAMYDIA/GC PCR URINE OR SWAB; Future  - VAGINAL PATHOGENS DNA PANEL; Future  - azithromycin (ZITHROMAX) 500 MG tablet; Take 2 Tabs by mouth Once for 1 dose.  Dispense: 2 Tab; Refill: 0   - Complete full course of antibiotics as prescribed     Patient self swabbed for GC/Chlamydia and vaginal pathogens at today's visit. Will treat empirically for chlamydia at today's visit, pending results. Advised to abstain from all sexual contact for 2-3 weeks, may RTC or follow up with primary care for repeat testing for cure at that time. The patient demonstrated a good understanding and agreed with the treatment plan.

## 2019-10-23 DIAGNOSIS — I15.2 HYPERTENSION ASSOCIATED WITH DIABETES (HCC): ICD-10-CM

## 2019-10-23 DIAGNOSIS — E11.59 HYPERTENSION ASSOCIATED WITH DIABETES (HCC): ICD-10-CM

## 2019-10-24 RX ORDER — LISINOPRIL AND HYDROCHLOROTHIAZIDE 25; 20 MG/1; MG/1
TABLET ORAL
Qty: 90 TAB | Refills: 0 | Status: SHIPPED | OUTPATIENT
Start: 2019-10-24 | End: 2020-01-20

## 2019-10-24 RX ORDER — GLIPIZIDE 5 MG/1
TABLET ORAL
Qty: 180 TAB | Refills: 0 | Status: SHIPPED | OUTPATIENT
Start: 2019-10-24 | End: 2020-01-20

## 2019-10-24 NOTE — TELEPHONE ENCOUNTER
Was the patient seen in the last year in this department? Yes    Does patient have an active prescription for medications requested? No     Received Request Via: Pharmacy      Pt met protocol?: Yes    OV 9/19     BP Readings from Last 1 Encounters:   10/05/19 130/68

## 2019-12-26 ENCOUNTER — OFFICE VISIT (OUTPATIENT)
Dept: MEDICAL GROUP | Facility: PHYSICIAN GROUP | Age: 59
End: 2019-12-26
Payer: COMMERCIAL

## 2019-12-26 VITALS
OXYGEN SATURATION: 98 % | WEIGHT: 158 LBS | DIASTOLIC BLOOD PRESSURE: 60 MMHG | HEART RATE: 85 BPM | BODY MASS INDEX: 29.83 KG/M2 | TEMPERATURE: 98.4 F | SYSTOLIC BLOOD PRESSURE: 110 MMHG | HEIGHT: 61 IN

## 2019-12-26 DIAGNOSIS — Z11.59 NEED FOR HEPATITIS C SCREENING TEST: ICD-10-CM

## 2019-12-26 PROCEDURE — 99213 OFFICE O/P EST LOW 20 MIN: CPT | Performed by: PHYSICIAN ASSISTANT

## 2019-12-27 NOTE — PATIENT INSTRUCTIONS
Canagliflozin oral tablets  What is this medicine?  CANAGLIFLOZIN (ALLISON a gli FLOE zin) helps to treat type 2 diabetes. It helps to control blood sugar. Treatment is combined with diet and exercise.  This medicine may be used for other purposes; ask your health care provider or pharmacist if you have questions.  COMMON BRAND NAME(S): Huyen  What should I tell my health care provider before I take this medicine?  They need to know if you have any of these conditions:  -artery disease  -dehydration  -diabetic ketoacidosis  -diet low in salt  -eating less due to illness, surgery, dieting, or any other reason  -foot sores  -having surgery  -high cholesterol  -high levels of potassium in the blood  -history of amputation  -history of pancreatitis or pancreas problems  -history of yeast infection of the penis or vagina  -if you often drink alcohol  -infections in the bladder, kidneys, or urinary tract  -kidney disease  -liver disease  -low blood pressure  -nerve damage  -on hemodialysis  -problems urinating  -type 1 diabetes  -uncircumcised male  -an unusual or allergic reaction to canagliflozin, other medicines, foods, dyes, or preservatives  -pregnant or trying to get pregnant  -breast-feeding  How should I use this medicine?  Take this medicine by mouth with a glass of water. Follow the directions on the prescription label. Take it before the first meal of the day. Take your dose at the same time each day. Do not take more often than directed. Do not stop taking except on your doctor's advice.  A special MedGuide will be given to you by the pharmacist with each prescription and refill. Be sure to read this information carefully each time.  Talk to your pediatrician regarding the use of this medicine in children. Special care may be needed.  Overdosage: If you think you have taken too much of this medicine contact a poison control center or emergency room at once.  NOTE: This medicine is only for you. Do not share  this medicine with others.  What if I miss a dose?  If you miss a dose, take it as soon as you can. If it is almost time for your next dose, take only that dose. Do not take double or extra doses.  What may interact with this medicine?  Do not take this medicine with any of the following medications:  -gatifloxacin  This medicine may also interact with the following medications:  -alcohol  -certain medicines for blood pressure, heart disease  -digoxin  -diuretics  -insulin  -nateglinide  -phenobarbital  -phenytoin  -repaglinide  -rifampin  -ritonavir  -sulfonylureas like glimepiride, glipizide, glyburide  This list may not describe all possible interactions. Give your health care provider a list of all the medicines, herbs, non-prescription drugs, or dietary supplements you use. Also tell them if you smoke, drink alcohol, or use illegal drugs. Some items may interact with your medicine.  What should I watch for while using this medicine?  Visit your doctor or health care professional for regular checks on your progress.  This medicine can cause a serious condition in which there is too much acid in the blood. If you develop nausea, vomiting, stomach pain, unusual tiredness, or breathing problems, stop taking this medicine and call your doctor right away. If possible, use a ketone dipstick to check for ketones in your urine.  A test called the HbA1C (A1C) will be monitored. This is a simple blood test. It measures your blood sugar control over the last 2 to 3 months. You will receive this test every 3 to 6 months.  Learn how to check your blood sugar. Learn the symptoms of low and high blood sugar and how to manage them.  Always carry a quick-source of sugar with you in case you have symptoms of low blood sugar. Examples include hard sugar candy or glucose tablets. Make sure others know that you can choke if you eat or drink when you develop serious symptoms of low blood sugar, such as seizures or unconsciousness.  They must get medical help at once.  Tell your doctor or health care professional if you have high blood sugar. You might need to change the dose of your medicine. If you are sick or exercising more than usual, you might need to change the dose of your medicine.  Do not skip meals. Ask your doctor or health care professional if you should avoid alcohol. Many nonprescription cough and cold products contain sugar or alcohol. These can affect blood sugar.  Wear a medical ID bracelet or chain, and carry a card that describes your disease and details of your medicine and dosage times.  What side effects may I notice from receiving this medicine?  Side effects that you should report to your doctor or health care professional as soon as possible:  -allergic reactions like skin rash, itching or hives, swelling of the face, lips, or tongue  -breathing problems  -chest pain  -dizziness  -fast or irregular heartbeat  -feeling faint or lightheaded, falls  -muscle weakness  -nausea, vomiting, unusual stomach upset or pain  -new pain or tenderness, change in skin color, sores or ulcers, or infection in legs or feet  -signs and symptoms of low blood sugar such as feeling anxious, confusion, dizziness, increased hunger, unusually weak or tired, sweating, shakiness, cold, irritable, headache, blurred vision, fast heartbeat, loss of consciousness  -signs and symptoms of a urinary tract infection, such as fever, chills, a burning feeling when urinating, blood in the urine, back pain  -trouble passing urine or change in the amount of urine, including an urgent need to urinate more often, in larger amounts, or at night  -penile discharge, itching, or pain in men  -unusual tiredness  -vaginal discharge, itching, or odor in women  Side effects that usually do not require medical attention (report to your doctor or health care professional if they continue or are bothersome):  -constipation  -mild increase in urination  -thirsty  This list  may not describe all possible side effects. Call your doctor for medical advice about side effects. You may report side effects to FDA at 0-568-YKK-3622.  Where should I keep my medicine?  Keep out of the reach of children.  Store at room temperature between 20 and 25 degrees C (68 and 77 degrees F). Throw away any unused medicine after the expiration date.  NOTE: This sheet is a summary. It may not cover all possible information. If you have questions about this medicine, talk to your doctor, pharmacist, or health care provider.  © 2018 [employer]/Gold Standard (2017-05-16 14:17:20)

## 2019-12-27 NOTE — PROGRESS NOTES
Subjective:   Tammy Alejo is a 59 y.o. female here today for follow-up on diabetes. Is an established patient of mine.    HPI:    Tammy presents to the office today for follow-up regarding her type 2 diabetes.  Last visit with me was back in September 2019.  No changes were made at that time but plan was going to be to start Invokana if her next A1c was still above goal.  She has not yet had this repeated.  Last A1c is from June which was 7.9.  She continues on metformin 1000 mg twice daily, glipizide 5 mg twice daily, and Trulicity 1.5 mg weekly. She endorses consistently high BS readings--fasting between 130-180--and expresses frustration, as she feels that she maintains a low-carbohydrate diet and is very active due to her job as a .       Current medicines (including changes today)  Current Outpatient Medications   Medication Sig Dispense Refill   • Canagliflozin 100 MG Tab Take 100 mg by mouth every day. 30 Tab 2   • metformin (GLUCOPHAGE) 1000 MG tablet TAKE 1 TABLET BY MOUTH TWICE DAILY WITH MEALS 180 Tab 0   • lisinopril-hydrochlorothiazide (PRINZIDE, ZESTORETIC) 20-25 MG per tablet TAKE 1 TABLET BY MOUTH ONCE DAILY 90 Tab 0   • glipiZIDE (GLUCOTROL) 5 MG Tab TAKE 1 TABLET BY MOUTH TWICE DAILY 180 Tab 0   • Dulaglutide (TRULICITY) 1.5 MG/0.5ML Solution Pen-injector Inject 1.5 mg as instructed every 7 days. 4 PEN 11   • atorvastatin (LIPITOR) 20 MG Tab Take 1 Tab by mouth every day. 90 Tab 3   • Vitamin D, Cholecalciferol, 1000 UNITS Cap Take  by mouth.     • Cetirizine HCl (ZYRTEC ALLERGY PO) Take  by mouth.     • Fish Oil Oil by Does not apply route.     • POTASSIUM PO Take  by mouth.     • Acetaminophen (TYLENOL PO) Take  by mouth.     • MAGNESIUM GLUCONATE PO Take  by mouth.     • fluconazole (DIFLUCAN) 150 MG tablet Take 1 Tab by mouth every day. (Patient not taking: Reported on 12/26/2019) 2 Tab 0   • NON SPECIFIED        No current facility-administered medications for this  "visit.      She  has a past medical history of Allergy, Arrhythmia, Arthritis, Diabetes, Diabetic neuropathy (HCC), Dyslipidemia associated with type 2 diabetes mellitus (HCC) (2/10/2016), Heart murmur, and Hypertension.    ROS  As per HPI.       Objective:     /60 (BP Location: Left arm, Patient Position: Sitting, BP Cuff Size: Adult)   Pulse 85   Temp 36.9 °C (98.4 °F) (Temporal)   Ht 1.549 m (5' 1\")   Wt 71.7 kg (158 lb)   SpO2 98%  Body mass index is 29.85 kg/m².     Physical Exam:  Constitutional: Alert, well-appearing, very pleasant, no distress.  Skin: No rashes in visible areas.  Eye: Pupils are equal and round, conjunctiva clear, lids normal.  ENMT: Lips without lesions, moist mucus membranes.  Neck: Normal-appearing, no edema.  Respiratory: Unlabored respiratory effort.      Assessment and Plan:   The following treatment plan was discussed    1. Uncontrolled type 2 diabetes mellitus without complication, without long-term current use of insulin (HCC)  Established problem, uncontrolled. Advised to have repeat A1c done. Based on home fasting BS readings, suspect will remain above-goal. Will start on Invokana 100 mg daily with follow up in 6 weeks. If still high, plan will likely be to increase to 300 mg daily assuming she tolerates OK. Did previously have some side effects with Jardiance. In the meantime she will continue working on her diet. Recommend small, frequent high-protein meals, continued carbohydrate limitation, and continued regular physical activity.  - Canagliflozin 100 MG Tab; Take 100 mg by mouth every day.  Dispense: 30 Tab; Refill: 2    2. Need for hepatitis C screening test  - HCV Scrn ( [id number] 1xLife); Future      Followup: Return in about 6 weeks (around 2020).    Wendi Lind P.A.-C.             "

## 2020-01-17 DIAGNOSIS — I15.2 HYPERTENSION ASSOCIATED WITH DIABETES (HCC): ICD-10-CM

## 2020-01-17 DIAGNOSIS — E11.59 HYPERTENSION ASSOCIATED WITH DIABETES (HCC): ICD-10-CM

## 2020-01-20 RX ORDER — LISINOPRIL AND HYDROCHLOROTHIAZIDE 25; 20 MG/1; MG/1
TABLET ORAL
Qty: 90 TAB | Refills: 0 | Status: SHIPPED | OUTPATIENT
Start: 2020-01-20 | End: 2020-04-23

## 2020-01-20 RX ORDER — GLIPIZIDE 5 MG/1
TABLET ORAL
Qty: 180 TAB | Refills: 0 | Status: SHIPPED | OUTPATIENT
Start: 2020-01-20 | End: 2020-04-23

## 2020-01-20 NOTE — TELEPHONE ENCOUNTER
Was the patient seen in the last year in this department? Yes    Does patient have an active prescription for medications requested? No     Received Request Via: Pharmacy      Pt met protocol?: Yes, OV last month   BP Readings from Last 1 Encounters:   12/26/19 110/60     Lab Results   Component Value Date/Time    HBA1C 7.9 (A) 06/27/2019 05:49 PM

## 2020-02-06 ENCOUNTER — OFFICE VISIT (OUTPATIENT)
Dept: MEDICAL GROUP | Facility: PHYSICIAN GROUP | Age: 60
End: 2020-02-06
Payer: COMMERCIAL

## 2020-02-06 VITALS
BODY MASS INDEX: 29.27 KG/M2 | WEIGHT: 155 LBS | HEIGHT: 61 IN | SYSTOLIC BLOOD PRESSURE: 106 MMHG | HEART RATE: 82 BPM | OXYGEN SATURATION: 98 % | TEMPERATURE: 97.4 F | DIASTOLIC BLOOD PRESSURE: 52 MMHG

## 2020-02-06 DIAGNOSIS — Z12.39 ENCOUNTER FOR SCREENING FOR MALIGNANT NEOPLASM OF BREAST: ICD-10-CM

## 2020-02-06 DIAGNOSIS — E11.69 DYSLIPIDEMIA ASSOCIATED WITH TYPE 2 DIABETES MELLITUS (HCC): ICD-10-CM

## 2020-02-06 DIAGNOSIS — E78.5 DYSLIPIDEMIA ASSOCIATED WITH TYPE 2 DIABETES MELLITUS (HCC): ICD-10-CM

## 2020-02-06 DIAGNOSIS — Z23 NEED FOR VACCINATION: ICD-10-CM

## 2020-02-06 LAB
HBA1C MFR BLD: 7.9 % (ref 0–5.6)
INT CON NEG: NEGATIVE
INT CON POS: POSITIVE

## 2020-02-06 PROCEDURE — 90471 IMMUNIZATION ADMIN: CPT | Performed by: PHYSICIAN ASSISTANT

## 2020-02-06 PROCEDURE — 99214 OFFICE O/P EST MOD 30 MIN: CPT | Mod: 25 | Performed by: PHYSICIAN ASSISTANT

## 2020-02-06 PROCEDURE — 90750 HZV VACC RECOMBINANT IM: CPT | Performed by: PHYSICIAN ASSISTANT

## 2020-02-06 PROCEDURE — 83036 HEMOGLOBIN GLYCOSYLATED A1C: CPT | Performed by: PHYSICIAN ASSISTANT

## 2020-02-06 NOTE — PROGRESS NOTES
Subjective:   Tammy Alejo is a 59 y.o. female here today for follow-up on diabetes. Is an established patient of mine.    HPI:    Patient presents to the office today for follow-up on type 2 diabetes.  Last visit with me was on 12/26.  At that time, home fasting blood sugar readings were consistently elevated and she was prescribed additional medication in the form of Invokana 100 mg daily. She states she has not yet started this due to switching insurances. She is also on metformin 1000 mg twice daily, glipizide 5 mg twice daily, and Trulicity 1.5 mg weekly.  Has not yet had repeat A1c done.  Her A1c in June 2019 was 7.9. She continues to notice elevated fastin BS readings--typically 170s-180s. She continues on atorvastatin for dyslipidemia. Dose was increased to 20 mg daily in 3/2019 and lipids have not been re-checked since that time.      Current medicines (including changes today)  Current Outpatient Medications   Medication Sig Dispense Refill   • Canagliflozin 100 MG Tab Take 100 mg by mouth every day. 30 Tab 2   • lisinopril-hydrochlorothiazide (PRINZIDE) 20-25 MG per tablet TAKE 1 TABLET BY MOUTH ONCE DAILY 90 Tab 0   • metformin (GLUCOPHAGE) 1000 MG tablet TAKE 1 TABLET BY MOUTH TWICE DAILY WITH MEALS 180 Tab 0   • glipiZIDE (GLUCOTROL) 5 MG Tab TAKE 1 TABLET BY MOUTH TWICE DAILY 180 Tab 0   • Dulaglutide (TRULICITY) 1.5 MG/0.5ML Solution Pen-injector Inject 1.5 mg as instructed every 7 days. 4 PEN 11   • atorvastatin (LIPITOR) 20 MG Tab Take 1 Tab by mouth every day. 90 Tab 3   • Vitamin D, Cholecalciferol, 1000 UNITS Cap Take  by mouth.     • Cetirizine HCl (ZYRTEC ALLERGY PO) Take  by mouth.     • Fish Oil Oil by Does not apply route.     • POTASSIUM PO Take  by mouth.     • Acetaminophen (TYLENOL PO) Take  by mouth.     • MAGNESIUM GLUCONATE PO Take  by mouth.     • NON SPECIFIED        No current facility-administered medications for this visit.      She  has a past medical history of  "Allergy, Arrhythmia, Arthritis, Diabetes, Diabetic neuropathy (HCC), Dyslipidemia associated with type 2 diabetes mellitus (HCC) (2/10/2016), Heart murmur, and Hypertension.    ROS  As per HPI.       Objective:     /52 (BP Location: Left arm, Patient Position: Sitting, BP Cuff Size: Adult)   Pulse 82   Temp 36.3 °C (97.4 °F) (Tympanic)   Ht 1.549 m (5' 1\")   Wt 70.3 kg (155 lb)   SpO2 98%  Body mass index is 29.29 kg/m².     Physical Exam:  Constitutional: Alert, well-appearing, pleasant, no distress.  Skin: No rashes in visible areas.  Eye: Pupils are equal and round, conjunctiva clear, lids normal.  ENMT: Lips without lesions, moist mucus membranes.  Neck: Normal-appearing.  Respiratory: Unlabored respiratory effort, lungs clear to auscultation, no wheezes, no rhonchi.  Cardiovascular: Normal S1, S2, no murmur.      Assessment and Plan:   The following treatment plan was discussed    1. Uncontrolled type 2 diabetes mellitus without complication, without long-term current use of insulin (HCC)  Established problem, remains uncontrolled. A1c today in the office is 7.9. Continue to recommend starting Invokana. We discussed that if prior authorization is needed, pharmacy will need to notify our office so process can be started.   - Canagliflozin 100 MG Tab; Take 100 mg by mouth every day.  Dispense: 30 Tab; Refill: 2  - POCT  A1C    2. Dyslipidemia associated with type 2 diabetes mellitus (HCC)  Established problem, suspect well-controlled with higher dose of atorvastatin. Overdue for repeat lipid check which I have ordered.  - Lipid Profile; Future    3. Encounter for screening for malignant neoplasm of breast  - MA-SCREEN MAMMO W/CAD-BILAT; Future    4. Need for vaccination  - Shingles Vaccine (Shingrix)      Followup: Return in about 4 months (around 6/6/2020).    Wendi Lind P.A.-C.             "

## 2020-02-06 NOTE — NON-PROVIDER
"Tammy Alejo is a 59 y.o. female here for a non-provider visit for:   SHINGRIX (Shingles)    Reason for immunization: Overdue/Provider Recommended  Immunization records indicate need for vaccine: Yes, confirmed with Epic  Minimum interval has been met for this vaccine: Yes  ABN completed: Not Indicated    Order and dose verified by: EF  VIS Dated  02/12/2018 was given to patient: Yes  All IAC Questionnaire questions were answered \"No.\"    Patient tolerated injection and no adverse effects were observed or reported: Yes    Pt scheduled for next dose in series: Not Indicated  "

## 2020-02-10 ENCOUNTER — HOSPITAL ENCOUNTER (OUTPATIENT)
Dept: LAB | Facility: MEDICAL CENTER | Age: 60
End: 2020-02-10
Attending: PHYSICIAN ASSISTANT
Payer: COMMERCIAL

## 2020-02-10 DIAGNOSIS — E11.69 DYSLIPIDEMIA ASSOCIATED WITH TYPE 2 DIABETES MELLITUS (HCC): ICD-10-CM

## 2020-02-10 DIAGNOSIS — E78.5 DYSLIPIDEMIA ASSOCIATED WITH TYPE 2 DIABETES MELLITUS (HCC): ICD-10-CM

## 2020-02-10 DIAGNOSIS — Z11.59 NEED FOR HEPATITIS C SCREENING TEST: ICD-10-CM

## 2020-02-10 LAB
CHOLEST SERPL-MCNC: 106 MG/DL (ref 100–199)
FASTING STATUS PATIENT QL REPORTED: NORMAL
HCV AB S/CO SERPL IA: NEGATIVE
HDLC SERPL-MCNC: 35 MG/DL
LDLC SERPL CALC-MCNC: 50 MG/DL
TRIGL SERPL-MCNC: 104 MG/DL (ref 0–149)

## 2020-02-10 PROCEDURE — 80061 LIPID PANEL: CPT

## 2020-02-10 PROCEDURE — G0472 HEP C SCREEN HIGH RISK/OTHER: HCPCS

## 2020-02-10 PROCEDURE — 36415 COLL VENOUS BLD VENIPUNCTURE: CPT

## 2020-04-22 DIAGNOSIS — E11.59 HYPERTENSION ASSOCIATED WITH DIABETES (HCC): ICD-10-CM

## 2020-04-22 DIAGNOSIS — I15.2 HYPERTENSION ASSOCIATED WITH DIABETES (HCC): ICD-10-CM

## 2020-04-22 DIAGNOSIS — E78.5 DYSLIPIDEMIA ASSOCIATED WITH TYPE 2 DIABETES MELLITUS (HCC): ICD-10-CM

## 2020-04-22 DIAGNOSIS — E11.69 DYSLIPIDEMIA ASSOCIATED WITH TYPE 2 DIABETES MELLITUS (HCC): ICD-10-CM

## 2020-04-22 NOTE — TELEPHONE ENCOUNTER
Was the patient seen in the last year in this department? Yes    Does patient have an active prescription for medications requested? No     Received Request Via: Pharmacy      Pt met protocol?: Yes   Pt last ov 2/2020   BP Readings from Last 1 Encounters:   02/06/20 106/52     Lab Results   Component Value Date/Time    HBA1C 7.9 (A) 02/06/2020 11:20 AM      Lab Results   Component Value Date/Time    CHOLSTRLTOT 106 02/10/2020 1005    TRIGLYCERIDE 104 02/10/2020 1005    HDL 35 (A) 02/10/2020 1005    LDL 50 02/10/2020 1005     Lab Results   Component Value Date/Time    SODIUM 139 06/21/2019 09:39 AM    POTASSIUM 3.7 06/21/2019 09:39 AM    CHLORIDE 99 06/21/2019 09:39 AM    CO2 28 06/21/2019 09:39 AM    GLUCOSE 188 (H) 06/21/2019 09:39 AM    BUN 20 06/21/2019 09:39 AM    CREATININE 0.77 06/21/2019 09:39 AM

## 2020-04-23 RX ORDER — ATORVASTATIN CALCIUM 20 MG/1
TABLET, FILM COATED ORAL
Qty: 90 TAB | Refills: 3 | Status: SHIPPED | OUTPATIENT
Start: 2020-04-23 | End: 2021-04-12 | Stop reason: SDUPTHER

## 2020-04-23 RX ORDER — GLIPIZIDE 5 MG/1
TABLET ORAL
Qty: 180 TAB | Refills: 0 | Status: SHIPPED | OUTPATIENT
Start: 2020-04-23 | End: 2020-07-22

## 2020-04-23 RX ORDER — LISINOPRIL AND HYDROCHLOROTHIAZIDE 25; 20 MG/1; MG/1
TABLET ORAL
Qty: 90 TAB | Refills: 0 | Status: SHIPPED | OUTPATIENT
Start: 2020-04-23 | End: 2020-07-22

## 2020-04-23 NOTE — TELEPHONE ENCOUNTER
Pt has had OV within the 12 month protocol and lipid panel is current. 12 month supply sent to pharmacy on chol meds and upcoming appt so 3 months sent on CM and BP meds.   Lab Results   Component Value Date/Time    CHOLSTRLTOT 106 02/10/2020 10:05 AM    LDL 50 02/10/2020 10:05 AM    HDL 35 (A) 02/10/2020 10:05 AM    TRIGLYCERIDE 104 02/10/2020 10:05 AM       Lab Results   Component Value Date/Time    SODIUM 139 06/21/2019 09:39 AM    POTASSIUM 3.7 06/21/2019 09:39 AM    CHLORIDE 99 06/21/2019 09:39 AM    CO2 28 06/21/2019 09:39 AM    GLUCOSE 188 (H) 06/21/2019 09:39 AM    BUN 20 06/21/2019 09:39 AM    CREATININE 0.77 06/21/2019 09:39 AM     Lab Results   Component Value Date/Time    ALKPHOSPHAT 60 06/21/2019 09:39 AM    ASTSGOT 17 06/21/2019 09:39 AM    ALTSGPT 24 06/21/2019 09:39 AM    TBILIRUBIN 0.6 06/21/2019 09:39 AM

## 2020-06-15 ENCOUNTER — OFFICE VISIT (OUTPATIENT)
Dept: MEDICAL GROUP | Facility: PHYSICIAN GROUP | Age: 60
End: 2020-06-15
Payer: COMMERCIAL

## 2020-06-15 VITALS
BODY MASS INDEX: 29.64 KG/M2 | DIASTOLIC BLOOD PRESSURE: 68 MMHG | SYSTOLIC BLOOD PRESSURE: 140 MMHG | HEIGHT: 61 IN | WEIGHT: 157 LBS | OXYGEN SATURATION: 95 % | TEMPERATURE: 96.9 F | HEART RATE: 89 BPM

## 2020-06-15 DIAGNOSIS — E11.59 HYPERTENSION ASSOCIATED WITH DIABETES (HCC): ICD-10-CM

## 2020-06-15 DIAGNOSIS — E11.65 TYPE 2 DIABETES MELLITUS WITH HYPERGLYCEMIA, WITHOUT LONG-TERM CURRENT USE OF INSULIN (HCC): ICD-10-CM

## 2020-06-15 DIAGNOSIS — Z23 NEED FOR VACCINATION: ICD-10-CM

## 2020-06-15 DIAGNOSIS — I15.2 HYPERTENSION ASSOCIATED WITH DIABETES (HCC): ICD-10-CM

## 2020-06-15 DIAGNOSIS — E78.5 DYSLIPIDEMIA ASSOCIATED WITH TYPE 2 DIABETES MELLITUS (HCC): ICD-10-CM

## 2020-06-15 DIAGNOSIS — E11.69 DYSLIPIDEMIA ASSOCIATED WITH TYPE 2 DIABETES MELLITUS (HCC): ICD-10-CM

## 2020-06-15 LAB
HBA1C MFR BLD: 9.7 % (ref 0–5.6)
INT CON NEG: NEGATIVE
INT CON POS: POSITIVE

## 2020-06-15 PROCEDURE — 99214 OFFICE O/P EST MOD 30 MIN: CPT | Mod: 25 | Performed by: PHYSICIAN ASSISTANT

## 2020-06-15 PROCEDURE — 90471 IMMUNIZATION ADMIN: CPT | Performed by: PHYSICIAN ASSISTANT

## 2020-06-15 PROCEDURE — 90750 HZV VACC RECOMBINANT IM: CPT | Performed by: PHYSICIAN ASSISTANT

## 2020-06-15 PROCEDURE — 83036 HEMOGLOBIN GLYCOSYLATED A1C: CPT | Performed by: PHYSICIAN ASSISTANT

## 2020-06-15 RX ORDER — DULAGLUTIDE 1.5 MG/.5ML
1.5 INJECTION, SOLUTION SUBCUTANEOUS
Qty: 4 PEN | Refills: 11 | Status: SHIPPED | OUTPATIENT
Start: 2020-06-15 | End: 2021-04-12 | Stop reason: SDUPTHER

## 2020-06-15 RX ORDER — DAPAGLIFLOZIN 5 MG/1
5 TABLET, FILM COATED ORAL DAILY
Qty: 30 TAB | Refills: 1 | Status: SHIPPED | OUTPATIENT
Start: 2020-06-15 | End: 2020-07-16

## 2020-06-15 ASSESSMENT — PATIENT HEALTH QUESTIONNAIRE - PHQ9: CLINICAL INTERPRETATION OF PHQ2 SCORE: 0

## 2020-06-15 NOTE — PROGRESS NOTES
Subjective:   Tammy Alejo is a 59 y.o. female here today for follow-up on DM2. Is an established patient of mine.    HPI:    Patient presents to the office today for routine follow-up. She is a type 2 diabetic currently treated with metformin 1000 mg BID, glipizide 5 mg BID, and Trulicity 1.5 mg weekly.  I previously prescribed Invokana but she still has not started this as her insurance denied it. A1c was 7.9 at last visit in February. She states that she has noticed worsening blood sugars since then which she attributes to being mostly at home over this quarantine period and eating poorly. Fasting blood sugars have been consistently around 200.    She is compliant with daily statin--atorvastatin 20 mg daily. Lipids were re-checked just after last visit in February with LDL of 50.    She continues on lisinopril-HCTZ 20-25 mg daily for hypertension. BP today in the office is 140/68. She has not been checking readings at home at all.      Current medicines (including changes today)  Current Outpatient Medications   Medication Sig Dispense Refill   • Nutritional Supplements (ESTROVEN PO) Take  by mouth.     • Dapagliflozin Propanediol (FARXIGA) 5 MG Tab Take 5 mg by mouth every day. 30 Tab 1   • Dulaglutide (TRULICITY) 1.5 MG/0.5ML Solution Pen-injector Inject 1.5 mg as instructed every 7 days. 4 PEN 11   • atorvastatin (LIPITOR) 20 MG Tab Take 1 tablet by mouth once daily 90 Tab 3   • metformin (GLUCOPHAGE) 1000 MG tablet TAKE 1 TABLET BY MOUTH TWICE DAILY WITH MEALS 180 Tab 0   • lisinopril-hydrochlorothiazide (PRINZIDE) 20-25 MG per tablet Take 1 tablet by mouth once daily 90 Tab 0   • glipiZIDE (GLUCOTROL) 5 MG Tab Take 1 tablet by mouth twice daily 180 Tab 0   • NON SPECIFIED      • Vitamin D, Cholecalciferol, 1000 UNITS Cap Take  by mouth.     • Cetirizine HCl (ZYRTEC ALLERGY PO) Take  by mouth.     • Fish Oil Oil by Does not apply route.     • POTASSIUM PO Take  by mouth.     • Acetaminophen  "(TYLENOL PO) Take  by mouth.       No current facility-administered medications for this visit.      She  has a past medical history of Allergy, Arrhythmia, Arthritis, Diabetes, Diabetic neuropathy (HCC), Dyslipidemia associated with type 2 diabetes mellitus (HCC) (2/10/2016), Heart murmur, and Hypertension.    ROS  No chest pain  No SOB     Objective:     /68 (BP Location: Left arm, Patient Position: Sitting, BP Cuff Size: Adult)   Pulse 89   Temp 36.1 °C (96.9 °F) (Tympanic)   Ht 1.549 m (5' 1\")   Wt 71.2 kg (157 lb)   SpO2 95%  Body mass index is 29.66 kg/m².     Physical Exam:  Constitutional: Alert, well-appearing, no distress.  Skin: No rashes in visible areas.  Eye: Pupils are equal and round, conjunctiva clear, lids normal.  ENMT: Lips without lesions, moist mucus membranes.  Neck: Normal-appearing.  Respiratory: Unlabored respiratory effort, lungs clear to auscultation, no wheezes, no rhonchi.  Cardiovascular: Normal S1, S2, no murmur.      Assessment and Plan:   The following treatment plan was discussed    1. Type 2 diabetes mellitus with hyperglycemia, without long-term current use of insulin ([facility])  Established problem, uncontrolled.  A1c has gone up significantly--from 7.9 in February to 9.7 now.  This appears to be related to poor eating habits.  She does express motivation to improve her eating habits and cut back on carbohydrates and sweets.  She was never able to  the Invokana I prescribed due to insurance issue.  I did have my MA look into this and it appears that Invokana is not on her insurance's formulary, but traci SU is, so we will switch to that.  Should also continue metformin, glipizide, and Trulicity.  Discussed increased incidence of genital yeast infection with traci SU.  Advised to let me know if she experiences classic yeast infection symptoms like itching, discharge, burning so she can be treated appropriately.  I would like to see her back in 1 month.  Should " bring home blood sugar readings to that office visit.  - POCT  A1C  - Dapagliflozin Propanediol (FARXIGA) 5 MG Tab; Take 5 mg by mouth every day.  Dispense: 30 Tab; Refill: 1  - Dulaglutide (TRULICITY) 1.5 MG/0.5ML Solution Pen-injector; Inject 1.5 mg as instructed every 7 days.  Dispense: 4 PEN; Refill: 11    2. Dyslipidemia associated with type 2 diabetes mellitus (HCC)  Established problem, well-controlled with atorvastatin 20 mg daily. LDL is at-goal. Continue current management.    3. Hypertension associated with diabetes (HCC)  Established problem, typically well controlled with lisinopril-HCTZ.  Blood pressure today is high.  I would like her to start checking home readings over the next couple of weeks.  Can discuss again when she comes in for her next office visit.  If blood pressure persistently above goal of less than 130/80, will adjust her medication.    4. Need for vaccination  Due for second Shingrix. She does mention that she developed a small blistering rash on her ankle after getting the vaccine last time. Doubtful that this was related to vaccine given that it is inactivated. OK to receive second dose.  - Shingles Vaccine (Shingrix)      Followup: Return in about 4 weeks (around 7/13/2020) for f/u DM2, HTN; Short.    Wendi Lind P.A.-C.

## 2020-06-15 NOTE — PATIENT INSTRUCTIONS
Dapagliflozin tablets  What is this medicine?  DAPAGLIFLOZIN (DAP a gli FLOE zin) helps to treat type 2 diabetes. It helps to control blood sugar. Treatment is combined with diet and exercise.  This medicine may be used for other purposes; ask your health care provider or pharmacist if you have questions.  COMMON BRAND NAME(S): Farxiga  What should I tell my health care provider before I take this medicine?  They need to know if you have any of these conditions:  -bladder cancer  -dehydration  -diabetic ketoacidosis  -diet low in salt  -eating less due to illness, surgery, dieting, or any other reason  -having surgery  -high cholesterol  -history of pancreatitis or pancreas problems  -history of yeast infection of the penis or vagina  -if you often drink alcohol  -infections in the bladder, kidneys, or urinary tract  -kidney disease  -low blood pressure  -on hemodialysis  -problems urinating  -type 1 diabetes  -uncircumcised male  -an unusual or allergic reaction to dapagliflozin, other medicines, foods, dyes, or preservatives  -pregnant or trying to get pregnant  -breast-feeding  How should I use this medicine?  Take this medicine by mouth with a glass of water. Follow the directions on the prescription label. You can take it with or without food. If it upsets your stomach, take it with food. Take this medicine in the morning. Take your dose at the same time each day. Do not take more often than directed. Do not stop taking except on your doctor's advice.  A special MedGuide will be given to you by the pharmacist with each prescription and refill. Be sure to read this information carefully each time.  Talk to your pediatrician regarding the use of this medicine in children. Special care may be needed.  Overdosage: If you think you have taken too much of this medicine contact a poison control center or emergency room at once.  NOTE: This medicine is only for you. Do not share this medicine with others.  What if I  miss a dose?  If you miss a dose, take it as soon as you can. If it is almost time for your next dose, take only that dose. Do not take double or extra doses.  What may interact with this medicine?  Do not take this medicine with any of the following medications:  -gatifloxacinThis medicine may also interact with the following medications:  -alcohol  -certain medicines for blood pressure, heart disease  -diuretics  -insulin  -nateglinide  -pioglitazone  -quinolone antibiotics like ciprofloxacin, levofloxacin, ofloxacin  -repaglinide  -some herbal dietary supplements  -steroid medicines like prednisone or cortisone  -sulfonylureas like glimepiride, glipizide, glyburide  -thyroid medicine  This list may not describe all possible interactions. Give your health care provider a list of all the medicines, herbs, non-prescription drugs, or dietary supplements you use. Also tell them if you smoke, drink alcohol, or use illegal drugs. Some items may interact with your medicine.  What should I watch for while using this medicine?  Visit your doctor or health care professional for regular checks on your progress.  This medicine can cause a serious condition in which there is too much acid in the blood. If you develop nausea, vomiting, stomach pain, unusual tiredness, or breathing problems, stop taking this medicine and call your doctor right away. If possible, use a ketone dipstick to check for ketones in your urine.  A test called the HbA1C (A1C) will be monitored. This is a simple blood test. It measures your blood sugar control over the last 2 to 3 months. You will receive this test every 3 to 6 months.  Learn how to check your blood sugar. Learn the symptoms of low and high blood sugar and how to manage them.  Always carry a quick-source of sugar with you in case you have symptoms of low blood sugar. Examples include hard sugar candy or glucose tablets. Make sure others know that you can choke if you eat or drink when you  develop serious symptoms of low blood sugar, such as seizures or unconsciousness. They must get medical help at once.  Tell your doctor or health care professional if you have high blood sugar. You might need to change the dose of your medicine. If you are sick or exercising more than usual, you might need to change the dose of your medicine.  Do not skip meals. Ask your doctor or health care professional if you should avoid alcohol. Many nonprescription cough and cold products contain sugar or alcohol. These can affect blood sugar.  Wear a medical ID bracelet or chain, and carry a card that describes your disease and details of your medicine and dosage times.  What side effects may I notice from receiving this medicine?  Side effects that you should report to your doctor or health care professional as soon as possible:  -allergic reactions like skin rash, itching or hives, swelling of the face, lips, or tongue  -breathing problems  -dizziness  -feeling faint or lightheaded, falls  -muscle weakness  -nausea, vomiting, unusual stomach upset or pain  -signs and symptoms of low blood sugar such as feeling anxious, confusion, dizziness, increased hunger, unusually weak or tired, sweating, shakiness, cold, irritable, headache, blurred vision, fast heartbeat, loss of consciousness  -signs and symptoms of a urinary tract infection, such as fever, chills, a burning feeling when urinating, blood in the urine, back pain  -trouble passing urine or change in the amount of urine, including an urgent need to urinate more often, in larger amounts, or at night  -penile discharge, itching, or pain in men  -unusual tiredness  -vaginal discharge, itching, or odor in women  Side effects that usually do not require medical attention (report to your doctor or health care professional if they continue or are bothersome):  -constipation  -mild increase in urination  -stuffy or runny nose  -sore throat  -thirsty  This list may not describe  all possible side effects. Call your doctor for medical advice about side effects. You may report side effects to FDA at 5-295-NXL-5841.  Where should I keep my medicine?  Keep out of the reach of children.  Store at room temperature between 15 and 30 degrees C (59 and 86 degrees F). Throw away any unused medicine after the expiration date.  NOTE: This sheet is a summary. It may not cover all possible information. If you have questions about this medicine, talk to your doctor, pharmacist, or health care provider.  © 2018 [employer] (2017-01-19 08:46:40)

## 2020-07-16 ENCOUNTER — OFFICE VISIT (OUTPATIENT)
Dept: MEDICAL GROUP | Facility: PHYSICIAN GROUP | Age: 60
End: 2020-07-16
Payer: COMMERCIAL

## 2020-07-16 VITALS
OXYGEN SATURATION: 97 % | TEMPERATURE: 97.8 F | DIASTOLIC BLOOD PRESSURE: 70 MMHG | WEIGHT: 157 LBS | BODY MASS INDEX: 29.64 KG/M2 | SYSTOLIC BLOOD PRESSURE: 132 MMHG | HEIGHT: 61 IN | HEART RATE: 78 BPM

## 2020-07-16 DIAGNOSIS — Z12.12 SCREENING FOR COLORECTAL CANCER: ICD-10-CM

## 2020-07-16 DIAGNOSIS — E11.65 TYPE 2 DIABETES MELLITUS WITH HYPERGLYCEMIA, WITHOUT LONG-TERM CURRENT USE OF INSULIN (HCC): ICD-10-CM

## 2020-07-16 DIAGNOSIS — E11.59 HYPERTENSION ASSOCIATED WITH DIABETES (HCC): ICD-10-CM

## 2020-07-16 DIAGNOSIS — Z12.11 SCREENING FOR COLORECTAL CANCER: ICD-10-CM

## 2020-07-16 DIAGNOSIS — I15.2 HYPERTENSION ASSOCIATED WITH DIABETES (HCC): ICD-10-CM

## 2020-07-16 PROCEDURE — 99214 OFFICE O/P EST MOD 30 MIN: CPT | Performed by: PHYSICIAN ASSISTANT

## 2020-07-16 RX ORDER — DAPAGLIFLOZIN 10 MG/1
10 TABLET, FILM COATED ORAL DAILY
Qty: 90 TAB | Refills: 1 | Status: SHIPPED | OUTPATIENT
Start: 2020-07-16 | End: 2021-01-07

## 2020-07-17 NOTE — PROGRESS NOTES
Subjective:   Tammy Alejo is a 60 y.o. female here today for follow-up on DM, HTN. Is an established patient of mine.    HPI:    Patient presents to the office today for follow-up on above issues.  Last visit with me was a month ago.  At that time, dapagliflozin was added to her regimen given persistently elevated blood sugars despite metformin, glipizide, and Trulicity.  A1c in the office was 9.7.  Patient states that she has noticed that her blood sugars are starting to go down.  Fasting sugar today was 167.  She has been also working on improving her diet, eating less carbohydrates and sugar.  She has noticed a mild headache since starting the new medication.  It is not overly bothersome and she states that she has gotten headaches with multiple other diabetic medications in the past so is not overly concerned.    Her hypertension is treated with lisinopril-hydrochlorothiazide 20-25 mg daily.  She did have an elevated blood pressure reading at the last appointment of 140/68, and I asked her to start checking home readings.  She brings a log in with her today.  All of her readings are below 130/80.  Blood pressure today in the office is 132/70.      Current medicines (including changes today)  Current Outpatient Medications   Medication Sig Dispense Refill   • Dapagliflozin Propanediol (FARXIGA) 10 MG Tab Take 10 mg by mouth every day. 90 Tab 1   • Nutritional Supplements (ESTROVEN PO) Take  by mouth.     • Dulaglutide (TRULICITY) 1.5 MG/0.5ML Solution Pen-injector Inject 1.5 mg as instructed every 7 days. 4 PEN 11   • atorvastatin (LIPITOR) 20 MG Tab Take 1 tablet by mouth once daily 90 Tab 3   • metformin (GLUCOPHAGE) 1000 MG tablet TAKE 1 TABLET BY MOUTH TWICE DAILY WITH MEALS 180 Tab 0   • lisinopril-hydrochlorothiazide (PRINZIDE) 20-25 MG per tablet Take 1 tablet by mouth once daily 90 Tab 0   • glipiZIDE (GLUCOTROL) 5 MG Tab Take 1 tablet by mouth twice daily 180 Tab 0   • NON SPECIFIED      •  "Vitamin D, Cholecalciferol, 1000 UNITS Cap Take  by mouth.     • Cetirizine HCl (ZYRTEC ALLERGY PO) Take  by mouth.     • Fish Oil Oil by Does not apply route.     • POTASSIUM PO Take  by mouth.     • Acetaminophen (TYLENOL PO) Take  by mouth.       No current facility-administered medications for this visit.      She  has a past medical history of Allergy, Arrhythmia, Arthritis, Diabetes, Diabetic neuropathy (HCC), Dyslipidemia associated with type 2 diabetes mellitus (HCC) (2/10/2016), Heart murmur, and Hypertension.    ROS  As per HPI.       Objective:     /70 (BP Location: Left arm, Patient Position: Sitting, BP Cuff Size: Adult)   Pulse 78   Temp 36.6 °C (97.8 °F) (Tympanic)   Ht 1.549 m (5' 1\")   Wt 71.2 kg (157 lb)   SpO2 97%  Body mass index is 29.66 kg/m².     Physical Exam:  Constitutional: Alert, well-appearing, very pleasant, no distress.  Skin: No rashes in visible areas.  Eye: Pupils are equal and round, conjunctiva clear, lids normal.  ENMT: Lips without lesions, moist mucus membranes.  Neck: Normal-appearing.  Respiratory: Unlabored respiratory effort.      Assessment and Plan:   The following treatment plan was discussed    1. Type 2 diabetes mellitus with hyperglycemia, without long-term current use of insulin (HCC)  Established problem, uncontrolled but improving with new medication Farxiga.  Will increase dose to 10 mg daily today with continuation of other medications and healthy eating.  Should have A1c rechecked along with other screening lab work in 2 months.  - Dapagliflozin Propanediol (FARXIGA) 10 MG Tab; Take 10 mg by mouth every day.  Dispense: 90 Tab; Refill: 1  - HEMOGLOBIN A1C; Future  - Comp Metabolic Panel; Future  - MICROALBUMIN CREAT RATIO URINE; Future    2. Hypertension associated with diabetes (HCC)  Established problem, well-controlled with lisinopril-hydrochlorothiazide.  Home readings are at goal.  Will continue current management.    3. Screening for colorectal " cancer  - OCCULT BLOOD FECES IMMUNOASSAY; Future      Had discussion with patient regarding recommended health maintenance items for age and chronic conditions.  She is willing to complete FIT.  Declines colonoscopy.  She is scheduled for an upcoming eye exam, at which point she will have her retinal screen done.  She is overdue for Pap smear but is waiting to establish with a new gynecologist.      Followup: Return in about 2 months (around 9/16/2020) for establish care with new PCP.    Wendi Lind P.A.-C.

## 2020-07-18 DIAGNOSIS — I15.2 HYPERTENSION ASSOCIATED WITH DIABETES (HCC): ICD-10-CM

## 2020-07-18 DIAGNOSIS — E11.59 HYPERTENSION ASSOCIATED WITH DIABETES (HCC): ICD-10-CM

## 2020-07-22 RX ORDER — LISINOPRIL AND HYDROCHLOROTHIAZIDE 25; 20 MG/1; MG/1
TABLET ORAL
Qty: 90 TAB | Refills: 0 | Status: SHIPPED | OUTPATIENT
Start: 2020-07-22 | End: 2020-10-19

## 2020-07-22 RX ORDER — GLIPIZIDE 5 MG/1
TABLET ORAL
Qty: 180 TAB | Refills: 0 | Status: SHIPPED | OUTPATIENT
Start: 2020-07-22 | End: 2020-10-19

## 2020-07-23 NOTE — TELEPHONE ENCOUNTER
Last seen by PCP 7/16/2020.     Lab Results   Component Value Date/Time    HBA1C 9.7 (A) 06/15/2020 11:27 AM      Lab Results   Component Value Date/Time    MALBCRT 6 06/21/2019 09:39 AM    MICROALBUR 0.9 06/21/2019 09:39 AM      Lab Results   Component Value Date/Time    ALKPHOSPHAT 60 06/21/2019 09:39 AM    ASTSGOT 17 06/21/2019 09:39 AM    ALTSGPT 24 06/21/2019 09:39 AM    TBILIRUBIN 0.6 06/21/2019 09:39 AM      Last Blood Pressure reading was 132/70 on 7/16/2020      Has appt.  Will send 3 month(s) to the pharmacy.

## 2020-09-26 ENCOUNTER — HOSPITAL ENCOUNTER (OUTPATIENT)
Facility: MEDICAL CENTER | Age: 60
End: 2020-09-26
Attending: PHYSICIAN ASSISTANT
Payer: COMMERCIAL

## 2020-09-26 PROCEDURE — 82274 ASSAY TEST FOR BLOOD FECAL: CPT

## 2020-10-05 DIAGNOSIS — Z12.12 SCREENING FOR COLORECTAL CANCER: ICD-10-CM

## 2020-10-05 DIAGNOSIS — Z12.11 SCREENING FOR COLORECTAL CANCER: ICD-10-CM

## 2020-10-06 LAB — HEMOCCULT STL QL IA: NEGATIVE

## 2020-12-29 ENCOUNTER — HOSPITAL ENCOUNTER (OUTPATIENT)
Dept: LAB | Facility: MEDICAL CENTER | Age: 60
End: 2020-12-29
Attending: PHYSICIAN ASSISTANT
Payer: COMMERCIAL

## 2020-12-29 ENCOUNTER — OFFICE VISIT (OUTPATIENT)
Dept: MEDICAL GROUP | Facility: PHYSICIAN GROUP | Age: 60
End: 2020-12-29
Payer: COMMERCIAL

## 2020-12-29 VITALS
TEMPERATURE: 98 F | HEIGHT: 61 IN | WEIGHT: 144.3 LBS | HEART RATE: 89 BPM | DIASTOLIC BLOOD PRESSURE: 52 MMHG | OXYGEN SATURATION: 97 % | SYSTOLIC BLOOD PRESSURE: 106 MMHG | BODY MASS INDEX: 27.24 KG/M2

## 2020-12-29 DIAGNOSIS — E11.65 TYPE 2 DIABETES MELLITUS WITH HYPERGLYCEMIA, WITHOUT LONG-TERM CURRENT USE OF INSULIN (HCC): ICD-10-CM

## 2020-12-29 DIAGNOSIS — Z23 NEED FOR VACCINATION: ICD-10-CM

## 2020-12-29 DIAGNOSIS — E78.5 DYSLIPIDEMIA ASSOCIATED WITH TYPE 2 DIABETES MELLITUS (HCC): ICD-10-CM

## 2020-12-29 DIAGNOSIS — I15.2 HYPERTENSION ASSOCIATED WITH DIABETES (HCC): ICD-10-CM

## 2020-12-29 DIAGNOSIS — E11.59 HYPERTENSION ASSOCIATED WITH DIABETES (HCC): ICD-10-CM

## 2020-12-29 DIAGNOSIS — E11.69 DYSLIPIDEMIA ASSOCIATED WITH TYPE 2 DIABETES MELLITUS (HCC): ICD-10-CM

## 2020-12-29 LAB
ALBUMIN SERPL BCP-MCNC: 4.8 G/DL (ref 3.2–4.9)
ALBUMIN/GLOB SERPL: 2 G/DL
ALP SERPL-CCNC: 45 U/L (ref 30–99)
ALT SERPL-CCNC: 28 U/L (ref 2–50)
ANION GAP SERPL CALC-SCNC: 13 MMOL/L (ref 7–16)
AST SERPL-CCNC: 22 U/L (ref 12–45)
BILIRUB SERPL-MCNC: 1.3 MG/DL (ref 0.1–1.5)
BUN SERPL-MCNC: 18 MG/DL (ref 8–22)
CALCIUM SERPL-MCNC: 9.7 MG/DL (ref 8.5–10.5)
CHLORIDE SERPL-SCNC: 97 MMOL/L (ref 96–112)
CO2 SERPL-SCNC: 28 MMOL/L (ref 20–33)
CREAT SERPL-MCNC: 0.84 MG/DL (ref 0.5–1.4)
CREAT UR-MCNC: 62.49 MG/DL
EST. AVERAGE GLUCOSE BLD GHB EST-MCNC: 157 MG/DL
GLOBULIN SER CALC-MCNC: 2.4 G/DL (ref 1.9–3.5)
GLUCOSE SERPL-MCNC: 117 MG/DL (ref 65–99)
HBA1C MFR BLD: 7.1 % (ref 0–5.6)
MICROALBUMIN UR-MCNC: <1.2 MG/DL
MICROALBUMIN/CREAT UR: NORMAL MG/G (ref 0–30)
POTASSIUM SERPL-SCNC: 3.5 MMOL/L (ref 3.6–5.5)
PROT SERPL-MCNC: 7.2 G/DL (ref 6–8.2)
SODIUM SERPL-SCNC: 138 MMOL/L (ref 135–145)

## 2020-12-29 PROCEDURE — 90686 IIV4 VACC NO PRSV 0.5 ML IM: CPT | Performed by: INTERNAL MEDICINE

## 2020-12-29 PROCEDURE — 82043 UR ALBUMIN QUANTITATIVE: CPT

## 2020-12-29 PROCEDURE — 99204 OFFICE O/P NEW MOD 45 MIN: CPT | Mod: 25 | Performed by: INTERNAL MEDICINE

## 2020-12-29 PROCEDURE — 82570 ASSAY OF URINE CREATININE: CPT

## 2020-12-29 PROCEDURE — 80053 COMPREHEN METABOLIC PANEL: CPT

## 2020-12-29 PROCEDURE — 90471 IMMUNIZATION ADMIN: CPT | Performed by: INTERNAL MEDICINE

## 2020-12-29 PROCEDURE — 36415 COLL VENOUS BLD VENIPUNCTURE: CPT

## 2020-12-29 PROCEDURE — 83036 HEMOGLOBIN GLYCOSYLATED A1C: CPT

## 2020-12-29 NOTE — PROGRESS NOTES
New Patient to establish    Chief Complaint   Patient presents with   • Establish Care       Subjective:     History of Present Illness: Patient is a 60 y.o. female who is here today to establish primary care    2. Hypertension associated with diabetes (HCC)  Chronic, stable, mention she is taking lisinopril-glucose is a 20-25 milligrams daily, denies related symptoms    3. Type 2 diabetes mellitus with hyperglycemia, without long-term current use of insulin ([facility])  A1c:   Lab Results   Component Value Date/Time    HBA1C 9.7 (A) 06/15/2020 1127    HBA1C 7.9 (A) 02/06/2020 1120    HBA1C 7.9 (A) 06/27/2019 1749    AVGLUC 174 11/01/2018 0845    AVGLUC 163 06/06/2018 1028    AVGLUC 197 05/02/2017 1058   Today she is having some blood work including A1c as well as urine microalbumin, mention she is checking blood glucose before breakfast with range from 120s to 150s, mention she is compliant with Farxiga 10 mg daily, Trulicity 1.5 mg weekly, glipizide 5 mg daily, Metformin 1000 twice daily  -Patient try to eat healthier options as well as less sugar  -Mention she was seen by ophthalmologist September thousand 20 and her eye is fine, also denied having skin ulceration tingling numbness of the feet and diabetic monofilament performed by previous PCP with normal findings      Current Outpatient Medications on File Prior to Visit   Medication Sig Dispense Refill   • MAGNESIUM PO Take  by mouth.     • glipiZIDE (GLUCOTROL) 5 MG Tab Take 1 tablet by mouth twice daily 180 Tab 0   • lisinopril-hydrochlorothiazide (PRINZIDE) 20-25 MG per tablet Take 1 tablet by mouth once daily 90 Tab 0   • metformin (GLUCOPHAGE) 1000 MG tablet TAKE 1 TABLET BY MOUTH TWICE DAILY WITH MEALS 180 Tab 0   • Dapagliflozin Propanediol (FARXIGA) 10 MG Tab Take 10 mg by mouth every day. 90 Tab 1   • Nutritional Supplements (ESTROVEN PO) Take  by mouth.     • Dulaglutide (TRULICITY) 1.5 MG/0.5ML Solution Pen-injector Inject 1.5 mg as instructed every 7  days. 4 PEN 11   • atorvastatin (LIPITOR) 20 MG Tab Take 1 tablet by mouth once daily 90 Tab 3   • Vitamin D, Cholecalciferol, 1000 UNITS Cap Take  by mouth.     • Cetirizine HCl (ZYRTEC ALLERGY PO) Take  by mouth.     • Fish Oil Oil by Does not apply route.     • POTASSIUM PO Take  by mouth.     • Acetaminophen (TYLENOL PO) Take  by mouth.       No current facility-administered medications on file prior to visit.      Allergies   Allergen Reactions   • Januvia [Sitagliptin] Unspecified     Headaches, nausea, vomiting   • Jardiance [Empagliflozin]      Constipation, rash, palpitations, dizziness   • Sulfa Drugs    • Tape      Patient Active Problem List    Diagnosis Date Noted   • Vitamin D insufficiency 10/13/2016   • Dyslipidemia associated with type 2 diabetes mellitus (HCC) 02/10/2016   • Type 2 diabetes mellitus with hyperglycemia, without long-term current use of insulin ([facility]) 06/03/2013   • Hypertension associated with diabetes ([facility]) 06/03/2013     Past Medical History:   Diagnosis Date   • Allergy     otc Allertec   • Arrhythmia    • Arthritis     OA   • Diabetes    • Diabetic neuropathy (HCC)    • Dyslipidemia associated with type 2 diabetes mellitus ([facility]) 2/10/2016   • Heart murmur    • Hypertension      Past Surgical History:   Procedure Laterality Date   • CATARACT PHACO WITH IOL Left 4/26/2016    Procedure: CATARACT PHACO WITH IOL;  Surgeon: Yogesh Lennon M.D.;  Location: [facility];  Service:    • CATARACT PHACO WITH IOL Right 4/12/2016    Procedure: CATARACT PHACO WITH IOL;  Surgeon: Yogesh Lennon M.D.;  Location: [facility];  Service:    • CALCANEUS ORIF     • CARPAL TUNNEL ENDOSCOPIC     • DENTAL EXTRACTION(S)     • PRIMARY C SECTION     • TUBAL COAGULATION LAPAROSCOPIC BILATERAL       Family History   Problem Relation Age of Onset   • Alcohol/Drug Mother    • Arthritis Mother    • Hyperlipidemia Mother    • Heart Disease Mother    • Hypertension Mother    •  "Diabetes Mother    • Lung Disease Father         mesothemoloma, cancer   • Cancer Father    • Hyperlipidemia Father    • Hypertension Father    • Heart Disease Father    • Diabetes Father    • Psychiatric Illness Sister         Bipolar   • Heart Disease Maternal Grandmother    • Hypertension Maternal Grandmother    • Hyperlipidemia Maternal Grandmother    • Genetic Disorder Maternal Grandfather         Parkinsons   • Heart Disease Paternal Grandmother    • Hypertension Paternal Grandmother    • Hyperlipidemia Paternal Grandmother    • Heart Disease Paternal Grandfather    • Hypertension Paternal Grandfather    • Hyperlipidemia Paternal Grandfather      Social History     Tobacco Use   • Smoking status: Never Smoker   • Smokeless tobacco: Never Used   Substance Use Topics   • Alcohol use: No     Alcohol/week: 0.0 oz     Comment: Rarely   • Drug use: No     ROS:     - Constitutional: Negative for fever, chills,    - Eye: Negative for blurry vision    -ENT: Negative for ear pain    - Respiratory: Negative for cough, hemoptysis    - Cardiovascular: Negative for chest pain     - Gastrointestinal: Negative for abdominal pain    - Genitourinary: Negative for dysuria    - Musculoskeletal: Negative for joint swelling    - Skin: Negative for itching    - Neurological: Negative for focal weakness     - Psychiatric/Behavioral: Negative for depression        Physical Exam:     /52 (BP Location: Left arm, Patient Position: Sitting, BP Cuff Size: Adult)   Pulse 89   Temp 36.7 °C (98 °F) (Temporal)   Ht 1.549 m (5' 1\")   Wt 65.5 kg (144 lb 4.8 oz)   LMP 02/01/2016 Comment: tubal ligation 2002  SpO2 97%   BMI 27.27 kg/m²   General: Normal appearing. No distress.  ENT: oropharynx without exudates.    Eyes: conjunctiva clear lids without ptosis  Pulmonary: Clear to ausculation.  Normal effort.   Cardiovascular: Regular rate and rhythm  Abdomen: Soft, nontender,  Lymph: No cervical or supraclavicular palpable lymph " nodes  Psych: Normal mood and affect.     I have reviewed pertinent labs and diagnostic tests associated with this visit with specific comments listed under the assessment and plan below      Assessment and Plan:     1. Need for vaccination  - Influenza Vaccine Quad Injection (PF)    2. Hypertension associated with diabetes (HCC)  Continue blood pressure medication as above    3. Type 2 diabetes mellitus with hyperglycemia, without long-term current use of insulin (HCC)  Continue diabetic medication as above  -Also she has some stress from taking care of her mother who has heart failure and alcoholism issues  >> Follow-up next visit to discuss in detail about diabetes and healthy lifestyle  -Her A1c and other labs are pending  4. Dyslipidemia associated with type 2 diabetes mellitus (HCC)  Continue statin        Follow Up:      Return in about 4 weeks (around 1/26/2021) for follow up.   Diabetes, hypertension, lifestyle  Start    Please note that this dictation was created using voice recognition software. I have made every reasonable attempt to correct obvious errors, but I expect that there are errors of grammar and possibly content that I did not discover before finalizing the note.    Signed by: Pat Stewart M.D.

## 2021-01-06 DIAGNOSIS — E11.65 TYPE 2 DIABETES MELLITUS WITH HYPERGLYCEMIA, WITHOUT LONG-TERM CURRENT USE OF INSULIN (HCC): ICD-10-CM

## 2021-01-06 DIAGNOSIS — I15.2 HYPERTENSION ASSOCIATED WITH DIABETES (HCC): ICD-10-CM

## 2021-01-06 DIAGNOSIS — E11.59 HYPERTENSION ASSOCIATED WITH DIABETES (HCC): ICD-10-CM

## 2021-01-07 RX ORDER — DAPAGLIFLOZIN 10 MG/1
TABLET, FILM COATED ORAL
Qty: 90 TAB | Refills: 0 | Status: SHIPPED | OUTPATIENT
Start: 2021-01-07 | End: 2021-04-12 | Stop reason: SDUPTHER

## 2021-01-07 RX ORDER — GLIPIZIDE 5 MG/1
5 TABLET ORAL 2 TIMES DAILY
Qty: 180 TAB | Refills: 0 | Status: SHIPPED | OUTPATIENT
Start: 2021-01-07 | End: 2021-04-12 | Stop reason: SDUPTHER

## 2021-01-07 RX ORDER — LISINOPRIL AND HYDROCHLOROTHIAZIDE 25; 20 MG/1; MG/1
1 TABLET ORAL DAILY
Qty: 90 TAB | Refills: 0 | Status: SHIPPED | OUTPATIENT
Start: 2021-01-07 | End: 2021-04-12 | Stop reason: SDUPTHER

## 2021-02-10 ENCOUNTER — OFFICE VISIT (OUTPATIENT)
Dept: MEDICAL GROUP | Facility: PHYSICIAN GROUP | Age: 61
End: 2021-02-10
Payer: COMMERCIAL

## 2021-02-10 VITALS
OXYGEN SATURATION: 98 % | HEIGHT: 61 IN | WEIGHT: 143.7 LBS | TEMPERATURE: 96.5 F | DIASTOLIC BLOOD PRESSURE: 60 MMHG | SYSTOLIC BLOOD PRESSURE: 110 MMHG | HEART RATE: 76 BPM | BODY MASS INDEX: 27.13 KG/M2

## 2021-02-10 DIAGNOSIS — E11.69 DYSLIPIDEMIA ASSOCIATED WITH TYPE 2 DIABETES MELLITUS (HCC): ICD-10-CM

## 2021-02-10 DIAGNOSIS — E11.65 TYPE 2 DIABETES MELLITUS WITH HYPERGLYCEMIA, WITHOUT LONG-TERM CURRENT USE OF INSULIN (HCC): ICD-10-CM

## 2021-02-10 DIAGNOSIS — E66.3 OVERWEIGHT (BMI 25.0-29.9): ICD-10-CM

## 2021-02-10 DIAGNOSIS — E78.5 DYSLIPIDEMIA ASSOCIATED WITH TYPE 2 DIABETES MELLITUS (HCC): ICD-10-CM

## 2021-02-10 DIAGNOSIS — I15.2 HYPERTENSION ASSOCIATED WITH DIABETES (HCC): ICD-10-CM

## 2021-02-10 DIAGNOSIS — E55.9 VITAMIN D INSUFFICIENCY: ICD-10-CM

## 2021-02-10 DIAGNOSIS — E11.59 HYPERTENSION ASSOCIATED WITH DIABETES (HCC): ICD-10-CM

## 2021-02-10 PROCEDURE — 99214 OFFICE O/P EST MOD 30 MIN: CPT | Performed by: INTERNAL MEDICINE

## 2021-02-10 ASSESSMENT — PATIENT HEALTH QUESTIONNAIRE - PHQ9: CLINICAL INTERPRETATION OF PHQ2 SCORE: 0

## 2021-02-10 NOTE — PROGRESS NOTES
Established Patient    Chief Complaint   Patient presents with   • Follow-Up       Subjective:     HPI:   Tammy presents today with the following.      2. Type 2 diabetes mellitus with hyperglycemia, without long-term current use of insulin ([facility])  A1c:   Lab Results   Component Value Date/Time    HBA1C 7.1 (H) 12/29/2020 0913    HBA1C 9.7 (A) 06/15/2020 1127    HBA1C 7.9 (A) 02/06/2020 1120    AVGLUC 157 12/29/2020 0913    AVGLUC 174 11/01/2018 0845    AVGLUC 163 06/06/2018 1028   >> A1c is improving, patient try to improve healthy lifestyle, eating healthier option, avoiding sugar, eating less carbs, more regular exercise, also mention losing weight  -She is currently taking glipizide 5 mg twice daily, Farxiga 10 mg daily, Trulicity 1.5 mg weekly, denied having any symptoms  -Patient check blood glucose before breakfast with better control of low 100s    Patient Active Problem List    Diagnosis Date Noted   • Vitamin D insufficiency 10/13/2016   • Dyslipidemia associated with type 2 diabetes mellitus ([facility]) 02/10/2016   • Type 2 diabetes mellitus with hyperglycemia, without long-term current use of insulin ([facility]) 06/03/2013   • Hypertension associated with diabetes ([facility]) 06/03/2013       Current Outpatient Medications on File Prior to Visit   Medication Sig Dispense Refill   • lisinopril-hydrochlorothiazide (PRINZIDE) 20-25 MG per tablet Take 1 Tab by mouth every day. 90 Tab 0   • metformin (GLUCOPHAGE) 1000 MG tablet Take 1 Tab by mouth 2 times a day, with meals. 180 Tab 0   • glipiZIDE (GLUCOTROL) 5 MG Tab Take 1 Tab by mouth 2 times a day. 180 Tab 0   • FARXIGA 10 MG Tab Take 1 tablet by mouth once daily 90 Tab 0   • MAGNESIUM PO Take  by mouth.     • Nutritional Supplements (ESTROVEN PO) Take  by mouth.     • Dulaglutide (TRULICITY) 1.5 MG/0.5ML Solution Pen-injector Inject 1.5 mg as instructed every 7 days. 4 PEN 11   • atorvastatin (LIPITOR) 20 MG Tab Take 1 tablet by mouth once daily 90 Tab 3   • Vitamin D,  "Cholecalciferol, 1000 UNITS Cap Take  by mouth.     • Cetirizine HCl (ZYRTEC ALLERGY PO) Take  by mouth.     • Fish Oil Oil by Does not apply route.     • POTASSIUM PO Take  by mouth.     • Acetaminophen (TYLENOL PO) Take  by mouth.       No current facility-administered medications on file prior to visit.       Allergies, past medical history, past surgical history, family history, social history reviewed and updated    ROS:  All other systems reviewed and are negative except as stated in the HPI       Physical Exam:     /60 (BP Location: Left arm, Patient Position: Sitting, BP Cuff Size: Adult)   Pulse 76   Temp 35.8 °C (96.5 °F) (Temporal)   Ht 1.549 m (5' 0.98\")   Wt 65.2 kg (143 lb 11.2 oz)   LMP 02/01/2016 Comment: tubal ligation 2002  SpO2 98%   BMI 27.17 kg/m²   General: Normal appearing. No distress.  ENT: oropharynx without exudates.    Eyes: conjunctiva clear lids without ptosis  Pulmonary: Clear to ausculation.  Normal effort.   Cardiovascular: Regular rate and rhythm  Abdomen: Soft, nontender,  Lymph: No cervical or supraclavicular palpable lymph nodes  Psych: Normal mood and affect.     I have reviewed pertinent labs and diagnostic tests associated with this visit with specific comments listed under the assessment and plan below      Assessment and Plan:     60 y.o. female with the following issues.    2. Type 2 diabetes mellitus with hyperglycemia, without long-term current use of insulin (HCC)  Overweight  -Continue diabetic medications as above, could not take Jardiance secondary to side effect including palpitation, dizziness, constipation, skin rash according to the patient    -Discussed diabetic diet in detail, educated regarding management of hypoglycemia, advised regular exercise, educated disease management and weight goals as well as feet care and frequent check of feet.  -Patient to check early morning fasting blood glucose with goal discussed.  -Discussed side effects of " medication. Patient confirmed understanding of information.    -Lengthy discussion regarding healthy dietary options including plenty of vegetable, reduce carb and sugar, regular exercise as tolerated, healthy fat/protein  - Shown multiple pictures and figures in detail regarding healthy lifestyle changes and healthy dietary options  -Patient would like to start these lifestyle changes to improve obesity/overweight as well as other chronic conditions    Follow Up:      Return in about 2 months (around 4/10/2021) for follow up.  Diabetes, overweight, lifestyle**    Please note that this dictation was created using voice recognition software. I have made every reasonable attempt to correct obvious errors, but I expect that there are errors of grammar and possibly content that I did not discover before finalizing the note.    Signed by: Pat Stewart M.D.

## 2021-03-15 DIAGNOSIS — Z23 NEED FOR VACCINATION: ICD-10-CM

## 2021-04-12 ENCOUNTER — OFFICE VISIT (OUTPATIENT)
Dept: MEDICAL GROUP | Facility: PHYSICIAN GROUP | Age: 61
End: 2021-04-12
Payer: COMMERCIAL

## 2021-04-12 VITALS
OXYGEN SATURATION: 98 % | WEIGHT: 145.5 LBS | TEMPERATURE: 98.7 F | DIASTOLIC BLOOD PRESSURE: 58 MMHG | BODY MASS INDEX: 27.47 KG/M2 | SYSTOLIC BLOOD PRESSURE: 116 MMHG | HEIGHT: 61 IN | HEART RATE: 82 BPM

## 2021-04-12 DIAGNOSIS — E11.59 HYPERTENSION ASSOCIATED WITH DIABETES (HCC): ICD-10-CM

## 2021-04-12 DIAGNOSIS — E11.65 TYPE 2 DIABETES MELLITUS WITH HYPERGLYCEMIA, WITHOUT LONG-TERM CURRENT USE OF INSULIN (HCC): ICD-10-CM

## 2021-04-12 DIAGNOSIS — E78.5 DYSLIPIDEMIA ASSOCIATED WITH TYPE 2 DIABETES MELLITUS (HCC): ICD-10-CM

## 2021-04-12 DIAGNOSIS — E11.69 DYSLIPIDEMIA ASSOCIATED WITH TYPE 2 DIABETES MELLITUS (HCC): ICD-10-CM

## 2021-04-12 DIAGNOSIS — Z12.31 ENCOUNTER FOR SCREENING MAMMOGRAM FOR BREAST CANCER: ICD-10-CM

## 2021-04-12 DIAGNOSIS — I15.2 HYPERTENSION ASSOCIATED WITH DIABETES (HCC): ICD-10-CM

## 2021-04-12 PROCEDURE — 99214 OFFICE O/P EST MOD 30 MIN: CPT | Performed by: INTERNAL MEDICINE

## 2021-04-12 RX ORDER — GLIPIZIDE 5 MG/1
5 TABLET ORAL 2 TIMES DAILY
Qty: 180 TABLET | Refills: 3 | Status: SHIPPED | OUTPATIENT
Start: 2021-04-12 | End: 2022-02-01 | Stop reason: SDUPTHER

## 2021-04-12 RX ORDER — ATORVASTATIN CALCIUM 20 MG/1
TABLET, FILM COATED ORAL
Qty: 90 TABLET | Refills: 3 | Status: SHIPPED | OUTPATIENT
Start: 2021-04-12 | End: 2022-02-01 | Stop reason: SDUPTHER

## 2021-04-12 RX ORDER — DULAGLUTIDE 1.5 MG/.5ML
1 INJECTION, SOLUTION SUBCUTANEOUS
Qty: 4 EACH | Refills: 11 | Status: SHIPPED | OUTPATIENT
Start: 2021-04-12 | End: 2021-07-14

## 2021-04-12 RX ORDER — LISINOPRIL AND HYDROCHLOROTHIAZIDE 25; 20 MG/1; MG/1
1 TABLET ORAL DAILY
Qty: 90 TABLET | Refills: 3 | Status: SHIPPED | OUTPATIENT
Start: 2021-04-12 | End: 2022-02-01 | Stop reason: SDUPTHER

## 2021-04-12 RX ORDER — DAPAGLIFLOZIN 10 MG/1
TABLET, FILM COATED ORAL
Qty: 90 TABLET | Refills: 3 | Status: SHIPPED | OUTPATIENT
Start: 2021-04-12 | End: 2022-02-01 | Stop reason: SDUPTHER

## 2021-04-12 NOTE — PROGRESS NOTES
"Established Patient    Chief Complaint   Patient presents with   • Follow-Up       Subjective:     HPI:   Tammy presents today with the following.    2. Type 2 diabetes mellitus with hyperglycemia, without long-term current use of insulin ([facility])  Patient patient check blood sugar before breakfast with a range of 1 10-1 20, patient compliant with 1000 mg twice daily Metformin, Trulicity 1.5 mg weekly, Farxiga 10 mg daily, glipizide 5 mg daily, denied having related symptoms, reported compliant with healthy lifestyle, no added sugar sugar and low-carb diets      Patient Active Problem List    Diagnosis Date Noted   • Overweight (BMI 25.0-29.9) 02/10/2021   • Vitamin D insufficiency 10/13/2016   • Dyslipidemia associated with type 2 diabetes mellitus ([facility]) 02/10/2016   • Type 2 diabetes mellitus with hyperglycemia, without long-term current use of insulin ([facility]) 06/03/2013   • Hypertension associated with diabetes ([facility]) 06/03/2013       Current Outpatient Medications on File Prior to Visit   Medication Sig Dispense Refill   • MAGNESIUM PO Take  by mouth.     • Nutritional Supplements (ESTROVEN PO) Take  by mouth.     • Vitamin D, Cholecalciferol, 1000 UNITS Cap Take  by mouth.     • Cetirizine HCl (ZYRTEC ALLERGY PO) Take  by mouth.     • Fish Oil Oil by Does not apply route.     • POTASSIUM PO Take  by mouth.     • Acetaminophen (TYLENOL PO) Take  by mouth.       No current facility-administered medications on file prior to visit.       Allergies, past medical history, past surgical history, family history, social history reviewed and updated    ROS:  All other systems reviewed and are negative except as stated in the HPI     Physical Exam:     /58 (BP Location: Left arm, Patient Position: Sitting, BP Cuff Size: Adult)   Pulse 82   Temp 37.1 °C (98.7 °F) (Temporal)   Ht 1.549 m (5' 0.98\")   Wt 66 kg (145 lb 8 oz)   LMP 02/01/2016 Comment: tubal ligation 2002  SpO2 98%   BMI 27.51 kg/m²   General: Normal " appearing. No distress.  ENT: oropharynx without exudates.    Eyes: conjunctiva clear lids without ptosis  Pulmonary: Clear to ausculation.  Normal effort.   Cardiovascular: Regular rate and rhythm  Abdomen: Soft, nontender,  Lymph: No cervical or supraclavicular palpable lymph nodes  Psych: Normal mood and affect.     I have reviewed pertinent labs and diagnostic tests associated with this visit with specific comments listed under the assessment and plan below      Assessment and Plan:     60 y.o. female with the following issues.      2. Type 2 diabetes mellitus with hyperglycemia, without long-term current use of insulin (HCC)  - Dapagliflozin Propanediol (FARXIGA) 10 MG Tab; Take 1 tablet by mouth once daily  Dispense: 90 tablet; Refill: 3  - Dulaglutide (TRULICITY) 1.5 MG/0.5ML Solution Pen-injector; Inject 1 Each under the skin every 7 days.  Dispense: 4 Each; Refill: 11  - HEMOGLOBIN A1C; Future  - MAGNESIUM; Future  - VITAMIN D,25 HYDROXY; Future  - VITAMIN B12; Future  - TSH WITH REFLEX TO FT4; Future  >> Continue diabetic medication as above  -Discussed diabetic diet in detail, educated regarding management of hypoglycemia, advised regular exercise, educated disease management and weight goals as well as feet care and frequent check of feet.  -Patient to check early morning fasting blood glucose with goal discussed.  -Discussed side effects of medication. Patient confirmed understanding of information.    Follow Up:      Return in about 3 months (around 7/12/2021) for follow up.  Diabetes, labs,  Please note that this dictation was created using voice recognition software. I have made every reasonable attempt to correct obvious errors, but I expect that there are errors of grammar and possibly content that I did not discover before finalizing the note.    Signed by: Pat Stewart M.D.

## 2021-04-13 RX ORDER — DAPAGLIFLOZIN 10 MG/1
TABLET, FILM COATED ORAL
Qty: 90 TABLET | Refills: 0 | OUTPATIENT
Start: 2021-04-13

## 2021-07-13 ENCOUNTER — HOSPITAL ENCOUNTER (OUTPATIENT)
Dept: LAB | Facility: MEDICAL CENTER | Age: 61
End: 2021-07-13
Attending: INTERNAL MEDICINE
Payer: COMMERCIAL

## 2021-07-13 DIAGNOSIS — E11.65 TYPE 2 DIABETES MELLITUS WITH HYPERGLYCEMIA, WITHOUT LONG-TERM CURRENT USE OF INSULIN (HCC): ICD-10-CM

## 2021-07-13 DIAGNOSIS — E11.69 DYSLIPIDEMIA ASSOCIATED WITH TYPE 2 DIABETES MELLITUS (HCC): ICD-10-CM

## 2021-07-13 DIAGNOSIS — E78.5 DYSLIPIDEMIA ASSOCIATED WITH TYPE 2 DIABETES MELLITUS (HCC): ICD-10-CM

## 2021-07-13 LAB
25(OH)D3 SERPL-MCNC: 58 NG/ML (ref 30–100)
CHOLEST SERPL-MCNC: 121 MG/DL (ref 100–199)
EST. AVERAGE GLUCOSE BLD GHB EST-MCNC: 169 MG/DL
FASTING STATUS PATIENT QL REPORTED: NORMAL
HBA1C MFR BLD: 7.5 % (ref 4–5.6)
HDLC SERPL-MCNC: 46 MG/DL
LDLC SERPL CALC-MCNC: 52 MG/DL
MAGNESIUM SERPL-MCNC: 1.8 MG/DL (ref 1.5–2.5)
TRIGL SERPL-MCNC: 117 MG/DL (ref 0–149)
TSH SERPL DL<=0.005 MIU/L-ACNC: 2.36 UIU/ML (ref 0.38–5.33)
VIT B12 SERPL-MCNC: <150 PG/ML (ref 211–911)

## 2021-07-13 PROCEDURE — 82306 VITAMIN D 25 HYDROXY: CPT

## 2021-07-13 PROCEDURE — 83735 ASSAY OF MAGNESIUM: CPT

## 2021-07-13 PROCEDURE — 83036 HEMOGLOBIN GLYCOSYLATED A1C: CPT

## 2021-07-13 PROCEDURE — 36415 COLL VENOUS BLD VENIPUNCTURE: CPT

## 2021-07-13 PROCEDURE — 82607 VITAMIN B-12: CPT

## 2021-07-13 PROCEDURE — 80061 LIPID PANEL: CPT

## 2021-07-13 PROCEDURE — 84443 ASSAY THYROID STIM HORMONE: CPT

## 2021-07-14 ENCOUNTER — OFFICE VISIT (OUTPATIENT)
Dept: MEDICAL GROUP | Facility: PHYSICIAN GROUP | Age: 61
End: 2021-07-14
Payer: COMMERCIAL

## 2021-07-14 VITALS
WEIGHT: 146.4 LBS | HEIGHT: 61 IN | HEART RATE: 94 BPM | TEMPERATURE: 97.5 F | OXYGEN SATURATION: 94 % | SYSTOLIC BLOOD PRESSURE: 112 MMHG | DIASTOLIC BLOOD PRESSURE: 54 MMHG | BODY MASS INDEX: 27.64 KG/M2

## 2021-07-14 DIAGNOSIS — Z12.31 ENCOUNTER FOR SCREENING MAMMOGRAM FOR MALIGNANT NEOPLASM OF BREAST: ICD-10-CM

## 2021-07-14 DIAGNOSIS — E66.3 OVERWEIGHT (BMI 25.0-29.9): ICD-10-CM

## 2021-07-14 DIAGNOSIS — E11.65 TYPE 2 DIABETES MELLITUS WITH HYPERGLYCEMIA, WITHOUT LONG-TERM CURRENT USE OF INSULIN (HCC): ICD-10-CM

## 2021-07-14 PROCEDURE — 99214 OFFICE O/P EST MOD 30 MIN: CPT | Performed by: INTERNAL MEDICINE

## 2021-07-14 RX ORDER — DULAGLUTIDE 3 MG/.5ML
1 INJECTION, SOLUTION SUBCUTANEOUS
Qty: 2 ML | Refills: 6 | Status: SHIPPED | OUTPATIENT
Start: 2021-07-14 | End: 2022-02-01 | Stop reason: SDUPTHER

## 2021-07-14 NOTE — PROGRESS NOTES
Established Patient    Chief Complaint   Patient presents with   • Follow-Up       Subjective:     HPI:   Tammy presents today with the following.    1. 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 and sometimes from day shift to night shift based on their schedule,  -Reported compliance with Trulicity 1.5 mg daily, Farxiga 10 mg daily, glipizide 5 mg daily, Metformin 1000 daily, patient check blood sugar before breakfast with normal range, also reported seeing ophthalmology, denied any numbness of the feet    Patient Active Problem List    Diagnosis Date Noted   • Overweight (BMI 25.0-29.9) 02/10/2021   • Vitamin D insufficiency 10/13/2016   • Dyslipidemia associated with type 2 diabetes mellitus ([facility]) 02/10/2016   • Type 2 diabetes mellitus with hyperglycemia, without long-term current use of insulin ([facility]) 06/03/2013   • Hypertension associated with diabetes ([facility]) 06/03/2013       Current Outpatient Medications on File Prior to Visit   Medication Sig Dispense Refill   • lisinopril-hydrochlorothiazide (PRINZIDE) 20-25 MG per tablet Take 1 tablet by mouth every day. 90 tablet 3   • metformin (GLUCOPHAGE) 1000 MG tablet Take 1 tablet by mouth 2 times a day with meals. 180 tablet 3   • glipiZIDE (GLUCOTROL) 5 MG Tab Take 1 tablet by mouth 2 times a day. 180 tablet 3   • Dapagliflozin Propanediol (FARXIGA) 10 MG Tab Take 1 tablet by mouth once daily 90 tablet 3   • atorvastatin (LIPITOR) 20 MG Tab Take 1 tablet by mouth once daily 90 tablet 3   • MAGNESIUM PO Take  by mouth.     • Nutritional Supplements (ESTROVEN PO) Take  by mouth.     • Vitamin D, Cholecalciferol, 1000 UNITS Cap Take  by mouth.     • Cetirizine HCl (ZYRTEC ALLERGY PO) Take   "by mouth.     • Fish Oil Oil by Does not apply route.     • POTASSIUM PO Take  by mouth.     • Acetaminophen (TYLENOL PO) Take  by mouth.       No current facility-administered medications on file prior to visit.       Allergies, past medical history, past surgical history, family history, social history reviewed and updated    ROS:  All other systems reviewed and are negative except as stated in the HPI     Physical Exam:     /54 (BP Location: Right arm, Patient Position: Sitting, BP Cuff Size: Adult)   Pulse 94   Temp 36.4 °C (97.5 °F) (Temporal)   Ht 1.549 m (5' 0.98\")   Wt 66.4 kg (146 lb 6.4 oz)   LMP 02/01/2016 Comment: tubal ligation 2002  SpO2 94%   BMI 27.68 kg/m²   General: Normal appearing. No distress.  ENT: oropharynx without exudates.    Eyes: conjunctiva clear lids without ptosis  Pulmonary: Clear to ausculation.  Normal effort.   Cardiovascular: Regular rate and rhythm  Abdomen: Soft, nontender,  Lymph: No cervical or supraclavicular palpable lymph nodes  Psych: Normal mood and affect.     I have reviewed pertinent labs and diagnostic tests associated with this visit with specific comments listed under the assessment and plan below      Assessment and Plan:     61 y.o. female with the following issues.    1. Type 2 diabetes mellitus with hyperglycemia, without long-term current use of insulin (HCC)  Overweight  - Dulaglutide (TRULICITY) 3 MG/0.5ML Solution Pen-injector; Inject 1 Each under the skin every 7 days.  Dispense: 2 mL; Refill: 6>> increase from 1.5 mg weekly  -Okay to reduce glipizide from 5 mg bid to 2.5 mg twice daily, continue rest of medication  A1c:   Lab Results   Component Value Date/Time    HBA1C 7.5 (H) 07/13/2021 0856    HBA1C 7.1 (H) 12/29/2020 0913    HBA1C 9.7 (A) 06/15/2020 1127    AVGLUC 169 07/13/2021 0856    AVGLUC 157 12/29/2020 0913    AVGLUC 174 11/01/2018 0845       -Discussed diabetic diet in detail, educated regarding management of hypoglycemia, advised " regular exercise, educated disease management and weight goals as well as feet care and frequent check of feet.  -Patient to check early morning fasting blood glucose with goal discussed.  - asked to have a BG log with daily fasting and 2 hr postprandial after biggest meal and bring to next visit or send through my chart  -Discussed side effects of medication. Patient confirmed understanding of information.      2. Encounter for screening mammogram for malignant neoplasm of breast  - MA-SCREENING MAMMO BILAT W/CAD; Future    Follow Up:      Return in about 3 months (around 10/14/2021) for follow up.  Diabetes, A1c,    Please note that this dictation was created using voice recognition software. I have made every reasonable attempt to correct obvious errors, but I expect that there are errors of grammar and possibly content that I did not discover before finalizing the note.    Signed by: Pat Stewart M.D.

## 2021-07-14 NOTE — PATIENT INSTRUCTIONS
Natural vitamins from whole foods ( mainly from vegetables)   Active vitamin B complex supplement (methylcobalamin B12, methyl folate B9).   Magnesium glycinate supplement  400 mg daily  Vitamin D3 with K2 supplement   Essential oil supplement (cod liver oil)  Turmeric, roni, Boswellia, black pepper, Cinnamon combination supplement   CoQ10 100 mg daily     Take glipizide 2.5 mg twice daily

## 2021-10-20 ENCOUNTER — OFFICE VISIT (OUTPATIENT)
Dept: MEDICAL GROUP | Facility: PHYSICIAN GROUP | Age: 61
End: 2021-10-20
Payer: COMMERCIAL

## 2021-10-20 VITALS
BODY MASS INDEX: 26.73 KG/M2 | SYSTOLIC BLOOD PRESSURE: 108 MMHG | HEIGHT: 61 IN | WEIGHT: 141.6 LBS | HEART RATE: 70 BPM | DIASTOLIC BLOOD PRESSURE: 58 MMHG | TEMPERATURE: 97.1 F | OXYGEN SATURATION: 98 %

## 2021-10-20 DIAGNOSIS — I15.2 HYPERTENSION ASSOCIATED WITH DIABETES (HCC): ICD-10-CM

## 2021-10-20 DIAGNOSIS — E11.65 TYPE 2 DIABETES MELLITUS WITH HYPERGLYCEMIA, WITHOUT LONG-TERM CURRENT USE OF INSULIN (HCC): ICD-10-CM

## 2021-10-20 DIAGNOSIS — E11.59 HYPERTENSION ASSOCIATED WITH DIABETES (HCC): ICD-10-CM

## 2021-10-20 PROCEDURE — 99214 OFFICE O/P EST MOD 30 MIN: CPT | Performed by: INTERNAL MEDICINE

## 2021-10-20 NOTE — PROGRESS NOTES
Established Patient    Chief Complaint   Patient presents with   • Follow-Up       Subjective:     HPI:   Tammy presents today with the following.    1. Type 2 diabetes mellitus with hyperglycemia, without long-term current use of insulin ([facility])  Patient check blood sugar before breakfast with normal range from low 100-150, average 110s, patient denies related symptoms, she also follow-up with ophthalmology, as well as denies any pain numbness of the feet,  -Patient is compliant with Metformin 1000 twice daily, glipizide 5 mg daily, Trulicity 3 mg weekly, Farxiga 10 mg a day,    Also blood pressure within normal range, compliant with lisinopril-hydrochlorothiazide 20-25 mg daily,      Patient Active Problem List    Diagnosis Date Noted   • Overweight (BMI 25.0-29.9) 02/10/2021   • Vitamin D insufficiency 10/13/2016   • Dyslipidemia associated with type 2 diabetes mellitus ([facility]) 02/10/2016   • Type 2 diabetes mellitus with hyperglycemia, without long-term current use of insulin ([facility]) 06/03/2013   • Hypertension associated with diabetes ([facility]) 06/03/2013       Current Outpatient Medications on File Prior to Visit   Medication Sig Dispense Refill   • Dulaglutide (TRULICITY) 3 MG/0.5ML Solution Pen-injector Inject 1 Each under the skin every 7 days. 2 mL 6   • lisinopril-hydrochlorothiazide (PRINZIDE) 20-25 MG per tablet Take 1 tablet by mouth every day. 90 tablet 3   • metformin (GLUCOPHAGE) 1000 MG tablet Take 1 tablet by mouth 2 times a day with meals. 180 tablet 3   • glipiZIDE (GLUCOTROL) 5 MG Tab Take 1 tablet by mouth 2 times a day. 180 tablet 3   • Dapagliflozin Propanediol (FARXIGA) 10 MG Tab Take 1 tablet by mouth once daily 90 tablet 3   • atorvastatin (LIPITOR) 20 MG Tab Take 1 tablet by mouth once daily 90 tablet 3   • MAGNESIUM PO Take  by mouth.     • Nutritional Supplements (ESTROVEN PO) Take  by mouth.     • Vitamin D, Cholecalciferol, 1000 UNITS Cap Take  by mouth.     • Cetirizine HCl (ZYRTEC ALLERGY  "PO) Take  by mouth.     • Fish Oil Oil by Does not apply route.     • POTASSIUM PO Take  by mouth.     • Acetaminophen (TYLENOL PO) Take  by mouth.       No current facility-administered medications on file prior to visit.       Allergies, past medical history, past surgical history, family history, social history reviewed and updated    ROS:  All other systems reviewed and are negative except as stated in the HPI       Physical Exam:     /58 (BP Location: Left arm, Patient Position: Sitting, BP Cuff Size: Adult)   Pulse 70   Temp 36.2 °C (97.1 °F) (Temporal)   Ht 1.549 m (5' 0.98\")   Wt 64.2 kg (141 lb 9.6 oz)   LMP 02/01/2016 Comment: tubal ligation 2002  SpO2 98%   BMI 26.77 kg/m²   General: Normal appearing. No distress.  ENT: oropharynx without exudates.    Eyes: conjunctiva clear lids without ptosis  Pulmonary: Clear to ausculation.  Normal effort.   Cardiovascular: Regular rate and rhythm  Abdomen: Soft, nontender,  Lymph: No cervical or supraclavicular palpable lymph nodes  Psych: Normal mood and affect.     I have reviewed pertinent labs and diagnostic tests associated with this visit with specific comments listed under the assessment and plan below      Assessment and Plan:     61 y.o. female with the following issues.    1. Type 2 diabetes mellitus with hyperglycemia, without long-term current use of insulin (HCC)  Continue diabetic medications    -Discussed diabetic diet in detail, educated regarding management of hypoglycemia, advised regular exercise, educated disease management and weight goals as well as feet care and frequent check of feet.  -Patient to check early morning fasting blood glucose with goal discussed.  - asked to have a BG log with daily fasting and 2 hr postprandial after biggest meal and bring to next visit or send through my chart  -Discussed side effects of medication. Patient confirmed understanding of information.    2. Hypertension associated with diabetes " (HCC)  Continue blood pressure medication as above    -discussion in details on target blood pressure goal, advised monitoring BP closely at home.  Have BP log to present at follow-up visit or send through my chart.   -Advised low-salt diet, healthy dietary option include plenty of vegetable, reduce carb and sugar, regular exercise as tolerated, healthy fat/protein, also avoid alcohol, no NSAIDs  If symptoms worsen or persist patient can return to clinic for reevaluation.  Red flags and strict emergency room precautions discussed.  Discussed side effects of medication. Patient understand    Follow Up:      Return in about 3 months (around 1/20/2022).  Diabetes, A1c, labs  Please note that this dictation was created using voice recognition software. I have made every reasonable attempt to correct obvious errors, but I expect that there are errors of grammar and possibly content that I did not discover before finalizing the note.    Signed by: Pat Stewart M.D.

## 2022-02-01 ENCOUNTER — OFFICE VISIT (OUTPATIENT)
Dept: MEDICAL GROUP | Facility: PHYSICIAN GROUP | Age: 62
End: 2022-02-01
Payer: COMMERCIAL

## 2022-02-01 VITALS
DIASTOLIC BLOOD PRESSURE: 60 MMHG | WEIGHT: 142 LBS | OXYGEN SATURATION: 98 % | HEART RATE: 88 BPM | TEMPERATURE: 97.4 F | BODY MASS INDEX: 26.81 KG/M2 | HEIGHT: 61 IN | SYSTOLIC BLOOD PRESSURE: 108 MMHG

## 2022-02-01 DIAGNOSIS — G56.02 CARPAL TUNNEL SYNDROME OF LEFT WRIST: ICD-10-CM

## 2022-02-01 DIAGNOSIS — E11.65 TYPE 2 DIABETES MELLITUS WITH HYPERGLYCEMIA, WITHOUT LONG-TERM CURRENT USE OF INSULIN (HCC): ICD-10-CM

## 2022-02-01 DIAGNOSIS — E11.59 HYPERTENSION ASSOCIATED WITH DIABETES (HCC): ICD-10-CM

## 2022-02-01 DIAGNOSIS — E11.69 DYSLIPIDEMIA ASSOCIATED WITH TYPE 2 DIABETES MELLITUS (HCC): ICD-10-CM

## 2022-02-01 DIAGNOSIS — E78.5 DYSLIPIDEMIA ASSOCIATED WITH TYPE 2 DIABETES MELLITUS (HCC): ICD-10-CM

## 2022-02-01 DIAGNOSIS — I15.2 HYPERTENSION ASSOCIATED WITH DIABETES (HCC): ICD-10-CM

## 2022-02-01 LAB
HBA1C MFR BLD: 6.7 % (ref 0–5.6)
INT CON NEG: NEGATIVE
INT CON POS: POSITIVE

## 2022-02-01 PROCEDURE — 99214 OFFICE O/P EST MOD 30 MIN: CPT | Performed by: INTERNAL MEDICINE

## 2022-02-01 PROCEDURE — 83036 HEMOGLOBIN GLYCOSYLATED A1C: CPT | Performed by: INTERNAL MEDICINE

## 2022-02-01 RX ORDER — DULAGLUTIDE 3 MG/.5ML
1 INJECTION, SOLUTION SUBCUTANEOUS
Qty: 6 ML | Refills: 3 | Status: SHIPPED | OUTPATIENT
Start: 2022-02-01 | End: 2023-01-24

## 2022-02-01 RX ORDER — DAPAGLIFLOZIN 10 MG/1
TABLET, FILM COATED ORAL
Qty: 90 TABLET | Refills: 3 | Status: SHIPPED | OUTPATIENT
Start: 2022-02-01 | End: 2022-04-05

## 2022-02-01 RX ORDER — GLIPIZIDE 5 MG/1
5 TABLET ORAL 2 TIMES DAILY
Qty: 180 TABLET | Refills: 3 | Status: SHIPPED | OUTPATIENT
Start: 2022-02-01 | End: 2022-04-05

## 2022-02-01 RX ORDER — ATORVASTATIN CALCIUM 20 MG/1
TABLET, FILM COATED ORAL
Qty: 90 TABLET | Refills: 3 | Status: SHIPPED | OUTPATIENT
Start: 2022-02-01 | End: 2022-04-05

## 2022-02-01 RX ORDER — LISINOPRIL AND HYDROCHLOROTHIAZIDE 25; 20 MG/1; MG/1
1 TABLET ORAL DAILY
Qty: 90 TABLET | Refills: 3 | Status: SHIPPED | OUTPATIENT
Start: 2022-02-01 | End: 2022-04-05

## 2022-02-01 ASSESSMENT — PATIENT HEALTH QUESTIONNAIRE - PHQ9: CLINICAL INTERPRETATION OF PHQ2 SCORE: 0

## 2022-02-01 NOTE — PROGRESS NOTES
"Established Patient    Chief Complaint   Patient presents with   • Diabetes Follow-up       Subjective:     HPI:   Tammy presents today with the following.    Patient Active Problem List    Diagnosis Date Noted   • Overweight (BMI 25.0-29.9) 02/10/2021   • Vitamin D insufficiency 10/13/2016   • Dyslipidemia associated with type 2 diabetes mellitus (HCC) 02/10/2016   • Type 2 diabetes mellitus with hyperglycemia, without long-term current use of insulin ([facility]) 06/03/2013   • Hypertension associated with diabetes ([facility]) 06/03/2013       Current Outpatient Medications on File Prior to Visit   Medication Sig Dispense Refill   • MAGNESIUM PO Take  by mouth.     • Nutritional Supplements (ESTROVEN PO) Take  by mouth.     • Vitamin D, Cholecalciferol, 1000 UNITS Cap Take  by mouth.     • Cetirizine HCl (ZYRTEC ALLERGY PO) Take  by mouth.     • Fish Oil Oil by Does not apply route.     • POTASSIUM PO Take  by mouth.     • Acetaminophen (TYLENOL PO) Take  by mouth.       No current facility-administered medications on file prior to visit.       Allergies, past medical history, past surgical history, family history, social history reviewed and updated    ROS:  All other systems reviewed and are negative except as stated in the HPI       Physical Exam:     /60 (BP Location: Left arm, Patient Position: Sitting, BP Cuff Size: Adult)   Pulse 88   Temp 36.3 °C (97.4 °F) (Temporal)   Ht 1.549 m (5' 0.98\")   Wt 64.4 kg (142 lb)   LMP 02/01/2016 Comment: tubal ligation 2002  SpO2 98%   BMI 26.84 kg/m²   General: Normal appearing. No distress.  Pulmonary: Clear to ausculation.  Normal effort.   Cardiovascular: Regular rate and rhythm    Assessment and Plan:     61 y.o. female with the following issues.    1. Dyslipidemia associated with type 2 diabetes mellitus (HCC)  - atorvastatin (LIPITOR) 20 MG Tab; Take 1 tablet by mouth once daily  Dispense: 90 Tablet; Refill: 3    2. Type 2 diabetes mellitus with hyperglycemia, " without long-term current use of insulin (HCC)  >>she is compliant with Metformin 1000 twice daily, glipizide 5 mg daily, Trulicity 3 mg weekly, Farxiga 10 mg a day  > A1c improved, 6.7 today  - Microalbumin Creat Ratio Urine (Lab Collect); Future  - Comp Metabolic Panel; Future  - Diabetic Monofilament LE Exam  - Hemoglobin A1C; Future  - Dapagliflozin Propanediol (FARXIGA) 10 MG Tab; Take 1 tablet by mouth once daily  Dispense: 90 Tablet; Refill: 3  - Dulaglutide (TRULICITY) 3 MG/0.5ML Solution Pen-injector; Inject 1 Each under the skin every 7 days.  Dispense: 6 mL; Refill: 3    -Discussed diabetic diet in detail, educated regarding management of hypoglycemia, advised regular exercise, educated disease management and weight goals as well as feet care and frequent check of feet.  -Patient to check early morning fasting blood glucose with goal discussed.  - asked to have a BG log with daily fasting and 2 hr postprandial after biggest meal and bring to next visit or send through my chart  -Discussed side effects of medication. Patient confirmed understanding of information.    3. Hypertension associated with diabetes (HCC)  Chronic, stable, compliant with below:  - lisinopril-hydrochlorothiazide (PRINZIDE) 20-25 MG per tablet; Take 1 Tablet by mouth every day.  Dispense: 90 Tablet; Refill: 3    -discussion in details on target blood pressure goal, advised monitoring BP closely at home.  Have BP log to present at follow-up visit or send through my chart.   -Advised low-salt diet, healthy dietary option include plenty of vegetable, reduce carb and sugar, regular exercise as tolerated, healthy fat/protein, also avoid alcohol, no NSAIDs  If symptoms worsen or persist patient can return to clinic for reevaluation.  Red flags and strict emergency room precautions discussed.  Discussed side effects of medication. Patient understand    4. Carpal tunnel syndrome of left wrist  - Referral to hand surgery    Follow Up:       Return in about 4 months (around 6/1/2022).  Diabetes, a1c,   Please note that this dictation was created using voice recognition software. I have made every reasonable attempt to correct obvious errors, but I expect that there are errors of grammar and possibly content that I did not discover before finalizing the note.    Signed by: Pat Stewart M.D.

## 2022-02-01 NOTE — LETTER
[facility]  Pat Stewart M.D.  [address]  [place] [address]  Fax: [phone]   Authorization for Release/Disclosure of   Protected Health Information   Name: KORI ALEJO : 1960 SSN: [id number]   Address: [address] [place] [address] Phone:    [phone] (home)    I authorize the entity listed below to release/disclose the PHI below to:   [facility]/Pat Stewart M.D. and Pat Stewart M.D.   Provider or Entity Name:  [facility]   Address   City, State, Zip   Phone:      Fax:     Reason for request: continuity of care   Information to be released:    [  ] LAST COLONOSCOPY,  including any PATH REPORT and follow-up  [  ] LAST FIT/COLOGUARD RESULT [  ] LAST DEXA  [  ] LAST MAMMOGRAM  [  ] LAST PAP  [  ] LAST LABS [ x ] RETINA EXAM REPORT  [  ] IMMUNIZATION RECORDS  [  ] Release all info      [  ] Check here and initial the line next to each item to release ALL health information INCLUDING  _____ Care and treatment for drug and / or alcohol abuse  _____ HIV testing, infection status, or AIDS  _____ Genetic Testing    DATES OF SERVICE OR TIME PERIOD TO BE DISCLOSED: _____________  I understand and acknowledge that:  * This Authorization may be revoked at any time by you in writing, except if your health information has already been used or disclosed.  * Your health information that will be used or disclosed as a result of you signing this authorization could be re-disclosed by the recipient. If this occurs, your re-disclosed health information may no longer be protected by State or Federal laws.  * You may refuse to sign this Authorization. Your refusal will not affect your ability to obtain treatment.  * This Authorization becomes effective upon signing and will  on (date) __________.      If no date is indicated, this Authorization will  one (1) year from the signature date.    Name: Kori Alejo    Signature: continuity of  care   Date:     2/1/2022       PLEASE FAX REQUESTED RECORDS BACK TO: [phone]

## 2022-04-05 DIAGNOSIS — E11.69 DYSLIPIDEMIA ASSOCIATED WITH TYPE 2 DIABETES MELLITUS (HCC): ICD-10-CM

## 2022-04-05 DIAGNOSIS — I15.2 HYPERTENSION ASSOCIATED WITH DIABETES (HCC): ICD-10-CM

## 2022-04-05 DIAGNOSIS — E78.5 DYSLIPIDEMIA ASSOCIATED WITH TYPE 2 DIABETES MELLITUS (HCC): ICD-10-CM

## 2022-04-05 DIAGNOSIS — E11.65 TYPE 2 DIABETES MELLITUS WITH HYPERGLYCEMIA, WITHOUT LONG-TERM CURRENT USE OF INSULIN (HCC): ICD-10-CM

## 2022-04-05 DIAGNOSIS — E11.59 HYPERTENSION ASSOCIATED WITH DIABETES (HCC): ICD-10-CM

## 2022-04-05 NOTE — TELEPHONE ENCOUNTER
Received request via: {REFILL PATIENT/PHARMACY:[id number]}    Was the patient seen in the last year in this department? {YES / NO:40870}    Does the patient have an active prescription (recently filled or refills available) for medication(s) requested? {Yes / No:48021}

## 2022-04-06 RX ORDER — LISINOPRIL AND HYDROCHLOROTHIAZIDE 25; 20 MG/1; MG/1
TABLET ORAL
Qty: 90 TABLET | Refills: 0 | Status: SHIPPED | OUTPATIENT
Start: 2022-04-06 | End: 2022-07-05

## 2022-04-06 RX ORDER — DAPAGLIFLOZIN 10 MG/1
TABLET, FILM COATED ORAL
Qty: 90 TABLET | Refills: 0 | Status: SHIPPED | OUTPATIENT
Start: 2022-04-06 | End: 2022-07-05

## 2022-04-06 RX ORDER — GLIPIZIDE 5 MG/1
TABLET ORAL
Qty: 180 TABLET | Refills: 0 | Status: SHIPPED | OUTPATIENT
Start: 2022-04-06 | End: 2023-03-28

## 2022-04-06 RX ORDER — ATORVASTATIN CALCIUM 20 MG/1
TABLET, FILM COATED ORAL
Qty: 90 TABLET | Refills: 0 | Status: SHIPPED | OUTPATIENT
Start: 2022-04-06 | End: 2022-07-05

## 2022-06-06 ENCOUNTER — OFFICE VISIT (OUTPATIENT)
Dept: MEDICAL GROUP | Facility: PHYSICIAN GROUP | Age: 62
End: 2022-06-06
Payer: COMMERCIAL

## 2022-06-06 VITALS
TEMPERATURE: 97.5 F | HEIGHT: 60 IN | SYSTOLIC BLOOD PRESSURE: 126 MMHG | DIASTOLIC BLOOD PRESSURE: 60 MMHG | OXYGEN SATURATION: 98 % | RESPIRATION RATE: 16 BRPM | HEART RATE: 78 BPM | WEIGHT: 142.7 LBS | BODY MASS INDEX: 28.02 KG/M2

## 2022-06-06 DIAGNOSIS — E53.8 B12 DEFICIENCY: ICD-10-CM

## 2022-06-06 DIAGNOSIS — E11.65 TYPE 2 DIABETES MELLITUS WITH HYPERGLYCEMIA, WITHOUT LONG-TERM CURRENT USE OF INSULIN (HCC): ICD-10-CM

## 2022-06-06 DIAGNOSIS — Z13.29 SCREENING FOR THYROID DISORDER: ICD-10-CM

## 2022-06-06 DIAGNOSIS — E78.5 DYSLIPIDEMIA ASSOCIATED WITH TYPE 2 DIABETES MELLITUS (HCC): ICD-10-CM

## 2022-06-06 DIAGNOSIS — E11.69 DYSLIPIDEMIA ASSOCIATED WITH TYPE 2 DIABETES MELLITUS (HCC): ICD-10-CM

## 2022-06-06 DIAGNOSIS — I15.2 HYPERTENSION ASSOCIATED WITH DIABETES (HCC): ICD-10-CM

## 2022-06-06 DIAGNOSIS — E55.9 VITAMIN D INSUFFICIENCY: ICD-10-CM

## 2022-06-06 DIAGNOSIS — E11.59 HYPERTENSION ASSOCIATED WITH DIABETES (HCC): ICD-10-CM

## 2022-06-06 LAB
HBA1C MFR BLD: 6.5 % (ref 0–5.6)
INT CON NEG: ABNORMAL
INT CON POS: ABNORMAL

## 2022-06-06 PROCEDURE — 83036 HEMOGLOBIN GLYCOSYLATED A1C: CPT | Performed by: INTERNAL MEDICINE

## 2022-06-06 PROCEDURE — 99214 OFFICE O/P EST MOD 30 MIN: CPT | Performed by: INTERNAL MEDICINE

## 2022-06-06 NOTE — PROGRESS NOTES
Established Patient    Chief Complaint   Patient presents with   • Follow-Up     diabetes       Subjective:     HPI:   Tammy presents today with the following.    Patient Active Problem List    Diagnosis Date Noted   • B12 deficiency 06/06/2022   • Carpal tunnel syndrome of left wrist 02/01/2022   • Overweight (BMI 25.0-29.9) 02/10/2021   • Vitamin D insufficiency 10/13/2016   • Dyslipidemia associated with type 2 diabetes mellitus ([facility]) 02/10/2016   • Type 2 diabetes mellitus with hyperglycemia, without long-term current use of insulin ([facility]) 06/03/2013   • Hypertension associated with diabetes ([facility]) 06/03/2013       Current Outpatient Medications on File Prior to Visit   Medication Sig Dispense Refill   • metformin (GLUCOPHAGE) 1000 MG tablet TAKE 1 TABLET BY MOUTH TWICE DAILY WITH MEALS 180 Tablet 0   • glipiZIDE (GLUCOTROL) 5 MG Tab Take 1 tablet by mouth twice daily 180 Tablet 0   • atorvastatin (LIPITOR) 20 MG Tab Take 1 tablet by mouth once daily 90 Tablet 0   • lisinopril-hydrochlorothiazide (PRINZIDE) 20-25 MG per tablet Take 1 tablet by mouth once daily 90 Tablet 0   • FARXIGA 10 MG Tab Take 1 tablet by mouth once daily 90 Tablet 0   • Dulaglutide (TRULICITY) 3 MG/0.5ML Solution Pen-injector Inject 1 Each under the skin every 7 days. 6 mL 3   • MAGNESIUM PO Take  by mouth.     • Vitamin D, Cholecalciferol, 1000 UNITS Cap Take  by mouth.     • Cetirizine HCl (ZYRTEC ALLERGY PO) Take  by mouth.     • Fish Oil Oil by Does not apply route.     • POTASSIUM PO Take  by mouth.     • Acetaminophen (TYLENOL PO) Take  by mouth.       No current facility-administered medications on file prior to visit.       Allergies, past medical history, past surgical history, family history, social history reviewed and updated    ROS:  All other systems reviewed and are negative except as stated in the HPI       Physical Exam:     /60 (BP Location: Right arm, Patient Position: Sitting, BP Cuff Size: Adult)   Pulse 78    Temp 36.4 °C (97.5 °F) (Temporal)   Resp 16   Ht 1.524 m (5')   Wt 64.7 kg (142 lb 11.2 oz)   LMP 02/01/2016 Comment: tubal ligation 2002  SpO2 98%   BMI 27.87 kg/m²   General: Normal appearing. No distress.  Pulmonary: Clear to ausculation.  Normal effort.   Cardiovascular: Regular rate and rhythm    Assessment and Plan:     61 y.o. female with the following issues.    1. Type 2 diabetes mellitus with hyperglycemia, without long-term current use of insulin ([facility])  Today A1c 6.5, reported compliance with metformin 1000 twice daily, glipizide 5 mg twice daily, Farxiga 10 mg daily, Trulicity 3 mg daily, denied having related symptoms, patient is following up with pulmonology, denied any tingling numbness of the feet  - Comp Metabolic Panel; Future  - HEMOGLOBIN A1C; Future  - MICROALBUMIN CREAT RATIO URINE; Future  - Diabetic Monofilament LE Exam within normal limits    -Discussed diabetic diet in detail, educated regarding management of hypoglycemia, advised regular exercise, educated disease management and weight goals as well as feet care and frequent check of feet.  -Patient to check early morning fasting blood glucose with goal discussed.  - asked to have a BG log with daily fasting and 2 hr postprandial after biggest meal and bring to next visit or send through my chart  -Discussed side effects of medication. Patient confirmed understanding of information.    2. Hypertension associated with diabetes ([facility])  Well-controlled, she is taking lisinopril hydrochlorothiazide 20-25 mg daily, denied having related symptoms    3. Dyslipidemia associated with type 2 diabetes mellitus ([facility])  Patient continue taking Lipitor 10 mg daily, denies side effect  - Lipid Profile; Future    4. Vitamin D insufficiency  - VITAMIN D,25 HYDROXY; Future    5. B12 deficiency  - VITAMIN B12; Future    6. Screening for thyroid disorder  - FREE THYROXINE; Future  - T3 FREE; Future  - THYROID PEROXIDASE  (TPO) AB; Future  - TSH;  Future    Follow Up:      Return in about 3 months (around 9/6/2022).  Labs, diabetes,  Please note that this dictation was created using voice recognition software. I have made every reasonable attempt to correct obvious errors, but I expect that there are errors of grammar and possibly content that I did not discover before finalizing the note.    Signed by: Pat Stewart M.D.

## 2022-06-09 PROBLEM — Z47.89 ORTHOPEDIC AFTERCARE: Status: ACTIVE | Noted: 2022-06-09

## 2022-07-01 DIAGNOSIS — I15.2 HYPERTENSION ASSOCIATED WITH DIABETES (HCC): ICD-10-CM

## 2022-07-01 DIAGNOSIS — E11.65 TYPE 2 DIABETES MELLITUS WITH HYPERGLYCEMIA, WITHOUT LONG-TERM CURRENT USE OF INSULIN (HCC): ICD-10-CM

## 2022-07-01 DIAGNOSIS — E11.59 HYPERTENSION ASSOCIATED WITH DIABETES (HCC): ICD-10-CM

## 2022-07-01 DIAGNOSIS — E11.69 DYSLIPIDEMIA ASSOCIATED WITH TYPE 2 DIABETES MELLITUS (HCC): ICD-10-CM

## 2022-07-01 DIAGNOSIS — E78.5 DYSLIPIDEMIA ASSOCIATED WITH TYPE 2 DIABETES MELLITUS (HCC): ICD-10-CM

## 2022-07-05 RX ORDER — ATORVASTATIN CALCIUM 20 MG/1
TABLET, FILM COATED ORAL
Qty: 90 TABLET | Refills: 0 | Status: SHIPPED | OUTPATIENT
Start: 2022-07-05 | End: 2022-10-03

## 2022-07-05 RX ORDER — DAPAGLIFLOZIN 10 MG/1
TABLET, FILM COATED ORAL
Qty: 90 TABLET | Refills: 0 | Status: SHIPPED | OUTPATIENT
Start: 2022-07-05 | End: 2022-10-03

## 2022-07-05 RX ORDER — LISINOPRIL AND HYDROCHLOROTHIAZIDE 25; 20 MG/1; MG/1
TABLET ORAL
Qty: 90 TABLET | Refills: 0 | Status: SHIPPED | OUTPATIENT
Start: 2022-07-05 | End: 2022-10-03

## 2022-09-08 ENCOUNTER — OFFICE VISIT (OUTPATIENT)
Dept: MEDICAL GROUP | Facility: PHYSICIAN GROUP | Age: 62
End: 2022-09-08
Payer: COMMERCIAL

## 2022-09-08 VITALS
BODY MASS INDEX: 27.68 KG/M2 | HEIGHT: 60 IN | OXYGEN SATURATION: 98 % | TEMPERATURE: 98 F | WEIGHT: 141 LBS | HEART RATE: 78 BPM | DIASTOLIC BLOOD PRESSURE: 64 MMHG | SYSTOLIC BLOOD PRESSURE: 110 MMHG | RESPIRATION RATE: 14 BRPM

## 2022-09-08 DIAGNOSIS — Z12.31 ENCOUNTER FOR SCREENING MAMMOGRAM FOR MALIGNANT NEOPLASM OF BREAST: ICD-10-CM

## 2022-09-08 DIAGNOSIS — E11.65 TYPE 2 DIABETES MELLITUS WITH HYPERGLYCEMIA, WITHOUT LONG-TERM CURRENT USE OF INSULIN (HCC): ICD-10-CM

## 2022-09-08 DIAGNOSIS — I15.2 HYPERTENSION ASSOCIATED WITH DIABETES (HCC): ICD-10-CM

## 2022-09-08 DIAGNOSIS — E11.59 HYPERTENSION ASSOCIATED WITH DIABETES (HCC): ICD-10-CM

## 2022-09-08 DIAGNOSIS — Z12.11 SCREEN FOR COLON CANCER: ICD-10-CM

## 2022-09-08 PROCEDURE — 99214 OFFICE O/P EST MOD 30 MIN: CPT | Performed by: INTERNAL MEDICINE

## 2022-09-08 NOTE — PROGRESS NOTES
Established Patient    Chief Complaint   Patient presents with    Follow-Up       Subjective:     HPI:   Tammy presents today with the following.    Patient Active Problem List    Diagnosis Date Noted    Orthopedic aftercare 06/09/2022    B12 deficiency 06/06/2022    Carpal tunnel syndrome of left wrist 02/01/2022    Overweight (BMI 25.0-29.9) 02/10/2021    Vitamin D insufficiency 10/13/2016    Dyslipidemia associated with type 2 diabetes mellitus ([facility]) 02/10/2016    Type 2 diabetes mellitus with hyperglycemia, without long-term current use of insulin ([facility]) 06/03/2013    Hypertension associated with diabetes ([facility]) 06/03/2013       Current Outpatient Medications on File Prior to Visit   Medication Sig Dispense Refill    metformin (GLUCOPHAGE) 1000 MG tablet TAKE 1 TABLET BY MOUTH TWICE DAILY WITH MEALS 180 Tablet 0    atorvastatin (LIPITOR) 20 MG Tab Take 1 tablet by mouth once daily 90 Tablet 0    lisinopril-hydrochlorothiazide (PRINZIDE) 20-25 MG per tablet Take 1 tablet by mouth once daily 90 Tablet 0    FARXIGA 10 MG Tab Take 1 tablet by mouth once daily 90 Tablet 0    glipiZIDE (GLUCOTROL) 5 MG Tab Take 1 tablet by mouth twice daily 180 Tablet 0    Dulaglutide (TRULICITY) 3 MG/0.5ML Solution Pen-injector Inject 1 Each under the skin every 7 days. 6 mL 3    MAGNESIUM PO Take  by mouth.      Vitamin D, Cholecalciferol, 1000 UNITS Cap Take  by mouth.      Cetirizine HCl (ZYRTEC ALLERGY PO) Take  by mouth.      Fish Oil Oil by Does not apply route.      POTASSIUM PO Take  by mouth.      Acetaminophen (TYLENOL PO) Take  by mouth.       No current facility-administered medications on file prior to visit.       Allergies, past medical history, past surgical history, family history, social history reviewed and updated    ROS:  All other systems reviewed and are negative except as stated in the HPI       Physical Exam:     /64 (BP Location: Right arm, Patient Position: Sitting, BP Cuff Size: Adult)   Pulse 78    Temp 36.7 °C (98 °F) (Temporal)   Resp 14   Ht 1.524 m (5')   Wt 64 kg (141 lb)   LMP 02/01/2016 Comment: tubal ligation 2002  SpO2 98%   BMI 27.54 kg/m²   General: Normal appearing. No distress.  Pulmonary: Clear to ausculation.  Normal effort.   Cardiovascular: Regular rate and rhythm    Assessment and Plan:     62 y.o. female with the following issues.    1. Type 2 diabetes mellitus with hyperglycemia, without long-term current use of insulin ([facility])  Patient check fasting blood sugar with normal range, she is compliant with metformin 1000 twice daily, glipizide 5 mg daily, Farxiga 10 mg daily, Trulicity 3 mg weekly, denied having related symptoms, advised to follow-up with ophthalmology as well  -There is pending labs, patient will perform labs and follow-up with me as well    2. Hypertension associated with diabetes (HCC)  Well-controlled, continue lisinopril-hydrochlorothiazide 20-25 mg daily, denies any symptoms related    3. Screen for colon cancer  - COLOGUARD COLON CANCER SCREENING    4. Encounter for screening mammogram for malignant neoplasm of breast  - MA-SCREENING MAMMO BILAT W/CAD; Future    > Discussed about Pap smear, she will think about that and make an appointment with a provider if she is willing to do.     Follow Up:   After performing labs which is still there from last visit     Please note that this dictation was created using voice recognition software. I have made every reasonable attempt to correct obvious errors, but I expect that there are errors of grammar and possibly content that I did not discover before finalizing the note.    Signed by: Pat Stewart M.D.

## 2022-09-19 ENCOUNTER — HOSPITAL ENCOUNTER (OUTPATIENT)
Dept: LAB | Facility: MEDICAL CENTER | Age: 62
End: 2022-09-19
Attending: INTERNAL MEDICINE
Payer: COMMERCIAL

## 2022-09-19 DIAGNOSIS — Z13.29 SCREENING FOR THYROID DISORDER: ICD-10-CM

## 2022-09-19 DIAGNOSIS — E78.5 DYSLIPIDEMIA ASSOCIATED WITH TYPE 2 DIABETES MELLITUS (HCC): ICD-10-CM

## 2022-09-19 DIAGNOSIS — E11.69 DYSLIPIDEMIA ASSOCIATED WITH TYPE 2 DIABETES MELLITUS (HCC): ICD-10-CM

## 2022-09-19 DIAGNOSIS — E55.9 VITAMIN D INSUFFICIENCY: ICD-10-CM

## 2022-09-19 DIAGNOSIS — E53.8 B12 DEFICIENCY: ICD-10-CM

## 2022-09-19 DIAGNOSIS — E11.65 TYPE 2 DIABETES MELLITUS WITH HYPERGLYCEMIA, WITHOUT LONG-TERM CURRENT USE OF INSULIN (HCC): ICD-10-CM

## 2022-09-19 LAB
25(OH)D3 SERPL-MCNC: 59 NG/ML (ref 30–100)
ALBUMIN SERPL BCP-MCNC: 4.4 G/DL (ref 3.2–4.9)
ALBUMIN/GLOB SERPL: 1.8 G/DL
ALP SERPL-CCNC: 60 U/L (ref 30–99)
ALT SERPL-CCNC: 13 U/L (ref 2–50)
ANION GAP SERPL CALC-SCNC: 13 MMOL/L (ref 7–16)
AST SERPL-CCNC: 15 U/L (ref 12–45)
BILIRUB SERPL-MCNC: 0.9 MG/DL (ref 0.1–1.5)
BUN SERPL-MCNC: 17 MG/DL (ref 8–22)
CALCIUM SERPL-MCNC: 9.4 MG/DL (ref 8.5–10.5)
CHLORIDE SERPL-SCNC: 99 MMOL/L (ref 96–112)
CHOLEST SERPL-MCNC: 124 MG/DL (ref 100–199)
CO2 SERPL-SCNC: 26 MMOL/L (ref 20–33)
CREAT SERPL-MCNC: 0.76 MG/DL (ref 0.5–1.4)
CREAT UR-MCNC: 52.41 MG/DL
EST. AVERAGE GLUCOSE BLD GHB EST-MCNC: 137 MG/DL
FASTING STATUS PATIENT QL REPORTED: NORMAL
GFR SERPLBLD CREATININE-BSD FMLA CKD-EPI: 88 ML/MIN/1.73 M 2
GLOBULIN SER CALC-MCNC: 2.4 G/DL (ref 1.9–3.5)
GLUCOSE SERPL-MCNC: 73 MG/DL (ref 65–99)
HBA1C MFR BLD: 6.4 % (ref 4–5.6)
HDLC SERPL-MCNC: 52 MG/DL
LDLC SERPL CALC-MCNC: 56 MG/DL
MICROALBUMIN UR-MCNC: <1.2 MG/DL
MICROALBUMIN/CREAT UR: NORMAL MG/G (ref 0–30)
POTASSIUM SERPL-SCNC: 3.9 MMOL/L (ref 3.6–5.5)
PROT SERPL-MCNC: 6.8 G/DL (ref 6–8.2)
SODIUM SERPL-SCNC: 138 MMOL/L (ref 135–145)
T3FREE SERPL-MCNC: 2.57 PG/ML (ref 2–4.4)
T4 FREE SERPL-MCNC: 1.33 NG/DL (ref 0.93–1.7)
THYROPEROXIDASE AB SERPL-ACNC: <9 IU/ML (ref 0–9)
TRIGL SERPL-MCNC: 80 MG/DL (ref 0–149)
TSH SERPL DL<=0.005 MIU/L-ACNC: 2.2 UIU/ML (ref 0.38–5.33)
VIT B12 SERPL-MCNC: 1433 PG/ML (ref 211–911)

## 2022-09-19 PROCEDURE — 86376 MICROSOMAL ANTIBODY EACH: CPT

## 2022-09-19 PROCEDURE — 82043 UR ALBUMIN QUANTITATIVE: CPT

## 2022-09-19 PROCEDURE — 84439 ASSAY OF FREE THYROXINE: CPT

## 2022-09-19 PROCEDURE — 82607 VITAMIN B-12: CPT

## 2022-09-19 PROCEDURE — 83036 HEMOGLOBIN GLYCOSYLATED A1C: CPT

## 2022-09-19 PROCEDURE — 82570 ASSAY OF URINE CREATININE: CPT

## 2022-09-19 PROCEDURE — 84443 ASSAY THYROID STIM HORMONE: CPT

## 2022-09-19 PROCEDURE — 80053 COMPREHEN METABOLIC PANEL: CPT

## 2022-09-19 PROCEDURE — 80061 LIPID PANEL: CPT

## 2022-09-19 PROCEDURE — 82306 VITAMIN D 25 HYDROXY: CPT

## 2022-09-19 PROCEDURE — 84481 FREE ASSAY (FT-3): CPT

## 2022-10-02 DIAGNOSIS — I15.2 HYPERTENSION ASSOCIATED WITH DIABETES (HCC): ICD-10-CM

## 2022-10-02 DIAGNOSIS — E78.5 DYSLIPIDEMIA ASSOCIATED WITH TYPE 2 DIABETES MELLITUS (HCC): ICD-10-CM

## 2022-10-02 DIAGNOSIS — E11.59 HYPERTENSION ASSOCIATED WITH DIABETES (HCC): ICD-10-CM

## 2022-10-02 DIAGNOSIS — E11.65 TYPE 2 DIABETES MELLITUS WITH HYPERGLYCEMIA, WITHOUT LONG-TERM CURRENT USE OF INSULIN (HCC): ICD-10-CM

## 2022-10-02 DIAGNOSIS — E11.69 DYSLIPIDEMIA ASSOCIATED WITH TYPE 2 DIABETES MELLITUS (HCC): ICD-10-CM

## 2022-10-03 RX ORDER — DAPAGLIFLOZIN 10 MG/1
TABLET, FILM COATED ORAL
Qty: 90 TABLET | Refills: 0 | Status: SHIPPED | OUTPATIENT
Start: 2022-10-03 | End: 2023-01-03

## 2022-10-03 RX ORDER — ATORVASTATIN CALCIUM 20 MG/1
TABLET, FILM COATED ORAL
Qty: 90 TABLET | Refills: 0 | Status: SHIPPED | OUTPATIENT
Start: 2022-10-03 | End: 2023-01-03

## 2022-10-03 RX ORDER — LISINOPRIL AND HYDROCHLOROTHIAZIDE 25; 20 MG/1; MG/1
TABLET ORAL
Qty: 90 TABLET | Refills: 0 | Status: SHIPPED | OUTPATIENT
Start: 2022-10-03 | End: 2023-01-03

## 2022-10-17 ENCOUNTER — APPOINTMENT (OUTPATIENT)
Dept: MEDICAL GROUP | Facility: PHYSICIAN GROUP | Age: 62
End: 2022-10-17
Payer: COMMERCIAL

## 2022-11-01 ENCOUNTER — OFFICE VISIT (OUTPATIENT)
Dept: MEDICAL GROUP | Facility: PHYSICIAN GROUP | Age: 62
End: 2022-11-01
Payer: COMMERCIAL

## 2022-11-01 VITALS
WEIGHT: 142 LBS | HEIGHT: 62 IN | HEART RATE: 78 BPM | RESPIRATION RATE: 14 BRPM | TEMPERATURE: 97.3 F | DIASTOLIC BLOOD PRESSURE: 60 MMHG | OXYGEN SATURATION: 97 % | SYSTOLIC BLOOD PRESSURE: 108 MMHG | BODY MASS INDEX: 26.13 KG/M2

## 2022-11-01 DIAGNOSIS — E11.59 HYPERTENSION ASSOCIATED WITH DIABETES (HCC): ICD-10-CM

## 2022-11-01 DIAGNOSIS — E11.65 TYPE 2 DIABETES MELLITUS WITH HYPERGLYCEMIA, WITHOUT LONG-TERM CURRENT USE OF INSULIN (HCC): ICD-10-CM

## 2022-11-01 DIAGNOSIS — I15.2 HYPERTENSION ASSOCIATED WITH DIABETES (HCC): ICD-10-CM

## 2022-11-01 DIAGNOSIS — E55.9 VITAMIN D INSUFFICIENCY: ICD-10-CM

## 2022-11-01 PROCEDURE — 99214 OFFICE O/P EST MOD 30 MIN: CPT | Performed by: INTERNAL MEDICINE

## 2022-11-01 NOTE — PROGRESS NOTES
Established Patient    Chief Complaint   Patient presents with    Results     09/19       Subjective:     HPI:   Tammy presents today with the following.    Patient Active Problem List    Diagnosis Date Noted    Orthopedic aftercare 06/09/2022    B12 deficiency 06/06/2022    Carpal tunnel syndrome of left wrist 02/01/2022    Overweight (BMI 25.0-29.9) 02/10/2021    Vitamin D insufficiency 10/13/2016    Dyslipidemia associated with type 2 diabetes mellitus ([facility]) 02/10/2016    Type 2 diabetes mellitus with hyperglycemia, without long-term current use of insulin ([facility]) 06/03/2013    Hypertension associated with diabetes ([facility]) 06/03/2013       Current Outpatient Medications on File Prior to Visit   Medication Sig Dispense Refill    atorvastatin (LIPITOR) 20 MG Tab Take 1 tablet by mouth once daily 90 Tablet 0    lisinopril-hydrochlorothiazide (PRINZIDE) 20-25 MG per tablet Take 1 tablet by mouth once daily 90 Tablet 0    FARXIGA 10 MG Tab Take 1 tablet by mouth once daily 90 Tablet 0    metformin (GLUCOPHAGE) 1000 MG tablet TAKE 1 TABLET BY MOUTH TWICE DAILY WITH MEALS 180 Tablet 0    glipiZIDE (GLUCOTROL) 5 MG Tab Take 1 tablet by mouth twice daily 180 Tablet 0    Dulaglutide (TRULICITY) 3 MG/0.5ML Solution Pen-injector Inject 1 Each under the skin every 7 days. 6 mL 3    MAGNESIUM PO Take  by mouth.      Vitamin D, Cholecalciferol, 1000 UNITS Cap Take  by mouth.      Cetirizine HCl (ZYRTEC ALLERGY PO) Take  by mouth.      Fish Oil Oil by Does not apply route.      POTASSIUM PO Take  by mouth.      Acetaminophen (TYLENOL PO) Take  by mouth.       No current facility-administered medications on file prior to visit.       Allergies, past medical history, past surgical history, family history, social history reviewed and updated    ROS:  All other systems reviewed and are negative except as stated in the HPI       Physical Exam:     /60 (BP Location: Right arm, Patient Position: Sitting, BP Cuff Size: Adult)    "Pulse 78   Temp 36.3 °C (97.3 °F) (Temporal)   Resp 14   Ht 1.575 m (5' 2\")   Wt 64.4 kg (142 lb)   LMP 02/01/2016 Comment: tubal ligation 2002  SpO2 97%   BMI 25.97 kg/m²   General: Normal appearing. No distress.  Pulmonary: Clear to ausculation.  Normal effort.   Cardiovascular: Regular rate and rhythm    Assessment and Plan:     62 y.o. female with the following issues.    1. Type 2 diabetes mellitus with hyperglycemia, without long-term current use of insulin (HCC)    2. Hypertension associated with diabetes (HCC)    3. Vitamin D insufficiency    Patient had labs with better A1c, she is compliant with metformin 1000 twice daily, glipizide 5 mg daily, Farxiga 10 mg daily, Trulicity 3 mg daily, denied having symptoms related, advised to follow-up with ophthalmology as well    Blood pressure within normal range, continue blood pressure medication as well as vitamin D supplement    -Discussed diabetic diet in detail, educated regarding management of hypoglycemia, advised regular exercise, educated disease management and weight goals as well as feet care and frequent check of feet.  -Patient to check early morning fasting blood glucose with goal discussed.  - asked to have a BG log with daily fasting and 2 hr postprandial after biggest meal and bring to next visit or send through my chart  -Discussed side effects of medication. Patient confirmed understanding of information.    Follow Up:      Return in about 4 months (around 3/1/2023) for diabetes, A1c.    Please note that this dictation was created using voice recognition software. I have made every reasonable attempt to correct obvious errors, but I expect that there are errors of grammar and possibly content that I did not discover before finalizing the note.    Signed by: Pat Stewart M.D.      "

## 2022-12-13 ENCOUNTER — OFFICE VISIT (OUTPATIENT)
Dept: MEDICAL GROUP | Facility: PHYSICIAN GROUP | Age: 62
End: 2022-12-13
Payer: COMMERCIAL

## 2022-12-13 ENCOUNTER — HOSPITAL ENCOUNTER (OUTPATIENT)
Facility: MEDICAL CENTER | Age: 62
End: 2022-12-13
Attending: NURSE PRACTITIONER
Payer: COMMERCIAL

## 2022-12-13 VITALS
HEART RATE: 65 BPM | SYSTOLIC BLOOD PRESSURE: 108 MMHG | DIASTOLIC BLOOD PRESSURE: 56 MMHG | BODY MASS INDEX: 26.04 KG/M2 | HEIGHT: 62 IN | OXYGEN SATURATION: 97 % | RESPIRATION RATE: 16 BRPM | TEMPERATURE: 97.7 F | WEIGHT: 141.5 LBS

## 2022-12-13 DIAGNOSIS — Z11.51 ENCOUNTER FOR SCREENING FOR HUMAN PAPILLOMAVIRUS (HPV): ICD-10-CM

## 2022-12-13 DIAGNOSIS — Z12.12 ENCOUNTER FOR SCREENING FOR COLORECTAL MALIGNANT NEOPLASM: ICD-10-CM

## 2022-12-13 DIAGNOSIS — Z01.419 ENCOUNTER FOR CERVICAL PAP SMEAR WITH PELVIC EXAM: ICD-10-CM

## 2022-12-13 DIAGNOSIS — Z12.11 ENCOUNTER FOR SCREENING FOR COLORECTAL MALIGNANT NEOPLASM: ICD-10-CM

## 2022-12-13 PROCEDURE — 88175 CYTOPATH C/V AUTO FLUID REDO: CPT

## 2022-12-13 PROCEDURE — 99396 PREV VISIT EST AGE 40-64: CPT | Performed by: NURSE PRACTITIONER

## 2022-12-13 PROCEDURE — 99000 SPECIMEN HANDLING OFFICE-LAB: CPT | Performed by: NURSE PRACTITIONER

## 2022-12-13 PROCEDURE — 87624 HPV HI-RISK TYP POOLED RSLT: CPT

## 2022-12-13 NOTE — PROGRESS NOTES
Subjective:     CC:   Chief Complaint   Patient presents with    Gynecologic Exam       HPI:   Tammy Alejo is a 62 y.o. female who presents for annual exam    Patient has GYN provider: No   Last Pap Smear: 2016  H/O Abnormal Pap: No  Last Mammogram: Has been several years. Patient has order placed for mammogram.   Last Bone Density Test: Patient is on vitamin D and has adequate calcium in her diet. Will start DEXA when she is 5-10 years postmenopausal.   Last Colorectal Cancer Screening: will order fit test.   Last Tdap: , will be due   Received HPV series: Aged out    Exercise: minimal exercise, one hour walking weekly, moderate regular exercise, aerobic < 3 days a week      Patient's last menstrual period was 2016.  She has not utilized hormone replacement therapy.  Reports hot flashes and night sweats  No significant bloating/fluid retention, pelvic pain, or dyspareunia. No abnormal vaginal discharge.   No breast tenderness, mass, nipple discharge or changes in size or contour.    OB History    Para Term  AB Living   2 0 0 0 1 1   SAB IAB Ectopic Molar Multiple Live Births   0 0 0 0 0 0      She  reports being sexually active and has had partner(s) who are male. She reports using the following method of birth control/protection: Surgical.    She  has a past medical history of Allergy, Arrhythmia, Arthritis, Diabetes, Diabetic neuropathy (HCC), Dyslipidemia associated with type 2 diabetes mellitus (HCC) (2/10/2016), Heart murmur, and Hypertension.  She  has a past surgical history that includes calcaneus orif; carpal tunnel endoscopic; primary c section; dental extraction(s); tubal coagulation laparoscopic bilateral; cataract phaco with iol (Right, 2016); cataract phaco with iol (Left, 2016); and pr neuroplasty & or transpos median nrv carpal bebeto (Left, 2022).    Family History   Problem Relation Age of Onset    Alcohol/Drug Mother     Arthritis Mother      Hyperlipidemia Mother     Heart Disease Mother     Hypertension Mother     Diabetes Mother     Lung Disease Father         mesothemoloma, cancer    Cancer Father     Hyperlipidemia Father     Hypertension Father     Heart Disease Father     Diabetes Father     Psychiatric Illness Sister         Bipolar    Heart Disease Maternal Grandmother     Hypertension Maternal Grandmother     Hyperlipidemia Maternal Grandmother     Genetic Disorder Maternal Grandfather         Parkinsons    Heart Disease Paternal Grandmother     Hypertension Paternal Grandmother     Hyperlipidemia Paternal Grandmother     Heart Disease Paternal Grandfather     Hypertension Paternal Grandfather     Hyperlipidemia Paternal Grandfather      Social History     Tobacco Use    Smoking status: Never    Smokeless tobacco: Never   Vaping Use    Vaping Use: Never used   Substance Use Topics    Alcohol use: No     Alcohol/week: 0.0 oz     Comment: Rarely    Drug use: No       Patient Active Problem List    Diagnosis Date Noted    Orthopedic aftercare 06/09/2022    B12 deficiency 06/06/2022    Carpal tunnel syndrome of left wrist 02/01/2022    Overweight (BMI 25.0-29.9) 02/10/2021    Vitamin D insufficiency 10/13/2016    Dyslipidemia associated with type 2 diabetes mellitus ([facility]) 02/10/2016    Type 2 diabetes mellitus with hyperglycemia, without long-term current use of insulin ([facility]) 06/03/2013    Hypertension associated with diabetes ([facility]) 06/03/2013     Current Outpatient Medications   Medication Sig Dispense Refill    atorvastatin (LIPITOR) 20 MG Tab Take 1 tablet by mouth once daily 90 Tablet 0    lisinopril-hydrochlorothiazide (PRINZIDE) 20-25 MG per tablet Take 1 tablet by mouth once daily 90 Tablet 0    FARXIGA 10 MG Tab Take 1 tablet by mouth once daily 90 Tablet 0    metformin (GLUCOPHAGE) 1000 MG tablet TAKE 1 TABLET BY MOUTH TWICE DAILY WITH MEALS 180 Tablet 0    glipiZIDE (GLUCOTROL) 5 MG Tab Take 1 tablet by mouth twice daily 180 Tablet 0     "Dulaglutide (TRULICITY) 3 MG/0.5ML Solution Pen-injector Inject 1 Each under the skin every 7 days. 6 mL 3    MAGNESIUM PO Take  by mouth.      Vitamin D, Cholecalciferol, 1000 UNITS Cap Take  by mouth.      Cetirizine HCl (ZYRTEC ALLERGY PO) Take  by mouth.      Fish Oil Oil by Does not apply route.      POTASSIUM PO Take  by mouth.      Acetaminophen (TYLENOL PO) Take  by mouth.       No current facility-administered medications for this visit.     Allergies   Allergen Reactions    Januvia [Sitagliptin] Unspecified     Headaches, nausea, vomiting    Jardiance [Empagliflozin]      Constipation, rash, palpitations, dizziness    Sulfa Drugs     Tape          Objective:   /56 (BP Location: Left arm, Patient Position: Sitting, BP Cuff Size: Adult)   Pulse 65   Temp 36.5 °C (97.7 °F) (Temporal)   Resp 16   Ht 1.575 m (5' 2\")   Wt 64.2 kg (141 lb 8 oz)   LMP 02/01/2016 Comment: tubal ligation 2002  SpO2 97%   BMI 25.88 kg/m²     Wt Readings from Last 4 Encounters:   12/13/22 64.2 kg (141 lb 8 oz)   11/01/22 64.4 kg (142 lb)   09/08/22 64 kg (141 lb)   06/09/22 64 kg (141 lb 3.3 oz)       A chaperone was offered to the patient during today's exam. Chaperone name: TessaJOSTIN acosta was present.    Physical Exam  Vitals reviewed.   Constitutional:       Appearance: Normal appearance.   HENT:      Head: Normocephalic and atraumatic.      Mouth/Throat:      Mouth: Mucous membranes are moist.   Eyes:      Extraocular Movements: Extraocular movements intact.      Conjunctiva/sclera: Conjunctivae normal.   Pulmonary:      Effort: Pulmonary effort is normal.   Musculoskeletal:         General: Normal range of motion.      Cervical back: Normal range of motion.   Skin:     General: Skin is warm and dry.   Neurological:      General: No focal deficit present.      Mental Status: She is alert and oriented to person, place, and time.   Psychiatric:         Mood and Affect: Mood normal.         Behavior: Behavior normal.         " Thought Content: Thought content normal.       Breast: Breasts examined seated and supine. No skin changes, peau d'orange or nipple retraction. No discharge. No axillary or supraclavicular adenopathy. No masses or nodularity palpable.    : Perineum and external genitalia normal without rash. Vagina with normal and physiologic discharge. Cervix with nabothian cyst at 2 oclock, no discharge. Bimanual exam without adnexal masses or cervical motion tenderness.      Assessment and Plan:     1. Encounter for screening for colorectal malignant neoplasm  - OCCULT BLOOD FECES IMMUNOASSAY; Future    2. Encounter for cervical Pap smear with pelvic exam  - THINPREP PAP WITH HPV; Future    3. Encounter for screening for human papillomavirus (HPV)  - THINPREP PAP WITH HPV; Future    Health maintenance: Orders placed, age-appropriate anticipatory guidance discussed.  Patient counseled about skin care, diet, supplements, and exercise.  Discussed  breast self exam, mammography screening, menopause, osteoporosis, adequate intake of calcium and vitamin D, diet and exercise, colorectal cancer screening     Follow-up: Return for as needed or yearly.

## 2022-12-14 LAB
CYTOLOGY REG CYTOL: NORMAL
HPV HR 12 DNA CVX QL NAA+PROBE: NEGATIVE
HPV16 DNA SPEC QL NAA+PROBE: NEGATIVE
HPV18 DNA SPEC QL NAA+PROBE: NEGATIVE
SPECIMEN SOURCE: NORMAL

## 2023-01-02 DIAGNOSIS — E78.5 DYSLIPIDEMIA ASSOCIATED WITH TYPE 2 DIABETES MELLITUS (HCC): ICD-10-CM

## 2023-01-02 DIAGNOSIS — E11.59 HYPERTENSION ASSOCIATED WITH DIABETES (HCC): ICD-10-CM

## 2023-01-02 DIAGNOSIS — E11.65 TYPE 2 DIABETES MELLITUS WITH HYPERGLYCEMIA, WITHOUT LONG-TERM CURRENT USE OF INSULIN (HCC): ICD-10-CM

## 2023-01-02 DIAGNOSIS — I15.2 HYPERTENSION ASSOCIATED WITH DIABETES (HCC): ICD-10-CM

## 2023-01-02 DIAGNOSIS — E11.69 DYSLIPIDEMIA ASSOCIATED WITH TYPE 2 DIABETES MELLITUS (HCC): ICD-10-CM

## 2023-01-03 RX ORDER — ATORVASTATIN CALCIUM 20 MG/1
TABLET, FILM COATED ORAL
Qty: 90 TABLET | Refills: 0 | Status: SHIPPED | OUTPATIENT
Start: 2023-01-03 | End: 2023-03-28

## 2023-01-03 RX ORDER — DAPAGLIFLOZIN 10 MG/1
TABLET, FILM COATED ORAL
Qty: 90 TABLET | Refills: 0 | Status: SHIPPED | OUTPATIENT
Start: 2023-01-03 | End: 2023-03-28

## 2023-01-03 RX ORDER — LISINOPRIL AND HYDROCHLOROTHIAZIDE 25; 20 MG/1; MG/1
TABLET ORAL
Qty: 90 TABLET | Refills: 0 | Status: SHIPPED | OUTPATIENT
Start: 2023-01-03 | End: 2023-03-28

## 2023-01-24 DIAGNOSIS — E11.65 TYPE 2 DIABETES MELLITUS WITH HYPERGLYCEMIA, WITHOUT LONG-TERM CURRENT USE OF INSULIN (HCC): ICD-10-CM

## 2023-01-24 RX ORDER — DULAGLUTIDE 3 MG/.5ML
INJECTION, SOLUTION SUBCUTANEOUS
Qty: 12 ML | Refills: 0 | Status: SHIPPED | OUTPATIENT
Start: 2023-01-24 | End: 2023-06-16

## 2023-02-27 NOTE — TELEPHONE ENCOUNTER
Received request via: Patient    Was the patient seen in the last year in this department? Yes    Does the patient have an active prescription (recently filled or refills available) for medication(s) requested? No    Does the patient have residential Plus and need 100 day supply (blood pressure, diabetes and cholesterol meds only)? Patient does not have SCP

## 2023-03-26 DIAGNOSIS — E11.69 DYSLIPIDEMIA ASSOCIATED WITH TYPE 2 DIABETES MELLITUS (HCC): ICD-10-CM

## 2023-03-26 DIAGNOSIS — E11.65 TYPE 2 DIABETES MELLITUS WITH HYPERGLYCEMIA, WITHOUT LONG-TERM CURRENT USE OF INSULIN (HCC): ICD-10-CM

## 2023-03-26 DIAGNOSIS — I15.2 HYPERTENSION ASSOCIATED WITH DIABETES (HCC): ICD-10-CM

## 2023-03-26 DIAGNOSIS — E78.5 DYSLIPIDEMIA ASSOCIATED WITH TYPE 2 DIABETES MELLITUS (HCC): ICD-10-CM

## 2023-03-26 DIAGNOSIS — E11.59 HYPERTENSION ASSOCIATED WITH DIABETES (HCC): ICD-10-CM

## 2023-03-28 RX ORDER — DAPAGLIFLOZIN 10 MG/1
TABLET, FILM COATED ORAL
Qty: 90 TABLET | Refills: 0 | Status: SHIPPED | OUTPATIENT
Start: 2023-03-28 | End: 2023-07-03 | Stop reason: SDUPTHER

## 2023-03-28 RX ORDER — GLIPIZIDE 5 MG/1
TABLET ORAL
Qty: 180 TABLET | Refills: 0 | Status: SHIPPED | OUTPATIENT
Start: 2023-03-28 | End: 2023-07-03 | Stop reason: SDUPTHER

## 2023-03-28 RX ORDER — LISINOPRIL AND HYDROCHLOROTHIAZIDE 25; 20 MG/1; MG/1
TABLET ORAL
Qty: 90 TABLET | Refills: 0 | Status: SHIPPED | OUTPATIENT
Start: 2023-03-28 | End: 2023-07-03 | Stop reason: SDUPTHER

## 2023-03-28 RX ORDER — ATORVASTATIN CALCIUM 20 MG/1
TABLET, FILM COATED ORAL
Qty: 90 TABLET | Refills: 0 | Status: SHIPPED | OUTPATIENT
Start: 2023-03-28 | End: 2023-07-03 | Stop reason: SDUPTHER

## 2023-06-06 ENCOUNTER — PATIENT MESSAGE (OUTPATIENT)
Dept: HEALTH INFORMATION MANAGEMENT | Facility: OTHER | Age: 63
End: 2023-06-06

## 2023-06-06 ENCOUNTER — DOCUMENTATION (OUTPATIENT)
Dept: HEALTH INFORMATION MANAGEMENT | Facility: OTHER | Age: 63
End: 2023-06-06
Payer: COMMERCIAL

## 2023-07-19 SDOH — HEALTH STABILITY: MENTAL HEALTH
STRESS IS WHEN SOMEONE FEELS TENSE, NERVOUS, ANXIOUS, OR CAN'T SLEEP AT NIGHT BECAUSE THEIR MIND IS TROUBLED. HOW STRESSED ARE YOU?: NOT AT ALL

## 2023-07-19 SDOH — ECONOMIC STABILITY: HOUSING INSECURITY

## 2023-07-19 SDOH — ECONOMIC STABILITY: TRANSPORTATION INSECURITY
IN THE PAST 12 MONTHS, HAS LACK OF TRANSPORTATION KEPT YOU FROM MEETINGS, WORK, OR FROM GETTING THINGS NEEDED FOR DAILY LIVING?: PATIENT DECLINED

## 2023-07-19 SDOH — ECONOMIC STABILITY: INCOME INSECURITY: IN THE LAST 12 MONTHS, WAS THERE A TIME WHEN YOU WERE NOT ABLE TO PAY THE MORTGAGE OR RENT ON TIME?: PATIENT REFUSED

## 2023-07-19 SDOH — ECONOMIC STABILITY: HOUSING INSECURITY
IN THE LAST 12 MONTHS, WAS THERE A TIME WHEN YOU DID NOT HAVE A STEADY PLACE TO SLEEP OR SLEPT IN A SHELTER (INCLUDING NOW)?: PATIENT REFUSED

## 2023-07-19 SDOH — HEALTH STABILITY: PHYSICAL HEALTH: ON AVERAGE, HOW MANY MINUTES DO YOU ENGAGE IN EXERCISE AT THIS LEVEL?: 150+ MIN

## 2023-07-19 SDOH — ECONOMIC STABILITY: FOOD INSECURITY: WITHIN THE PAST 12 MONTHS, THE FOOD YOU BOUGHT JUST DIDN'T LAST AND YOU DIDN'T HAVE MONEY TO GET MORE.: PATIENT DECLINED

## 2023-07-19 SDOH — ECONOMIC STABILITY: TRANSPORTATION INSECURITY
IN THE PAST 12 MONTHS, HAS THE LACK OF TRANSPORTATION KEPT YOU FROM MEDICAL APPOINTMENTS OR FROM GETTING MEDICATIONS?: PATIENT DECLINED

## 2023-07-19 SDOH — ECONOMIC STABILITY: TRANSPORTATION INSECURITY
IN THE PAST 12 MONTHS, HAS LACK OF RELIABLE TRANSPORTATION KEPT YOU FROM MEDICAL APPOINTMENTS, MEETINGS, WORK OR FROM GETTING THINGS NEEDED FOR DAILY LIVING?: PATIENT DECLINED

## 2023-07-19 SDOH — HEALTH STABILITY: PHYSICAL HEALTH: ON AVERAGE, HOW MANY DAYS PER WEEK DO YOU ENGAGE IN MODERATE TO STRENUOUS EXERCISE (LIKE A BRISK WALK)?: 5 DAYS

## 2023-07-19 SDOH — ECONOMIC STABILITY: FOOD INSECURITY: WITHIN THE PAST 12 MONTHS, YOU WORRIED THAT YOUR FOOD WOULD RUN OUT BEFORE YOU GOT MONEY TO BUY MORE.: PATIENT DECLINED

## 2023-07-19 SDOH — ECONOMIC STABILITY: INCOME INSECURITY: HOW HARD IS IT FOR YOU TO PAY FOR THE VERY BASICS LIKE FOOD, HOUSING, MEDICAL CARE, AND HEATING?: PATIENT DECLINED

## 2023-07-19 ASSESSMENT — LIFESTYLE VARIABLES
HOW OFTEN DO YOU HAVE SIX OR MORE DRINKS ON ONE OCCASION: PATIENT DECLINED
HOW OFTEN DO YOU HAVE A DRINK CONTAINING ALCOHOL: PATIENT DECLINED
HOW MANY STANDARD DRINKS CONTAINING ALCOHOL DO YOU HAVE ON A TYPICAL DAY: PATIENT DECLINED
AUDIT-C TOTAL SCORE: -1
SKIP TO QUESTIONS 9-10: 0

## 2023-07-19 ASSESSMENT — SOCIAL DETERMINANTS OF HEALTH (SDOH)
HOW OFTEN DO YOU ATTEND CHURCH OR RELIGIOUS SERVICES?: NEVER
IN A TYPICAL WEEK, HOW MANY TIMES DO YOU TALK ON THE PHONE WITH FAMILY, FRIENDS, OR NEIGHBORS?: ONCE A WEEK
HOW OFTEN DO YOU ATTEND CHURCH OR RELIGIOUS SERVICES?: NEVER
HOW OFTEN DO YOU ATTENT MEETINGS OF THE CLUB OR ORGANIZATION YOU BELONG TO?: NEVER
HOW MANY DRINKS CONTAINING ALCOHOL DO YOU HAVE ON A TYPICAL DAY WHEN YOU ARE DRINKING: PATIENT DECLINED
HOW OFTEN DO YOU GET TOGETHER WITH FRIENDS OR RELATIVES?: PATIENT DECLINED
HOW OFTEN DO YOU HAVE A DRINK CONTAINING ALCOHOL: PATIENT DECLINED
HOW OFTEN DO YOU GET TOGETHER WITH FRIENDS OR RELATIVES?: PATIENT DECLINED
HOW HARD IS IT FOR YOU TO PAY FOR THE VERY BASICS LIKE FOOD, HOUSING, MEDICAL CARE, AND HEATING?: PATIENT DECLINED
WITHIN THE PAST 12 MONTHS, YOU WORRIED THAT YOUR FOOD WOULD RUN OUT BEFORE YOU GOT THE MONEY TO BUY MORE: PATIENT DECLINED
HOW OFTEN DO YOU ATTENT MEETINGS OF THE CLUB OR ORGANIZATION YOU BELONG TO?: NEVER
DO YOU BELONG TO ANY CLUBS OR ORGANIZATIONS SUCH AS CHURCH GROUPS UNIONS, FRATERNAL OR ATHLETIC GROUPS, OR SCHOOL GROUPS?: NO
HOW OFTEN DO YOU HAVE SIX OR MORE DRINKS ON ONE OCCASION: PATIENT DECLINED
DO YOU BELONG TO ANY CLUBS OR ORGANIZATIONS SUCH AS CHURCH GROUPS UNIONS, FRATERNAL OR ATHLETIC GROUPS, OR SCHOOL GROUPS?: NO
IN A TYPICAL WEEK, HOW MANY TIMES DO YOU TALK ON THE PHONE WITH FAMILY, FRIENDS, OR NEIGHBORS?: ONCE A WEEK

## 2023-07-20 ENCOUNTER — APPOINTMENT (OUTPATIENT)
Dept: MEDICAL GROUP | Facility: PHYSICIAN GROUP | Age: 63
End: 2023-07-20
Payer: COMMERCIAL

## 2023-07-24 ENCOUNTER — DOCUMENTATION (OUTPATIENT)
Dept: HEALTH INFORMATION MANAGEMENT | Facility: OTHER | Age: 63
End: 2023-07-24
Payer: COMMERCIAL

## 2023-08-09 ENCOUNTER — OFFICE VISIT (OUTPATIENT)
Dept: MEDICAL GROUP | Facility: PHYSICIAN GROUP | Age: 63
End: 2023-08-09
Payer: COMMERCIAL

## 2023-08-09 VITALS
OXYGEN SATURATION: 96 % | TEMPERATURE: 101.3 F | DIASTOLIC BLOOD PRESSURE: 56 MMHG | HEIGHT: 62 IN | SYSTOLIC BLOOD PRESSURE: 108 MMHG | HEART RATE: 92 BPM | BODY MASS INDEX: 26.81 KG/M2 | WEIGHT: 145.7 LBS

## 2023-08-09 DIAGNOSIS — E11.65 TYPE 2 DIABETES MELLITUS WITH HYPERGLYCEMIA, WITHOUT LONG-TERM CURRENT USE OF INSULIN (HCC): ICD-10-CM

## 2023-08-09 DIAGNOSIS — E11.59 HYPERTENSION ASSOCIATED WITH DIABETES (HCC): ICD-10-CM

## 2023-08-09 DIAGNOSIS — E78.5 DYSLIPIDEMIA ASSOCIATED WITH TYPE 2 DIABETES MELLITUS (HCC): ICD-10-CM

## 2023-08-09 DIAGNOSIS — M19.90 ARTHRITIS: ICD-10-CM

## 2023-08-09 DIAGNOSIS — I15.2 HYPERTENSION ASSOCIATED WITH DIABETES (HCC): ICD-10-CM

## 2023-08-09 DIAGNOSIS — E55.9 VITAMIN D DEFICIENCY: ICD-10-CM

## 2023-08-09 DIAGNOSIS — Z12.12 ENCOUNTER FOR SCREENING FOR COLORECTAL MALIGNANT NEOPLASM: ICD-10-CM

## 2023-08-09 DIAGNOSIS — Z13.29 SCREENING FOR THYROID DISORDER: ICD-10-CM

## 2023-08-09 DIAGNOSIS — Z12.11 ENCOUNTER FOR SCREENING FOR COLORECTAL MALIGNANT NEOPLASM: ICD-10-CM

## 2023-08-09 DIAGNOSIS — E11.69 DYSLIPIDEMIA ASSOCIATED WITH TYPE 2 DIABETES MELLITUS (HCC): ICD-10-CM

## 2023-08-09 PROBLEM — Z47.89 ORTHOPEDIC AFTERCARE: Status: RESOLVED | Noted: 2022-06-09 | Resolved: 2023-08-09

## 2023-08-09 PROCEDURE — 3074F SYST BP LT 130 MM HG: CPT | Performed by: NURSE PRACTITIONER

## 2023-08-09 PROCEDURE — 3078F DIAST BP <80 MM HG: CPT | Performed by: NURSE PRACTITIONER

## 2023-08-09 PROCEDURE — 99214 OFFICE O/P EST MOD 30 MIN: CPT | Performed by: NURSE PRACTITIONER

## 2023-08-09 RX ORDER — LISINOPRIL AND HYDROCHLOROTHIAZIDE 25; 20 MG/1; MG/1
1 TABLET ORAL DAILY
Qty: 90 TABLET | Refills: 3 | Status: SHIPPED | OUTPATIENT
Start: 2023-08-09

## 2023-08-09 RX ORDER — GLIPIZIDE 5 MG/1
5 TABLET ORAL 2 TIMES DAILY
Qty: 180 TABLET | Refills: 3 | Status: SHIPPED | OUTPATIENT
Start: 2023-08-09

## 2023-08-09 RX ORDER — MELOXICAM 15 MG/1
15 TABLET ORAL DAILY
Qty: 90 TABLET | Refills: 1 | Status: SHIPPED | OUTPATIENT
Start: 2023-08-09 | End: 2024-01-03

## 2023-08-09 RX ORDER — DAPAGLIFLOZIN 10 MG/1
10 TABLET, FILM COATED ORAL DAILY
Qty: 90 TABLET | Refills: 3 | Status: SHIPPED | OUTPATIENT
Start: 2023-08-09

## 2023-08-09 RX ORDER — DULAGLUTIDE 3 MG/.5ML
1 INJECTION, SOLUTION SUBCUTANEOUS
Qty: 6 ML | Refills: 3 | Status: SHIPPED | OUTPATIENT
Start: 2023-08-09 | End: 2024-02-09

## 2023-08-09 RX ORDER — ATORVASTATIN CALCIUM 20 MG/1
20 TABLET, FILM COATED ORAL DAILY
Qty: 90 TABLET | Refills: 3 | Status: SHIPPED | OUTPATIENT
Start: 2023-08-09

## 2023-08-09 NOTE — PROGRESS NOTES
Chief Complaint   Patient presents with    [facility]                                                                                                                                       HPI:   Tammy presents today with the following.    Problem   Dyslipidemia associated with type 2 diabetes mellitus (HCC)   Type 2 diabetes mellitus with hyperglycemia, without long-term current use of insulin (HCC)   Hypertension associated with diabetes (HCC)         Current Outpatient Medications   Medication Sig Dispense Refill    atorvastatin (LIPITOR) 20 MG Tab Take 1 Tablet by mouth every day. 90 Tablet 3    dapagliflozin propanediol (FARXIGA) 10 MG Tab Take 1 Tablet by mouth every day. 90 Tablet 3    Dulaglutide (TRULICITY) 3 MG/0.5ML Solution Pen-injector Inject 1 Syringe under the skin every 7 days. 6 mL 3    glipiZIDE (GLUCOTROL) 5 MG Tab Take 1 Tablet by mouth 2 times a day. 180 Tablet 3    lisinopril-hydrochlorothiazide (PRINZIDE) 20-25 MG per tablet Take 1 Tablet by mouth every day. 90 Tablet 3    meloxicam (MOBIC) 15 MG tablet Take 1 Tablet by mouth every day. 90 Tablet 1    metformin (GLUCOPHAGE) 1000 MG tablet Take 1 Tablet by mouth 2 times a day with meals. 180 Tablet 0    Vitamin D, Cholecalciferol, 1000 UNITS Cap Take  by mouth.      Cetirizine HCl (ZYRTEC ALLERGY PO) Take  by mouth.      Fish Oil Oil by Does not apply route.      POTASSIUM PO Take  by mouth.      Acetaminophen (TYLENOL PO) Take  by mouth.       No current facility-administered medications for this visit.       Allergies as of 08/09/2023 - Reviewed 08/09/2023   Allergen Reaction Noted    Januvia [sitagliptin] Unspecified 03/11/2015    Jardiance [empagliflozin]  03/27/2019    Sulfa drugs  06/03/2013    Tape  04/08/2016        ROS:  All systems negative expect as addressed in assessment and plan.     /56 (BP Location: Left arm, Patient Position: Sitting, BP Cuff Size: Adult)   Pulse 92   Temp (!) 38.5 °C (101.3 °F) (Temporal)   Ht  "1.575 m (5' 2\")   Wt 66.1 kg (145 lb 11.2 oz)   LMP 02/01/2016 Comment: tubal ligation 2002  SpO2 96%   BMI 26.65 kg/m²       Physical Exam  Vitals reviewed.   Constitutional:       Appearance: Normal appearance.   HENT:      Head: Normocephalic and atraumatic.      Mouth/Throat:      Mouth: Mucous membranes are moist.   Eyes:      Extraocular Movements: Extraocular movements intact.      Conjunctiva/sclera: Conjunctivae normal.   Pulmonary:      Effort: Pulmonary effort is normal.   Musculoskeletal:         General: Normal range of motion.      Cervical back: Normal range of motion.   Skin:     General: Skin is warm and dry.   Neurological:      General: No focal deficit present.      Mental Status: She is alert and oriented to person, place, and time.   Psychiatric:         Mood and Affect: Mood normal.         Behavior: Behavior normal.         Thought Content: Thought content normal.           Assessment and Plan:  63 y.o. female with the following issues.    1. Dyslipidemia associated with type 2 diabetes mellitus ([facility])  atorvastatin (LIPITOR) 20 MG Tab    Lipid Profile      2. Type 2 diabetes mellitus with hyperglycemia, without long-term current use of insulin ([facility])  dapagliflozin propanediol (FARXIGA) 10 MG Tab    Dulaglutide (TRULICITY) 3 MG/0.5ML Solution Pen-injector    Comp Metabolic Panel    MICROALBUMIN CREAT RATIO URINE    HEMOGLOBIN A1C      3. Hypertension associated with diabetes ([facility])  lisinopril-hydrochlorothiazide (PRINZIDE) 20-25 MG per tablet    Comp Metabolic Panel    MICROALBUMIN CREAT RATIO URINE    HEMOGLOBIN A1C      4. Vitamin D deficiency  VITAMIN D,25 HYDROXY (DEFICIENCY)      5. Screening for thyroid disorder  FREE THYROXINE    TSH      6. Arthritis  meloxicam (MOBIC) 15 MG tablet      7. Encounter for screening for colorectal malignant neoplasm  COLOGUARD (FIT DNA)           Type 2 diabetes mellitus with hyperglycemia, without long-term current use of insulin ([facility])  Chronic " stable condition.    Reviewed patient's labs from September 2022.    Current labs ordered.    Patient to continue Farxiga 10 mg daily, Trulicity 3 mg weekly, glipizide 5 mg twice daily and metformin 1000 mg twice daily.        Hypertension associated with diabetes (HCC)  Chronic stable.    Continue lisinopril-hydrochlorothiazide 20-25 mg daily.    Dyslipidemia associated with type 2 diabetes mellitus (HCC)  Chronic stable.    Continue atorvastatin 20 mg daily.      Return in about 6 months (around 2/9/2024) for Diabetes, HTN, High cholesterol.      Please note that this dictation was created using voice recognition software. I have worked with consultants from the vendor as well as technical experts from [facility] to optimize the interface. I have made every reasonable attempt to correct obvious errors, but I expect that there are errors of grammar and possibly content that I did not discover before finalizing the note.

## 2023-08-11 NOTE — ASSESSMENT & PLAN NOTE
Chronic stable condition.    Reviewed patient's labs from September 2022.    Current labs ordered.    Patient to continue Farxiga 10 mg daily, Trulicity 3 mg weekly, glipizide 5 mg twice daily and metformin 1000 mg twice daily.

## 2023-09-20 ENCOUNTER — OFFICE VISIT (OUTPATIENT)
Dept: URGENT CARE | Facility: PHYSICIAN GROUP | Age: 63
End: 2023-09-20
Payer: COMMERCIAL

## 2023-09-20 VITALS
DIASTOLIC BLOOD PRESSURE: 78 MMHG | TEMPERATURE: 99.1 F | SYSTOLIC BLOOD PRESSURE: 112 MMHG | OXYGEN SATURATION: 97 % | WEIGHT: 143 LBS | HEART RATE: 78 BPM | HEIGHT: 62 IN | RESPIRATION RATE: 16 BRPM | BODY MASS INDEX: 26.31 KG/M2

## 2023-09-20 DIAGNOSIS — U07.1 COVID-19 VIRUS INFECTION: ICD-10-CM

## 2023-09-20 LAB
FLUAV RNA SPEC QL NAA+PROBE: NEGATIVE
FLUBV RNA SPEC QL NAA+PROBE: NEGATIVE
RSV RNA SPEC QL NAA+PROBE: NEGATIVE
SARS-COV-2 RNA RESP QL NAA+PROBE: POSITIVE

## 2023-09-20 PROCEDURE — 3078F DIAST BP <80 MM HG: CPT | Performed by: PHYSICIAN ASSISTANT

## 2023-09-20 PROCEDURE — 3074F SYST BP LT 130 MM HG: CPT | Performed by: PHYSICIAN ASSISTANT

## 2023-09-20 PROCEDURE — 0241U POCT CEPHEID COV-2, FLU A/B, RSV - PCR: CPT | Performed by: PHYSICIAN ASSISTANT

## 2023-09-20 PROCEDURE — 99213 OFFICE O/P EST LOW 20 MIN: CPT | Performed by: PHYSICIAN ASSISTANT

## 2023-09-20 RX ORDER — PREDNISONE 10 MG/1
20 TABLET ORAL DAILY
Qty: 10 TABLET | Refills: 0 | Status: SHIPPED | OUTPATIENT
Start: 2023-09-20 | End: 2023-09-25

## 2023-09-20 RX ORDER — FLUTICASONE PROPIONATE 50 MCG
1 SPRAY, SUSPENSION (ML) NASAL DAILY
Qty: 16 G | Refills: 0 | Status: SHIPPED | OUTPATIENT
Start: 2023-09-20 | End: 2024-02-09

## 2023-09-20 RX ORDER — VITAMIN B COMPLEX
TABLET ORAL
COMMUNITY

## 2023-09-20 ASSESSMENT — ENCOUNTER SYMPTOMS
ABDOMINAL PAIN: 0
CONSTIPATION: 0
DIZZINESS: 0
COUGH: 0
FEVER: 1
WHEEZING: 0
VOMITING: 0
SHORTNESS OF BREATH: 0
DIARRHEA: 0
EYE REDNESS: 0
NAUSEA: 0
CHILLS: 0
EYE DISCHARGE: 0
EYE PAIN: 0
SINUS PAIN: 1
SORE THROAT: 1
HEADACHES: 0
DIAPHORESIS: 0

## 2023-09-20 NOTE — PROGRESS NOTES
"  Subjective:     Tammy Alejo  is a 63 y.o. female who presents for Sinusitis (X 3 days.)       She presents today with sinus congestion and sinus pain has been ongoing for the last 3 days.  Has associated sore throat.  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  She notes subjective fever.  At this time she denies chest pain, shortness of breath, no nausea vomiting, no abdominal pain, no diarrhea.  Has been using over-the-counter medications for symptoms.       Review of Systems   Constitutional:  Positive for fever. Negative for chills, diaphoresis and malaise/fatigue.   HENT:  Positive for congestion, sinus pain and sore throat. Negative for ear discharge.    Eyes:  Negative for pain, discharge and redness.   Respiratory:  Negative for cough, shortness of breath and wheezing.    Cardiovascular:  Negative for chest pain.   Gastrointestinal:  Negative for abdominal pain, constipation, diarrhea, nausea and vomiting.   Neurological:  Negative for dizziness and headaches.      Allergies   Allergen Reactions    Januvia [Sitagliptin] Unspecified     Headaches, nausea, vomiting    Jardiance [Empagliflozin]      Constipation, rash, palpitations, dizziness    Sulfa Drugs     Tape      Past Medical History:   Diagnosis Date    Allergy     otc Allertec    Arrhythmia     Arthritis     OA    Diabetes     Diabetic neuropathy (HCC)     Dyslipidemia associated with type 2 diabetes mellitus (HCC) 2/10/2016    Heart murmur     Hypertension         Objective:   /78 (BP Location: Left arm, Patient Position: Sitting, BP Cuff Size: Adult)   Pulse 78   Temp 37.3 °C (99.1 °F) (Temporal)   Resp 16   Ht 1.575 m (5' 2\")   Wt 64.9 kg (143 lb)   LMP 02/01/2016 Comment: tubal ligation 2002  SpO2 97%   BMI 26.16 kg/m²   Physical Exam  Vitals and nursing note reviewed.   Constitutional:       General: She is not in acute " distress.     Appearance: Normal appearance. She is not ill-appearing, toxic-appearing or diaphoretic.   HENT:      Head: Normocephalic.      Right Ear: Tympanic membrane, ear canal and external ear normal. There is no impacted cerumen.      Left Ear: Tympanic membrane, ear canal and external ear normal. There is no impacted cerumen.      Nose: Congestion present. No rhinorrhea.      Mouth/Throat:      Mouth: Mucous membranes are moist.      Pharynx: No oropharyngeal exudate or posterior oropharyngeal erythema.   Eyes:      General:         Right eye: No discharge.         Left eye: No discharge.      Conjunctiva/sclera: Conjunctivae normal.   Cardiovascular:      Rate and Rhythm: Normal rate and regular rhythm.   Pulmonary:      Effort: Pulmonary effort is normal. No respiratory distress.      Breath sounds: Normal breath sounds. No stridor. No wheezing or rhonchi.   Musculoskeletal:      Cervical back: Neck supple.   Lymphadenopathy:      Cervical: No cervical adenopathy.   Neurological:      General: No focal deficit present.      Mental Status: She is alert and oriented to person, place, and time.   Psychiatric:         Mood and Affect: Mood normal.         Behavior: Behavior normal.         Thought Content: Thought content normal.         Judgment: Judgment normal.             Diagnostic testing:    Cephid COVID/Influenza/RSV -positive for COVID-19, all others negative, notified via [employer] message    Assessment/Plan:     Encounter Diagnoses   Name Primary?    COVID-19 virus infection           Plan for care for today's complaint includes starting patient on Flonase and prednisone for COVID-19 infection.  Vital signs are stable patient is well-appearing today.  Current CDC guidelines for self-isolation were discussed.  Continue to monitor symptoms and return to urgent care or follow-up with primary care provider if symptoms remain ongoing.  Follow-up in the emergency department if symptoms become severe, ER  precautions discussed in office today..  Prescription for Flonase, prednisone provided.    See AVS Instructions below for written guidance provided to patient on after-visit management and care in addition to our verbal discussion during the visit.    Please note that this dictation was created using voice recognition software. I have attempted to correct all errors, but there may be sound-alike, spelling, grammar and possibly content errors that I did not discover before finalizing the note.    Brooksashley Liang PA-C

## 2023-09-22 ENCOUNTER — OFFICE VISIT (OUTPATIENT)
Dept: URGENT CARE | Facility: PHYSICIAN GROUP | Age: 63
End: 2023-09-22
Payer: COMMERCIAL

## 2023-09-22 VITALS
BODY MASS INDEX: 26.68 KG/M2 | OXYGEN SATURATION: 98 % | RESPIRATION RATE: 16 BRPM | DIASTOLIC BLOOD PRESSURE: 74 MMHG | HEART RATE: 80 BPM | TEMPERATURE: 98.6 F | SYSTOLIC BLOOD PRESSURE: 108 MMHG | WEIGHT: 145 LBS | HEIGHT: 62 IN

## 2023-09-22 DIAGNOSIS — U07.1 COVID-19 VIRUS INFECTION: ICD-10-CM

## 2023-09-22 PROCEDURE — 99214 OFFICE O/P EST MOD 30 MIN: CPT | Performed by: PHYSICIAN ASSISTANT

## 2023-09-22 PROCEDURE — 3074F SYST BP LT 130 MM HG: CPT | Performed by: PHYSICIAN ASSISTANT

## 2023-09-22 PROCEDURE — 3078F DIAST BP <80 MM HG: CPT | Performed by: PHYSICIAN ASSISTANT

## 2023-09-22 RX ORDER — ALBUTEROL SULFATE 90 UG/1
2 AEROSOL, METERED RESPIRATORY (INHALATION) EVERY 6 HOURS PRN
Qty: 8.5 G | Refills: 0 | Status: SHIPPED | OUTPATIENT
Start: 2023-09-22 | End: 2024-02-09

## 2023-09-22 ASSESSMENT — ENCOUNTER SYMPTOMS
SORE THROAT: 1
MYALGIAS: 1
WHEEZING: 1
SHORTNESS OF BREATH: 1
CHILLS: 1
HEADACHES: 1
FEVER: 1
COUGH: 1

## 2023-09-22 NOTE — PROGRESS NOTES
Subjective     Tammy Alejo is a 63 y.o. female who presents with Cough (Congested,Fever,[id number])    HPI:  Tammy Alejo is a 63 y.o. female who presents today for COVID.  Patient states that he started the symptoms on Monday.  Wednesday she came in for evaluation and tested positive for COVID-19 virus.  She was prescribed prednisone but was not given any prescription for antiviral.  Patient reports that she continues to have no improvement in her symptoms.  She has had persistent congestion, low-grade fever, headache, cough, intermittent wheezing.  She is requesting antiviral medication.        Review of Systems   Constitutional:  Positive for chills, fever and malaise/fatigue.   HENT:  Positive for congestion and sore throat. Negative for ear pain.    Respiratory:  Positive for cough, shortness of breath and wheezing.    Cardiovascular:  Negative for chest pain.   Musculoskeletal:  Positive for myalgias.   Neurological:  Positive for headaches.             PMH:  has a past medical history of Allergy, Arrhythmia, Arthritis, Diabetes, Diabetic neuropathy (HCC), Dyslipidemia associated with type 2 diabetes mellitus (HCC) (2/10/2016), Heart murmur, and Hypertension.  MEDS:   Current Outpatient Medications:     Nirmatrelvir&Ritonavir 300/100 20 x 150 MG & 10 x 100MG Tablet Therapy Pack, Take 300 mg nirmatrelvir (two 150 mg tablets) with 100 mg ritonavir (one 100 mg tablet) by mouth, with all three tablets taken together twice daily for 5 days., Disp: 30 Each, Rfl: 0    albuterol 108 (90 Base) MCG/ACT Aero Soln inhalation aerosol, Inhale 2 Puffs every 6 hours as needed for Shortness of Breath., Disp: 8.5 g, Rfl: 0    Coenzyme Q10 (COQ10) 100 MG Cap, Take  by mouth., Disp: , Rfl:     predniSONE (DELTASONE) 10 MG Tab, Take 2 Tablets by mouth every day for 5 days., Disp: 10 Tablet, Rfl: 0    fluticasone (FLONASE) 50 MCG/ACT nasal spray, Administer 1 Spray into affected nostril(S) every day.,  Disp: 16 g, Rfl: 0    atorvastatin (LIPITOR) 20 MG Tab, Take 1 Tablet by mouth every day., Disp: 90 Tablet, Rfl: 3    dapagliflozin propanediol (FARXIGA) 10 MG Tab, Take 1 Tablet by mouth every day., Disp: 90 Tablet, Rfl: 3    Dulaglutide (TRULICITY) 3 MG/0.5ML Solution Pen-injector, Inject 1 Syringe under the skin every 7 days., Disp: 6 mL, Rfl: 3    glipiZIDE (GLUCOTROL) 5 MG Tab, Take 1 Tablet by mouth 2 times a day., Disp: 180 Tablet, Rfl: 3    lisinopril-hydrochlorothiazide (PRINZIDE) 20-25 MG per tablet, Take 1 Tablet by mouth every day., Disp: 90 Tablet, Rfl: 3    meloxicam (MOBIC) 15 MG tablet, Take 1 Tablet by mouth every day., Disp: 90 Tablet, Rfl: 1    metformin (GLUCOPHAGE) 1000 MG tablet, Take 1 Tablet by mouth 2 times a day with meals., Disp: 180 Tablet, Rfl: 0    Vitamin D, Cholecalciferol, 1000 UNITS Cap, Take  by mouth., Disp: , Rfl:     Cetirizine HCl (ZYRTEC ALLERGY PO), Take  by mouth., Disp: , Rfl:     Fish Oil Oil, by Does not apply route., Disp: , Rfl:     POTASSIUM PO, Take  by mouth., Disp: , Rfl:     Acetaminophen (TYLENOL PO), Take  by mouth., Disp: , Rfl:   ALLERGIES:   Allergies   Allergen Reactions    Januvia [Sitagliptin] Unspecified     Headaches, nausea, vomiting    Jardiance [Empagliflozin]      Constipation, rash, palpitations, dizziness    Sulfa Drugs     Tape      SURGHX:   Past Surgical History:   Procedure Laterality Date    MO NEUROPLASTY & OR TRANSPOS MEDIAN NRV CARPAL FELY Left 6/9/2022    Procedure: LEFT OPEN CARPAL TUNNEL RELEASE;  Surgeon: Anabella Schmidt M.D.;  Location: [facility] [place];  Service: Orthopedics    CATARACT PHACO WITH IOL Left 4/26/2016    Procedure: CATARACT PHACO WITH IOL;  Surgeon: Yogesh Lennon M.D.;  Location: [facility];  Service:     CATARACT PHACO WITH IOL Right 4/12/2016    Procedure: CATARACT PHACO WITH IOL;  Surgeon: Yogesh Lennon M.D.;  Location: [facility];  Service:     CALCANEUS ORIF       "CARPAL TUNNEL ENDOSCOPIC      DENTAL EXTRACTION(S)      PRIMARY C SECTION      TUBAL COAGULATION LAPAROSCOPIC BILATERAL       SOCHX:  reports that she has never smoked. She has never used smokeless tobacco. She reports that she does not drink alcohol and does not use drugs.  FH: Family history was reviewed, no pertinent findings to report        Objective     /74 (BP Location: Right arm, Patient Position: Sitting, BP Cuff Size: Adult)   Pulse 80   Temp 37 °C (98.6 °F) (Temporal)   Resp 16   Ht 1.575 m (5' 2\")   Wt 65.8 kg (145 lb)   LMP 02/01/2016 Comment: tubal ligation 2002  SpO2 98%   BMI 26.52 kg/m²      Physical Exam  Constitutional:       Appearance: She is well-developed.   HENT:      Head: Normocephalic and atraumatic.      Right Ear: Tympanic membrane, ear canal and external ear normal.      Left Ear: Tympanic membrane, ear canal and external ear normal.      Nose: Mucosal edema, congestion and rhinorrhea present. Rhinorrhea is clear.      Mouth/Throat:      Lips: Pink.      Mouth: Mucous membranes are moist.      Pharynx: Oropharynx is clear.   Eyes:      Conjunctiva/sclera: Conjunctivae normal.      Pupils: Pupils are equal, round, and reactive to light.   Cardiovascular:      Rate and Rhythm: Normal rate and regular rhythm.      Heart sounds: Normal heart sounds. No murmur heard.  Pulmonary:      Effort: Pulmonary effort is normal.      Breath sounds: Normal breath sounds. No decreased breath sounds, wheezing, rhonchi or rales.   Musculoskeletal:      Cervical back: Normal range of motion.   Lymphadenopathy:      Cervical: No cervical adenopathy.   Skin:     General: Skin is warm and dry.      Capillary Refill: Capillary refill takes less than 2 seconds.   Neurological:      Mental Status: She is alert and oriented to person, place, and time.   Psychiatric:         Behavior: Behavior normal.         Judgment: Judgment normal.           Assessment & Plan     1. COVID-19 virus infection  - " Nirmatrelvir&Ritonavir 300/100 20 x 150 MG & 10 x 100MG Tablet Therapy Pack; Take 300 mg nirmatrelvir (two 150 mg tablets) with 100 mg ritonavir (one 100 mg tablet) by mouth, with all three tablets taken together twice daily for 5 days.  Dispense: 30 Each; Refill: 0  - albuterol 108 (90 Base) MCG/ACT Aero Soln inhalation aerosol; Inhale 2 Puffs every 6 hours as needed for Shortness of Breath.  Dispense: 8.5 g; Refill: 0  - OTC cold/flu medications  -Supportive care also discussed to include the use of saline nasal rinses, steam inhalation, and the use of a cool-mist humidifier in the bedroom at night.  - PO fluids  - Rest  - Tylenol or ibuprofen as needed for fever > 100.4 F  *Discussed with patient that she needs to wait a full 12 hours of her last dose of atorvastatin before starting the Paxlovid.  She should then stay off of the atorvastatin for the full 5 days that she is on the antiviral medication and should not restart it for an additional 5 days afterwards.  -Self-isolation instructions discussed            Differential Diagnosis, natural history, and supportive care discussed. Return to the Urgent Care or follow up with your PCP if symptoms fail to resolve, or for any new or worsening symptoms. Emergency room precautions discussed. Patient and/or family appears understanding of information.

## 2023-12-28 DIAGNOSIS — M19.90 ARTHRITIS: ICD-10-CM

## 2024-01-03 RX ORDER — MELOXICAM 15 MG/1
15 TABLET ORAL DAILY
Qty: 90 TABLET | Refills: 0 | Status: SHIPPED | OUTPATIENT
Start: 2024-01-03 | End: 2024-02-09 | Stop reason: SDUPTHER

## 2024-02-09 ENCOUNTER — OFFICE VISIT (OUTPATIENT)
Dept: MEDICAL GROUP | Facility: PHYSICIAN GROUP | Age: 64
End: 2024-02-09
Payer: COMMERCIAL

## 2024-02-09 VITALS
DIASTOLIC BLOOD PRESSURE: 62 MMHG | OXYGEN SATURATION: 97 % | WEIGHT: 146.6 LBS | RESPIRATION RATE: 16 BRPM | SYSTOLIC BLOOD PRESSURE: 108 MMHG | BODY MASS INDEX: 26.98 KG/M2 | HEIGHT: 62 IN | TEMPERATURE: 98.1 F | HEART RATE: 85 BPM

## 2024-02-09 DIAGNOSIS — I15.2 HYPERTENSION ASSOCIATED WITH DIABETES (HCC): ICD-10-CM

## 2024-02-09 DIAGNOSIS — E11.69 DYSLIPIDEMIA ASSOCIATED WITH TYPE 2 DIABETES MELLITUS (HCC): ICD-10-CM

## 2024-02-09 DIAGNOSIS — G89.29 CHRONIC PAIN OF BOTH SHOULDERS: ICD-10-CM

## 2024-02-09 DIAGNOSIS — M25.512 CHRONIC PAIN OF BOTH SHOULDERS: ICD-10-CM

## 2024-02-09 DIAGNOSIS — M25.511 CHRONIC PAIN OF BOTH SHOULDERS: ICD-10-CM

## 2024-02-09 DIAGNOSIS — E11.59 HYPERTENSION ASSOCIATED WITH DIABETES (HCC): ICD-10-CM

## 2024-02-09 DIAGNOSIS — E11.65 TYPE 2 DIABETES MELLITUS WITH HYPERGLYCEMIA, WITHOUT LONG-TERM CURRENT USE OF INSULIN (HCC): ICD-10-CM

## 2024-02-09 DIAGNOSIS — E55.9 VITAMIN D INSUFFICIENCY: ICD-10-CM

## 2024-02-09 DIAGNOSIS — M19.90 ARTHRITIS: ICD-10-CM

## 2024-02-09 DIAGNOSIS — E78.5 DYSLIPIDEMIA ASSOCIATED WITH TYPE 2 DIABETES MELLITUS (HCC): ICD-10-CM

## 2024-02-09 DIAGNOSIS — Z13.29 SCREENING FOR THYROID DISORDER: ICD-10-CM

## 2024-02-09 LAB
HBA1C MFR BLD: 7.3 % (ref ?–5.8)
POCT INT CON NEG: NEGATIVE
POCT INT CON POS: POSITIVE

## 2024-02-09 PROCEDURE — 3078F DIAST BP <80 MM HG: CPT | Performed by: NURSE PRACTITIONER

## 2024-02-09 PROCEDURE — 99214 OFFICE O/P EST MOD 30 MIN: CPT | Performed by: NURSE PRACTITIONER

## 2024-02-09 PROCEDURE — 83036 HEMOGLOBIN GLYCOSYLATED A1C: CPT | Performed by: NURSE PRACTITIONER

## 2024-02-09 PROCEDURE — 3074F SYST BP LT 130 MM HG: CPT | Performed by: NURSE PRACTITIONER

## 2024-02-09 RX ORDER — MELOXICAM 15 MG/1
15 TABLET ORAL DAILY
Qty: 90 TABLET | Refills: 0 | Status: SHIPPED | OUTPATIENT
Start: 2024-02-09

## 2024-02-09 ASSESSMENT — PATIENT HEALTH QUESTIONNAIRE - PHQ9: CLINICAL INTERPRETATION OF PHQ2 SCORE: 0

## 2024-02-09 NOTE — PROGRESS NOTES
"Family Nurse Practitioner Student Note    Cosigner/Preceptor: DARSHAN Candelaria    Chief Complaint: diabetes follow up                                                                                                                                   HPI:   Tammy presents today with the following.    Current Outpatient Medications   Medication Sig Dispense Refill    Dulaglutide 4.5 MG/0.5ML Solution Pen-injector Inject 0.5 mL under the skin every 7 days. 6 mL 3    meloxicam (MOBIC) 15 MG tablet Take 1 Tablet by mouth every day. 90 Tablet 0    metformin (GLUCOPHAGE) 1000 MG tablet TAKE 1 TABLET BY MOUTH TWICE DAILY WITH MEALS 180 Tablet 0    Coenzyme Q10 (COQ10) 100 MG Cap Take  by mouth.      atorvastatin (LIPITOR) 20 MG Tab Take 1 Tablet by mouth every day. 90 Tablet 3    dapagliflozin propanediol (FARXIGA) 10 MG Tab Take 1 Tablet by mouth every day. 90 Tablet 3    glipiZIDE (GLUCOTROL) 5 MG Tab Take 1 Tablet by mouth 2 times a day. 180 Tablet 3    lisinopril-hydrochlorothiazide (PRINZIDE) 20-25 MG per tablet Take 1 Tablet by mouth every day. 90 Tablet 3    Vitamin D, Cholecalciferol, 1000 UNITS Cap Take  by mouth.      Cetirizine HCl (ZYRTEC ALLERGY PO) Take  by mouth.      Fish Oil Oil by Does not apply route.      POTASSIUM PO Take  by mouth.      Acetaminophen (TYLENOL PO) Take  by mouth.       No current facility-administered medications for this visit.       Allergies as of 02/09/2024 - Reviewed 02/09/2024   Allergen Reaction Noted    Januvia [sitagliptin] Unspecified 03/11/2015    Jardiance [empagliflozin]  03/27/2019    Sulfa drugs  06/03/2013    Tape  04/08/2016        ROS:  All systems negative expect as addressed in assessment and plan.     /62 (BP Location: Left arm, Patient Position: Sitting, BP Cuff Size: Adult)   Pulse 85   Temp 36.7 °C (98.1 °F) (Temporal)   Resp 16   Ht 1.575 m (5' 2\")   Wt 66.5 kg (146 lb 9.6 oz)   LMP 02/01/2016 Comment: tubal ligation 2002  SpO2 97%   BMI 26.81 " kg/m²     Physical Exam  Vitals reviewed.   Constitutional:       Appearance: Normal appearance.   HENT:      Head: Normocephalic.   Cardiovascular:      Rate and Rhythm: Normal rate.   Pulmonary:      Effort: Pulmonary effort is normal.   Musculoskeletal:         General: Normal range of motion.      Cervical back: Normal range of motion.   Skin:     General: Skin is warm.   Neurological:      General: No focal deficit present.      Mental Status: She is alert and oriented to person, place, and time. Mental status is at baseline.   Psychiatric:         Mood and Affect: Mood normal.         Behavior: Behavior normal.         Thought Content: Thought content normal.         Judgment: Judgment normal.         Assessment and Plan:  63 y.o. female with the following issues.      Problem List Items Addressed This Visit    1. Type 2 diabetes mellitus with hyperglycemia, without long-term current use of insulin ([facility])  Chronic unchanged. Patient's A1c in clinic today is 7.3%. Discussed increasing dulaglutide dose as this should help.     Continue metformin 1000 mg BID, dapagliflozin propanediol 10 mg daily, glipizide 5 mg daily and increase the dulaglutide to 4.5 mg weekly.    2. Chronic pain of both shoulders  Chronic unstable. Patient reports that she has arthritis in both shoulders. At this time she is requesting a referral to orthopedics to discuss shoulder replacements.     Referral to orthopedics placed. Continue meloxicam 15 mg daily.    3. Arthritis    4. Dyslipidemia associated with type 2 diabetes mellitus (HCC)  Chronic stable. Patient denies myalgias.     Continue atorvastatin 20 mg daily. Lab orders placed.     5. Hypertension associated with diabetes ([facility])  Chronic stable. Patient states she rarely has a high blood pressure reading. Today in office BP is 108/62. Denies headaches or chest pain.     Continue lisinopril-hydrochlorothiazide 20-25 mg daily.    6. Vitamin D insufficiency    7. Screening for thyroid  disorder      Return in about 6 months (around 8/9/2024) for diabetes/HTN/HLD.

## 2024-03-28 ENCOUNTER — OFFICE VISIT (OUTPATIENT)
Dept: URGENT CARE | Facility: PHYSICIAN GROUP | Age: 64
End: 2024-03-28
Payer: COMMERCIAL

## 2024-03-28 VITALS
WEIGHT: 142 LBS | DIASTOLIC BLOOD PRESSURE: 70 MMHG | RESPIRATION RATE: 18 BRPM | HEIGHT: 62 IN | OXYGEN SATURATION: 99 % | TEMPERATURE: 97.7 F | SYSTOLIC BLOOD PRESSURE: 112 MMHG | BODY MASS INDEX: 26.13 KG/M2 | HEART RATE: 90 BPM

## 2024-03-28 DIAGNOSIS — B96.89 ACUTE BACTERIAL SINUSITIS: ICD-10-CM

## 2024-03-28 DIAGNOSIS — J01.90 ACUTE BACTERIAL SINUSITIS: ICD-10-CM

## 2024-03-28 PROCEDURE — 3074F SYST BP LT 130 MM HG: CPT | Performed by: PHYSICIAN ASSISTANT

## 2024-03-28 PROCEDURE — 99213 OFFICE O/P EST LOW 20 MIN: CPT | Performed by: PHYSICIAN ASSISTANT

## 2024-03-28 PROCEDURE — 3078F DIAST BP <80 MM HG: CPT | Performed by: PHYSICIAN ASSISTANT

## 2024-03-28 RX ORDER — AMOXICILLIN AND CLAVULANATE POTASSIUM 875; 125 MG/1; MG/1
1 TABLET, FILM COATED ORAL 2 TIMES DAILY
Qty: 14 TABLET | Refills: 0 | Status: SHIPPED | OUTPATIENT
Start: 2024-03-28 | End: 2024-04-04

## 2024-03-28 ASSESSMENT — ENCOUNTER SYMPTOMS
DIARRHEA: 0
ABDOMINAL PAIN: 0
COUGH: 0
VOMITING: 0
DIZZINESS: 0
NAUSEA: 0
EYE PAIN: 0
EYE DISCHARGE: 0
SINUS PAIN: 1
EYE REDNESS: 0
HEADACHES: 0
CONSTIPATION: 0
FEVER: 0
SORE THROAT: 0
CHILLS: 0
WHEEZING: 0
SHORTNESS OF BREATH: 0
DIAPHORESIS: 0

## 2024-03-28 NOTE — PROGRESS NOTES
"  Subjective:     Tammy Alejo  is a 63 y.o. female who presents for Sinusitis (X 7 days)       She presents today with sinus pain and pressure that has been ongoing over the past 7 days.  Symptoms have been worsening since onset.  She describes the pain as being above the eyebrows, worsened when she bends over.  Denies any ear pain.  Denies fever/chills/sweats, chest pain, shortness of breath, nausea/vomiting, abdominal pain, diarrhea.       Review of Systems   Constitutional:  Negative for chills, diaphoresis, fever and malaise/fatigue.   HENT:  Positive for congestion and sinus pain. Negative for ear discharge and sore throat.    Eyes:  Negative for pain, discharge and redness.   Respiratory:  Negative for cough, shortness of breath and wheezing.    Cardiovascular:  Negative for chest pain.   Gastrointestinal:  Negative for abdominal pain, constipation, diarrhea, nausea and vomiting.   Neurological:  Negative for dizziness and headaches.      Allergies   Allergen Reactions    Januvia [Sitagliptin] Unspecified     Headaches, nausea, vomiting    Jardiance [Empagliflozin]      Constipation, rash, palpitations, dizziness    Sulfa Drugs     Tape      Past Medical History:   Diagnosis Date    Allergy     otc Allertec    Arrhythmia     Arthritis     OA    Diabetes     Diabetic neuropathy (HCC)     Dyslipidemia associated with type 2 diabetes mellitus (HCC) 2/10/2016    Heart murmur     Hypertension         Objective:   /70 (BP Location: Right arm, Patient Position: Sitting, BP Cuff Size: Adult)   Pulse 90   Temp 36.5 °C (97.7 °F) (Temporal)   Resp 18   Ht 1.575 m (5' 2\")   Wt 64.4 kg (142 lb)   LMP 02/01/2016 Comment: tubal ligation 2002  SpO2 99%   BMI 25.97 kg/m²   Physical Exam  Vitals and nursing note reviewed.   Constitutional:       General: She is not in acute distress.     Appearance: Normal appearance. She is not ill-appearing, toxic-appearing or diaphoretic.   HENT:      Head: " Normocephalic.      Right Ear: Tympanic membrane, ear canal and external ear normal. There is no impacted cerumen.      Left Ear: Tympanic membrane, ear canal and external ear normal. There is no impacted cerumen.      Nose: Congestion present. No rhinorrhea.      Mouth/Throat:      Mouth: Mucous membranes are moist.      Pharynx: No oropharyngeal exudate or posterior oropharyngeal erythema.   Eyes:      General:         Right eye: No discharge.         Left eye: No discharge.      Conjunctiva/sclera: Conjunctivae normal.   Cardiovascular:      Rate and Rhythm: Normal rate and regular rhythm.   Pulmonary:      Effort: Pulmonary effort is normal. No respiratory distress.      Breath sounds: Normal breath sounds. No stridor. No wheezing or rhonchi.   Musculoskeletal:      Cervical back: Neck supple.   Lymphadenopathy:      Cervical: No cervical adenopathy.   Neurological:      General: No focal deficit present.      Mental Status: She is alert and oriented to person, place, and time.   Psychiatric:         Mood and Affect: Mood normal.         Behavior: Behavior normal.         Thought Content: Thought content normal.         Judgment: Judgment normal.             Diagnostic testing: None    Assessment/Plan:     Encounter Diagnoses   Name Primary?    Acute bacterial sinusitis           Plan for care for today's complaint includes start the patient on Augmentin for acute bacterial sinusitis.  Begin to use Flonase for additional sinusitis symptom support.  Continue to monitor symptoms and return to urgent care or follow-up with primary care provider if symptoms remain ongoing.  Follow-up in the emergency department if symptoms become severe, ER precautions discussed in office today..  Prescription for Augmentin, Flonase provided.    See AVS Instructions below for written guidance provided to patient on after-visit management and care in addition to our verbal discussion during the visit.    Please note that this  dictation was created using voice recognition software. I have attempted to correct all errors, but there may be sound-alike, spelling, grammar and possibly content errors that I did not discover before finalizing the note.    Manuelito Liang PA-C

## 2024-04-01 NOTE — TELEPHONE ENCOUNTER
Received request via: Patient    Was the patient seen in the last year in this department? Yes    Does the patient have an active prescription (recently filled or refills available) for medication(s) requested? No    Pharmacy Name: [facility]    Does the patient have USP Plus and need 100 day supply (blood pressure, diabetes and cholesterol meds only)? Patient does not have SCP

## 2024-05-07 DIAGNOSIS — M19.90 ARTHRITIS: ICD-10-CM

## 2024-05-09 NOTE — TELEPHONE ENCOUNTER
Received request via: Patient    Was the patient seen in the last year in this department? Yes    Does the patient have an active prescription (recently filled or refills available) for medication(s) requested? No    Pharmacy Name: [facility]    Does the patient have nursing home Plus and need 100 day supply (blood pressure, diabetes and cholesterol meds only)? Patient does not have SCP

## 2024-05-10 RX ORDER — MELOXICAM 15 MG/1
15 TABLET ORAL DAILY
Qty: 90 TABLET | Refills: 0 | Status: SHIPPED | OUTPATIENT
Start: 2024-05-10

## 2024-05-30 DIAGNOSIS — E11.65 TYPE 2 DIABETES MELLITUS WITH HYPERGLYCEMIA, WITHOUT LONG-TERM CURRENT USE OF INSULIN (HCC): ICD-10-CM

## 2024-06-29 DIAGNOSIS — E11.65 TYPE 2 DIABETES MELLITUS WITH HYPERGLYCEMIA, WITHOUT LONG-TERM CURRENT USE OF INSULIN (HCC): ICD-10-CM

## 2024-07-01 RX ORDER — DULAGLUTIDE 4.5 MG/.5ML
0.5 INJECTION, SOLUTION SUBCUTANEOUS
Qty: 4 ML | Refills: 0 | OUTPATIENT
Start: 2024-07-01

## 2024-07-08 DIAGNOSIS — E11.65 TYPE 2 DIABETES MELLITUS WITH HYPERGLYCEMIA, WITHOUT LONG-TERM CURRENT USE OF INSULIN (HCC): ICD-10-CM

## 2024-07-10 RX ORDER — DAPAGLIFLOZIN 10 MG/1
10 TABLET, FILM COATED ORAL DAILY
Qty: 90 TABLET | Refills: 0 | Status: SHIPPED | OUTPATIENT
Start: 2024-07-10

## 2024-08-13 ENCOUNTER — HOSPITAL ENCOUNTER (OUTPATIENT)
Dept: LAB | Facility: MEDICAL CENTER | Age: 64
End: 2024-08-13
Attending: NURSE PRACTITIONER
Payer: COMMERCIAL

## 2024-08-13 DIAGNOSIS — E11.65 TYPE 2 DIABETES MELLITUS WITH HYPERGLYCEMIA, WITHOUT LONG-TERM CURRENT USE OF INSULIN (HCC): ICD-10-CM

## 2024-08-13 DIAGNOSIS — Z13.29 SCREENING FOR THYROID DISORDER: ICD-10-CM

## 2024-08-13 DIAGNOSIS — E11.69 DYSLIPIDEMIA ASSOCIATED WITH TYPE 2 DIABETES MELLITUS (HCC): ICD-10-CM

## 2024-08-13 DIAGNOSIS — I15.2 HYPERTENSION ASSOCIATED WITH DIABETES (HCC): ICD-10-CM

## 2024-08-13 DIAGNOSIS — E78.5 DYSLIPIDEMIA ASSOCIATED WITH TYPE 2 DIABETES MELLITUS (HCC): ICD-10-CM

## 2024-08-13 DIAGNOSIS — E55.9 VITAMIN D INSUFFICIENCY: ICD-10-CM

## 2024-08-13 DIAGNOSIS — E11.59 HYPERTENSION ASSOCIATED WITH DIABETES (HCC): ICD-10-CM

## 2024-08-13 LAB
25(OH)D3 SERPL-MCNC: 55 NG/ML (ref 30–100)
ALBUMIN SERPL BCP-MCNC: 4.4 G/DL (ref 3.2–4.9)
ALBUMIN/GLOB SERPL: 1.8 G/DL
ALP SERPL-CCNC: 66 U/L (ref 30–99)
ALT SERPL-CCNC: 20 U/L (ref 2–50)
ANION GAP SERPL CALC-SCNC: 15 MMOL/L (ref 7–16)
AST SERPL-CCNC: 17 U/L (ref 12–45)
BILIRUB SERPL-MCNC: 1.2 MG/DL (ref 0.1–1.5)
BUN SERPL-MCNC: 21 MG/DL (ref 8–22)
CALCIUM ALBUM COR SERPL-MCNC: 8.9 MG/DL (ref 8.5–10.5)
CALCIUM SERPL-MCNC: 9.2 MG/DL (ref 8.5–10.5)
CHLORIDE SERPL-SCNC: 96 MMOL/L (ref 96–112)
CHOLEST SERPL-MCNC: 126 MG/DL (ref 100–199)
CO2 SERPL-SCNC: 25 MMOL/L (ref 20–33)
CREAT SERPL-MCNC: 0.86 MG/DL (ref 0.5–1.4)
CREAT UR-MCNC: 78.03 MG/DL
EST. AVERAGE GLUCOSE BLD GHB EST-MCNC: 160 MG/DL
GFR SERPLBLD CREATININE-BSD FMLA CKD-EPI: 75 ML/MIN/1.73 M 2
GLOBULIN SER CALC-MCNC: 2.4 G/DL (ref 1.9–3.5)
GLUCOSE SERPL-MCNC: 108 MG/DL (ref 65–99)
HBA1C MFR BLD: 7.2 % (ref 4–5.6)
HDLC SERPL-MCNC: 54 MG/DL
LDLC SERPL CALC-MCNC: 57 MG/DL
MICROALBUMIN UR-MCNC: <1.2 MG/DL
MICROALBUMIN/CREAT UR: NORMAL MG/G (ref 0–30)
POTASSIUM SERPL-SCNC: 3.7 MMOL/L (ref 3.6–5.5)
PROT SERPL-MCNC: 6.8 G/DL (ref 6–8.2)
SODIUM SERPL-SCNC: 136 MMOL/L (ref 135–145)
T4 FREE SERPL-MCNC: 1.36 NG/DL (ref 0.93–1.7)
TRIGL SERPL-MCNC: 74 MG/DL (ref 0–149)
TSH SERPL-ACNC: 2.54 UIU/ML (ref 0.35–5.5)

## 2024-08-13 PROCEDURE — 83036 HEMOGLOBIN GLYCOSYLATED A1C: CPT

## 2024-08-13 PROCEDURE — 82570 ASSAY OF URINE CREATININE: CPT

## 2024-08-13 PROCEDURE — 84443 ASSAY THYROID STIM HORMONE: CPT

## 2024-08-13 PROCEDURE — 82306 VITAMIN D 25 HYDROXY: CPT

## 2024-08-13 PROCEDURE — 84439 ASSAY OF FREE THYROXINE: CPT

## 2024-08-13 PROCEDURE — 80061 LIPID PANEL: CPT

## 2024-08-13 PROCEDURE — 36415 COLL VENOUS BLD VENIPUNCTURE: CPT

## 2024-08-13 PROCEDURE — 80053 COMPREHEN METABOLIC PANEL: CPT

## 2024-08-13 PROCEDURE — 82043 UR ALBUMIN QUANTITATIVE: CPT

## 2024-08-21 ENCOUNTER — OFFICE VISIT (OUTPATIENT)
Dept: MEDICAL GROUP | Facility: PHYSICIAN GROUP | Age: 64
End: 2024-08-21
Payer: COMMERCIAL

## 2024-08-21 VITALS
TEMPERATURE: 97.6 F | SYSTOLIC BLOOD PRESSURE: 102 MMHG | OXYGEN SATURATION: 100 % | DIASTOLIC BLOOD PRESSURE: 60 MMHG | HEART RATE: 91 BPM | BODY MASS INDEX: 26.87 KG/M2 | RESPIRATION RATE: 16 BRPM | WEIGHT: 146 LBS | HEIGHT: 62 IN

## 2024-08-21 DIAGNOSIS — M19.90 ARTHRITIS: ICD-10-CM

## 2024-08-21 DIAGNOSIS — I15.2 HYPERTENSION ASSOCIATED WITH DIABETES (HCC): ICD-10-CM

## 2024-08-21 DIAGNOSIS — Z12.11 COLON CANCER SCREENING: ICD-10-CM

## 2024-08-21 DIAGNOSIS — Z12.31 ENCOUNTER FOR SCREENING MAMMOGRAM FOR MALIGNANT NEOPLASM OF BREAST: ICD-10-CM

## 2024-08-21 DIAGNOSIS — E11.65 TYPE 2 DIABETES MELLITUS WITH HYPERGLYCEMIA, WITHOUT LONG-TERM CURRENT USE OF INSULIN (HCC): ICD-10-CM

## 2024-08-21 DIAGNOSIS — E11.59 HYPERTENSION ASSOCIATED WITH DIABETES (HCC): ICD-10-CM

## 2024-08-21 DIAGNOSIS — E11.69 DYSLIPIDEMIA ASSOCIATED WITH TYPE 2 DIABETES MELLITUS (HCC): ICD-10-CM

## 2024-08-21 DIAGNOSIS — E78.5 DYSLIPIDEMIA ASSOCIATED WITH TYPE 2 DIABETES MELLITUS (HCC): ICD-10-CM

## 2024-08-21 PROCEDURE — 99214 OFFICE O/P EST MOD 30 MIN: CPT | Performed by: NURSE PRACTITIONER

## 2024-08-21 PROCEDURE — 3074F SYST BP LT 130 MM HG: CPT | Performed by: NURSE PRACTITIONER

## 2024-08-21 PROCEDURE — 3078F DIAST BP <80 MM HG: CPT | Performed by: NURSE PRACTITIONER

## 2024-08-21 RX ORDER — DAPAGLIFLOZIN 10 MG/1
10 TABLET, FILM COATED ORAL DAILY
Qty: 90 TABLET | Refills: 3 | Status: SHIPPED | OUTPATIENT
Start: 2024-08-21

## 2024-08-21 RX ORDER — ATORVASTATIN CALCIUM 20 MG/1
20 TABLET, FILM COATED ORAL DAILY
Qty: 90 TABLET | Refills: 3 | Status: SHIPPED | OUTPATIENT
Start: 2024-08-21

## 2024-08-21 RX ORDER — GLIPIZIDE 5 MG/1
5 TABLET ORAL 2 TIMES DAILY
Qty: 180 TABLET | Refills: 3 | Status: SHIPPED | OUTPATIENT
Start: 2024-08-21

## 2024-08-21 RX ORDER — MELOXICAM 15 MG/1
15 TABLET ORAL DAILY
Qty: 90 TABLET | Refills: 3 | Status: SHIPPED | OUTPATIENT
Start: 2024-08-21

## 2024-08-21 RX ORDER — LISINOPRIL AND HYDROCHLOROTHIAZIDE 20; 25 MG/1; MG/1
1 TABLET ORAL DAILY
Qty: 90 TABLET | Refills: 3 | Status: SHIPPED | OUTPATIENT
Start: 2024-08-21

## 2024-08-21 NOTE — PROGRESS NOTES
Family Nurse Practitioner Student Note    Cosigner/Preceptor: DARSHAN Candelaria    Chief Complaint:                                                                                                                                 Chief Complaint   Patient presents with    Diabetes Follow-up        HPI:   Tammy presents today for follow up on her diabetes. She is feeling well at this time and tolerating her current doses of her diabetes medications. She is not having any cost issues with any of her medications.     Current Outpatient Medications   Medication Sig Dispense Refill    atorvastatin (LIPITOR) 20 MG Tab Take 1 Tablet by mouth every day. 90 Tablet 3    dapagliflozin propanediol (FARXIGA) 10 MG Tab Take 1 Tablet by mouth every day. 90 Tablet 3    Dulaglutide 4.5 MG/0.5ML Solution Pen-injector Inject 0.5 mL under the skin every 7 days. 6 mL 3    glipiZIDE (GLUCOTROL) 5 MG Tab Take 1 Tablet by mouth 2 times a day. 180 Tablet 3    lisinopril-hydrochlorothiazide (PRINZIDE) 20-25 MG per tablet Take 1 Tablet by mouth every day. 90 Tablet 3    meloxicam (MOBIC) 15 MG tablet Take 1 Tablet by mouth every day. 90 Tablet 3    metformin (GLUCOPHAGE) 1000 MG tablet Take 1 Tablet by mouth 2 times a day with meals. 180 Tablet 3    Coenzyme Q10 (COQ10) 100 MG Cap Take  by mouth.      Vitamin D, Cholecalciferol, 1000 UNITS Cap Take  by mouth.      Cetirizine HCl (ZYRTEC ALLERGY PO) Take  by mouth.      Fish Oil Oil by Does not apply route.      POTASSIUM PO Take  by mouth.      Acetaminophen (TYLENOL PO) Take  by mouth.       No current facility-administered medications for this visit.       Allergies as of 08/21/2024 - Reviewed 08/21/2024   Allergen Reaction Noted    Januvia [sitagliptin] Unspecified 03/11/2015    Jardiance [empagliflozin]  03/27/2019    Sulfa drugs  06/03/2013    Tape  04/08/2016        ROS:  All systems negative expect as addressed in assessment and plan.     Assessment:  /60 (BP Location: Left  "arm, Patient Position: Sitting)   Pulse 91   Temp 36.4 °C (97.6 °F) (Temporal)   Resp 16   Ht 1.575 m (5' 2\")   Wt 66.2 kg (146 lb)   LMP 02/01/2016 Comment: tubal ligation 2002  SpO2 100%   BMI 26.70 kg/m²     Physical Exam  Constitutional:       Appearance: Normal appearance.   HENT:      Head: Normocephalic.      Nose: Nose normal.   Cardiovascular:      Rate and Rhythm: Normal rate.   Pulmonary:      Effort: Pulmonary effort is normal.   Musculoskeletal:         General: Normal range of motion.      Cervical back: Normal range of motion.   Feet:      Right foot:      Protective Sensation: 6 sites tested.  6 sites sensed.      Left foot:      Protective Sensation: 6 sites tested.  6 sites sensed.   Skin:     General: Skin is warm and dry.   Neurological:      Mental Status: She is alert and oriented to person, place, and time.   Psychiatric:         Mood and Affect: Mood normal.         Behavior: Behavior normal.         Thought Content: Thought content normal.         Judgment: Judgment normal.        Latest Reference Range & Units 06/06/22 11:07 09/19/22 10:56 02/09/24 11:43 08/13/24 09:51   Glycohemoglobin 4.0 - 5.6 % 6.5 ! 6.4 (H) 7.3 ! 7.2 (H)   !: Data is abnormal  (H): Data is abnormally high  Monofilament testing with a 10 gram force: sensation intact: intact bilaterally  Visual Inspection: Feet without maceration, ulcers, fissures.  Pedal pulses: intact bilaterally    Plan:  64 y.o. female with the following issues.    1. Dyslipidemia associated with type 2 diabetes mellitus (HCC)  atorvastatin (LIPITOR) 20 MG Tab    Chronic Stable    Continue atorvastatin 20 MG daily      2. Type 2 diabetes mellitus with hyperglycemia, without long-term current use of insulin (HCC)  dapagliflozin propanediol (FARXIGA) 10 MG Tab    Dulaglutide 4.5 MG/0.5ML Solution Pen-injector    glipiZIDE (GLUCOTROL) 5 MG Tab    metformin (GLUCOPHAGE) 1000 MG tablet    Chronic stable    Continue dulaglutide 4.5 MG weekly, " glipizide 5 MG BID, and metfromin 1000 BID with meals. Recheck A1C in 6 months.      3. Hypertension associated with diabetes (HCC)  lisinopril-hydrochlorothiazide (PRINZIDE) 20-25 MG per tablet    chronic stable    Continue lisinopril-hydrochlorothiazide 20-25 MG daily      4. Arthritis  meloxicam (MOBIC) 15 MG tablet    Chronic Stable    continue meloxicam 15 MG daily      5. Colon cancer screening  COLOGUARD (FIT DNA)    Cologuard ordered for colon cancer screening. Referral to GI if needed once results received      6. Encounter for screening mammogram for malignant neoplasm of breast  MA-SCREENING MAMMO BILAT W/TOMOSYNTHESIS W/CAD    Screening mammogram ordred.           Return in about 6 months (around 2/21/2025) for Diabetes Mellitus.

## 2024-10-14 ENCOUNTER — HOSPITAL ENCOUNTER (EMERGENCY)
Facility: MEDICAL CENTER | Age: 64
End: 2024-10-15
Attending: STUDENT IN AN ORGANIZED HEALTH CARE EDUCATION/TRAINING PROGRAM
Payer: COMMERCIAL

## 2024-10-14 ENCOUNTER — APPOINTMENT (OUTPATIENT)
Dept: RADIOLOGY | Facility: MEDICAL CENTER | Age: 64
End: 2024-10-14
Attending: STUDENT IN AN ORGANIZED HEALTH CARE EDUCATION/TRAINING PROGRAM
Payer: COMMERCIAL

## 2024-10-14 DIAGNOSIS — S63.502A SPRAIN OF LEFT WRIST, INITIAL ENCOUNTER: ICD-10-CM

## 2024-10-14 PROCEDURE — 73120 X-RAY EXAM OF HAND: CPT | Mod: LT

## 2024-10-14 PROCEDURE — 99284 EMERGENCY DEPT VISIT MOD MDM: CPT

## 2024-10-14 PROCEDURE — 73200 CT UPPER EXTREMITY W/O DYE: CPT | Mod: LT

## 2024-10-14 PROCEDURE — 73100 X-RAY EXAM OF WRIST: CPT | Mod: LT

## 2024-10-15 VITALS
HEART RATE: 69 BPM | TEMPERATURE: 97.1 F | BODY MASS INDEX: 27.06 KG/M2 | WEIGHT: 147.05 LBS | HEIGHT: 62 IN | SYSTOLIC BLOOD PRESSURE: 140 MMHG | RESPIRATION RATE: 17 BRPM | DIASTOLIC BLOOD PRESSURE: 66 MMHG | OXYGEN SATURATION: 95 %

## 2024-10-15 RX ORDER — ACETAMINOPHEN 500 MG
1000 TABLET ORAL EVERY 6 HOURS PRN
Qty: 30 TABLET | Refills: 0 | Status: SHIPPED | OUTPATIENT
Start: 2024-10-15

## 2024-11-05 NOTE — TELEPHONE ENCOUNTER
Received request via: Pharmacy    Was the patient seen in the last year in this department? Yes    Does the patient have an active prescription (recently filled or refills available) for medication(s) requested? No    Does the patient have care home Plus and need 100 day supply (blood pressure, diabetes and cholesterol meds only)? Patient does not have SCP   Surgery

## 2024-11-24 NOTE — TELEPHONE ENCOUNTER
Future Appointments       Provider Department Center    6/6/2018 9:45 AM Wendi Lind P.A.-C. [facility]        ESTABLISHED PATIENT PRE-VISIT PLANNING     Note: Patient will not be contacted if there is no indication to call.     1.  Reviewed notes from the last few office visits within the medical group: Yes    2.  If any orders were placed at last visit or intended to be done for this visit (i.e. 6 mos follow-up), do we have Results/Consult Notes?        •  Labs - Labs ordered, NOT completed. Patient advised to complete prior to next appointment.       •  Imaging - Imaging was not ordered at last office visit.       •  Referrals - No referrals were ordered at last office visit.    3. Is this appointment scheduled as a Hospital Follow-Up? No    4.  Immunizations were updated in Epic using WebIZ?: No WebIZ record       •  Web Iz Recommendations: HEPATITIS B, PNEUMOVAX (PPSV23) and TDAP    5.  Patient is due for the following Health Maintenance Topics:   Health Maintenance Due   Topic Date Due   • IMM HEP B VACCINE (1 of 3 - Primary Series) 1960   • IMM DTaP/Tdap/Td Vaccine (1 - Tdap) 06/18/1979   • IMM PNEUMOCOCCAL (1 of 1 - PPSV23) 06/18/1979   • DIABETES MONOFILAMENT / LE EXAM  02/10/2017   • MAMMOGRAM  03/20/2017   • RETINAL SCREENING  09/16/2017   • COLON CANCER SCREENING ANNUAL FIT  10/16/2017         6.  MDX printed for Provider? NO INS HT    7.  Patient was informed to arrive 15 min prior to their scheduled appointment and bring in their medication bottles. Confirmed through automated call   Negative

## 2024-12-02 ENCOUNTER — PATIENT MESSAGE (OUTPATIENT)
Dept: HEALTH INFORMATION MANAGEMENT | Facility: OTHER | Age: 64
End: 2024-12-02

## 2025-02-24 ENCOUNTER — OFFICE VISIT (OUTPATIENT)
Dept: MEDICAL GROUP | Facility: PHYSICIAN GROUP | Age: 65
End: 2025-02-24
Payer: COMMERCIAL

## 2025-02-24 ENCOUNTER — RESULTS FOLLOW-UP (OUTPATIENT)
Dept: MEDICAL GROUP | Facility: PHYSICIAN GROUP | Age: 65
End: 2025-02-24

## 2025-02-24 VITALS
OXYGEN SATURATION: 100 % | RESPIRATION RATE: 16 BRPM | HEIGHT: 62 IN | SYSTOLIC BLOOD PRESSURE: 102 MMHG | WEIGHT: 144 LBS | HEART RATE: 70 BPM | TEMPERATURE: 98.3 F | BODY MASS INDEX: 26.5 KG/M2 | DIASTOLIC BLOOD PRESSURE: 60 MMHG

## 2025-02-24 DIAGNOSIS — Z23 NEED FOR VACCINATION: ICD-10-CM

## 2025-02-24 DIAGNOSIS — E11.65 TYPE 2 DIABETES MELLITUS WITH HYPERGLYCEMIA, WITHOUT LONG-TERM CURRENT USE OF INSULIN (HCC): ICD-10-CM

## 2025-02-24 DIAGNOSIS — I15.2 HYPERTENSION ASSOCIATED WITH DIABETES (HCC): ICD-10-CM

## 2025-02-24 DIAGNOSIS — E11.69 DYSLIPIDEMIA ASSOCIATED WITH TYPE 2 DIABETES MELLITUS (HCC): ICD-10-CM

## 2025-02-24 DIAGNOSIS — E11.59 HYPERTENSION ASSOCIATED WITH DIABETES (HCC): ICD-10-CM

## 2025-02-24 DIAGNOSIS — E53.8 B12 DEFICIENCY: ICD-10-CM

## 2025-02-24 DIAGNOSIS — Z12.31 ENCOUNTER FOR SCREENING MAMMOGRAM FOR MALIGNANT NEOPLASM OF BREAST: ICD-10-CM

## 2025-02-24 DIAGNOSIS — E78.5 DYSLIPIDEMIA ASSOCIATED WITH TYPE 2 DIABETES MELLITUS (HCC): ICD-10-CM

## 2025-02-24 DIAGNOSIS — E55.9 VITAMIN D INSUFFICIENCY: ICD-10-CM

## 2025-02-24 DIAGNOSIS — Z13.29 SCREENING FOR THYROID DISORDER: ICD-10-CM

## 2025-02-24 LAB
HBA1C MFR BLD: 6.8 % (ref ?–5.8)
POCT INT CON NEG: NEGATIVE
POCT INT CON POS: POSITIVE

## 2025-02-24 PROCEDURE — 90656 IIV3 VACC NO PRSV 0.5 ML IM: CPT | Performed by: NURSE PRACTITIONER

## 2025-02-24 PROCEDURE — 3074F SYST BP LT 130 MM HG: CPT | Performed by: NURSE PRACTITIONER

## 2025-02-24 PROCEDURE — 90471 IMMUNIZATION ADMIN: CPT | Performed by: NURSE PRACTITIONER

## 2025-02-24 PROCEDURE — 83036 HEMOGLOBIN GLYCOSYLATED A1C: CPT | Performed by: NURSE PRACTITIONER

## 2025-02-24 PROCEDURE — 99214 OFFICE O/P EST MOD 30 MIN: CPT | Mod: 25 | Performed by: NURSE PRACTITIONER

## 2025-02-24 PROCEDURE — 3078F DIAST BP <80 MM HG: CPT | Performed by: NURSE PRACTITIONER

## 2025-02-24 ASSESSMENT — PATIENT HEALTH QUESTIONNAIRE - PHQ9: CLINICAL INTERPRETATION OF PHQ2 SCORE: 0

## 2025-02-24 NOTE — PROGRESS NOTES
"  Chief Complaint   Patient presents with   • Diabetes Follow-up                                                                                                                                       HPI:   Tammy presents today with the following.    ***    ROS:  All systems negative expect as addressed in assessment and plan.     /60 (BP Location: Left arm, Patient Position: Sitting)   Pulse 70   Temp 36.8 °C (98.3 °F) (Temporal)   Resp 16   Ht 1.575 m (5' 2\")   Wt 65.3 kg (144 lb)   LMP 02/01/2016 Comment: tubal ligation 2002  SpO2 100%   BMI 26.34 kg/m²     Objective:    Physical Exam      ***    Assessment and Plan:  64 y.o. female with the following issues.    1. Type 2 diabetes mellitus with hyperglycemia, without long-term current use of insulin (HCC)  - POCT Hemoglobin A1C  - MICROALBUMIN CREAT RATIO URINE; Future  - Comp Metabolic Panel; Future    2. Need for vaccination  - INFLUENZA VACCINE TRI INJ (PF)     3. Encounter for screening mammogram for malignant neoplasm of breast    4. Vitamin D insufficiency  - VITAMIN D,25 HYDROXY (DEFICIENCY); Future    5. Dyslipidemia associated with type 2 diabetes mellitus (HCC)  - Lipid Profile; Future    6. Hypertension associated with diabetes (HCC)  - MICROALBUMIN CREAT RATIO URINE; Future  - Comp Metabolic Panel; Future    7. B12 deficiency  - VITAMIN B12; Future    8. Screening for thyroid disorder  - FREE THYROXINE; Future  - TSH; Future        ***    Return in about 6 months (around 8/24/2025) for Diabetes.    I spent a total of *** minutes with record review, exam, and communication with the patient, communication with other providers, and documentation of this encounter. This does not include time spent on separately billable procedures/tests.    Please note that this dictation was created using voice recognition software. I have worked with consultants from the vendor as well as technical experts from [employer] to optimize the interface. I " have made every reasonable attempt to correct obvious errors, but I expect that there are errors of grammar and possibly content that I did not discover before finalizing the note.

## 2025-03-06 ENCOUNTER — OFFICE VISIT (OUTPATIENT)
Dept: URGENT CARE | Facility: PHYSICIAN GROUP | Age: 65
End: 2025-03-06
Payer: COMMERCIAL

## 2025-03-06 VITALS
DIASTOLIC BLOOD PRESSURE: 64 MMHG | HEART RATE: 76 BPM | BODY MASS INDEX: 26.68 KG/M2 | HEIGHT: 62 IN | TEMPERATURE: 98.6 F | SYSTOLIC BLOOD PRESSURE: 110 MMHG | OXYGEN SATURATION: 98 % | RESPIRATION RATE: 18 BRPM | WEIGHT: 145 LBS

## 2025-03-06 DIAGNOSIS — J06.9 VIRAL URI WITH COUGH: ICD-10-CM

## 2025-03-06 PROCEDURE — 3074F SYST BP LT 130 MM HG: CPT | Performed by: NURSE PRACTITIONER

## 2025-03-06 PROCEDURE — 3078F DIAST BP <80 MM HG: CPT | Performed by: NURSE PRACTITIONER

## 2025-03-06 PROCEDURE — 99213 OFFICE O/P EST LOW 20 MIN: CPT | Performed by: NURSE PRACTITIONER

## 2025-03-06 ASSESSMENT — ENCOUNTER SYMPTOMS
CHILLS: 0
SHORTNESS OF BREATH: 0
NAUSEA: 0
HEADACHES: 1
FEVER: 0
MYALGIAS: 0
COUGH: 1
SORE THROAT: 0
DIARRHEA: 0
SPUTUM PRODUCTION: 1
ORTHOPNEA: 0
EYE DISCHARGE: 0
WHEEZING: 0

## 2025-03-06 NOTE — PROGRESS NOTES
Subjective     Tammy Alejo is a 64 y.o. female who presents with URI (Cough x 5 days )            HPI  New problem.  Patient is a very pleasant 64-year-old female who presents with upper respiratory symptoms for the past 5 days.  She reports cough, nasal congestion, and headache.  She states she feels the cough is worsened today.  She denies fever, chills, myalgia, nausea, or diarrhea.  She has been taking over-the-counter combination cough and cold for her symptoms.    Januvia [sitagliptin], Jardiance [empagliflozin], Sulfa drugs, and Tape  Current Outpatient Medications on File Prior to Visit   Medication Sig Dispense Refill    atorvastatin (LIPITOR) 20 MG Tab Take 1 Tablet by mouth every day. 90 Tablet 3    dapagliflozin propanediol (FARXIGA) 10 MG Tab Take 1 Tablet by mouth every day. 90 Tablet 3    Dulaglutide 4.5 MG/0.5ML Solution Pen-injector Inject 0.5 mL under the skin every 7 days. 6 mL 3    glipiZIDE (GLUCOTROL) 5 MG Tab Take 1 Tablet by mouth 2 times a day. 180 Tablet 3    lisinopril-hydrochlorothiazide (PRINZIDE) 20-25 MG per tablet Take 1 Tablet by mouth every day. 90 Tablet 3    meloxicam (MOBIC) 15 MG tablet Take 1 Tablet by mouth every day. 90 Tablet 3    metformin (GLUCOPHAGE) 1000 MG tablet Take 1 Tablet by mouth 2 times a day with meals. 180 Tablet 3    Coenzyme Q10 (COQ10) 100 MG Cap Take  by mouth.      Vitamin D, Cholecalciferol, 1000 UNITS Cap Take  by mouth.      Cetirizine HCl (ZYRTEC ALLERGY PO) Take  by mouth.      Fish Oil Oil by Does not apply route.      POTASSIUM PO Take  by mouth.      acetaminophen (TYLENOL) 500 MG Tab Take 2 Tablets by mouth every 6 hours as needed for Mild Pain or Moderate Pain. (Patient not taking: Reported on 3/6/2025) 30 Tablet 0     No current facility-administered medications on file prior to visit.     Social History     Socioeconomic History    Marital status:      Spouse name: Not on file    Number of children: 1    Years of education:  Not on file    Highest education level: 12th grade   Occupational History    Occupation: [employer]     Employer: [employer]   Tobacco Use    Smoking status: Never    Smokeless tobacco: Never   Vaping Use    Vaping status: Never Used   Substance and Sexual Activity    Alcohol use: No    Drug use: No    Sexual activity: Yes     Partners: Male     Birth control/protection: Surgical   Other Topics Concern    Not on file   Social History Narrative    Not on file     Social Drivers of Health     Financial Resource Strain: Patient Declined (7/19/2023)    Overall Financial Resource Strain (CARDIA)     Difficulty of Paying Living Expenses: Patient declined   Food Insecurity: Patient Declined (7/19/2023)    Hunger Vital Sign     Worried About Running Out of Food in the Last Year: Patient declined     Ran Out of Food in the Last Year: Patient declined   Transportation Needs: Patient Declined (7/19/2023)    PRAPARE - Transportation     Lack of Transportation (Medical): Patient declined     Lack of Transportation (Non-Medical): Patient declined   Physical Activity: Sufficiently Active (7/19/2023)    Exercise Vital Sign     Days of Exercise per Week: 5 days     Minutes of Exercise per Session: 150+ min   Stress: No Stress Concern Present (7/19/2023)    [place] [employer] - Occupational Stress Questionnaire     Feeling of Stress : Not at all   Social Connections: Unknown (7/19/2023)    Social Connection and Isolation Panel [NHANES]     Frequency of Communication with Friends and Family: Once a week     Frequency of Social Gatherings with Friends and Family: Patient declined     Attends Mandaeism Services: Never     Active Member of Clubs or Organizations: No     Attends Club or Organization Meetings: Never     Marital Status:    Intimate Partner Violence: Not on file   Housing Stability: Unknown (7/19/2023)    Housing Stability Vital Sign     Unable to Pay for Housing in the Last Year: Patient refused      "Number of Places Lived in the Last Year: Not on file     Unstable Housing in the Last Year: Patient refused     Breast Cancer-related family history is not on file.      Review of Systems   Constitutional:  Positive for malaise/fatigue. Negative for chills and fever.   HENT:  Positive for congestion. Negative for sore throat.    Eyes:  Negative for discharge.   Respiratory:  Positive for cough and sputum production. Negative for shortness of breath and wheezing.    Cardiovascular:  Negative for chest pain and orthopnea.   Gastrointestinal:  Negative for diarrhea and nausea.   Musculoskeletal:  Negative for myalgias.   Neurological:  Positive for headaches.   Endo/Heme/Allergies:  Negative for environmental allergies.   All other systems reviewed and are negative.             Objective     /64 (BP Location: Right arm, Patient Position: Sitting, BP Cuff Size: Adult)   Pulse 76   Temp 37 °C (98.6 °F) (Temporal)   Resp 18   Ht 1.575 m (5' 2\")   Wt 65.8 kg (145 lb)   LMP 02/01/2016 Comment: tubal ligation 2002  SpO2 98%   BMI 26.52 kg/m²      Physical Exam  Vitals and nursing note reviewed.   Constitutional:       General: She is not in acute distress.     Appearance: She is well-developed.   HENT:      Head: Normocephalic.      Right Ear: Tympanic membrane and external ear normal.      Left Ear: Tympanic membrane and external ear normal.      Nose: Mucosal edema, congestion and rhinorrhea present.      Mouth/Throat:      Pharynx: No posterior oropharyngeal erythema.   Eyes:      General:         Right eye: No discharge.         Left eye: No discharge.      Conjunctiva/sclera: Conjunctivae normal.   Cardiovascular:      Rate and Rhythm: Normal rate and regular rhythm.      Heart sounds: Normal heart sounds.   Pulmonary:      Effort: Pulmonary effort is normal.      Breath sounds: Normal breath sounds. No wheezing or rales.   Musculoskeletal:         General: Normal range of motion.      Cervical back: " Normal range of motion and neck supple.   Lymphadenopathy:      Cervical: No cervical adenopathy.   Skin:     General: Skin is warm and dry.   Neurological:      Mental Status: She is alert and oriented to person, place, and time.   Psychiatric:         Behavior: Behavior normal.         Thought Content: Thought content normal.                                  Assessment & Plan  Viral URI with cough  Viral illness at this time with no indication for antibiotics. Reviewed with patient expected course of illness and also reviewed OTC medications that may be used for symptom relief. Follow up 7-10 days if not improving.

## 2025-03-18 ENCOUNTER — HOSPITAL ENCOUNTER (OUTPATIENT)
Dept: LAB | Facility: MEDICAL CENTER | Age: 65
End: 2025-03-18
Attending: NURSE PRACTITIONER
Payer: COMMERCIAL

## 2025-03-18 DIAGNOSIS — E55.9 VITAMIN D INSUFFICIENCY: ICD-10-CM

## 2025-03-18 DIAGNOSIS — I15.2 HYPERTENSION ASSOCIATED WITH DIABETES (HCC): ICD-10-CM

## 2025-03-18 DIAGNOSIS — E11.69 DYSLIPIDEMIA ASSOCIATED WITH TYPE 2 DIABETES MELLITUS (HCC): ICD-10-CM

## 2025-03-18 DIAGNOSIS — Z13.29 SCREENING FOR THYROID DISORDER: ICD-10-CM

## 2025-03-18 DIAGNOSIS — E11.59 HYPERTENSION ASSOCIATED WITH DIABETES (HCC): ICD-10-CM

## 2025-03-18 DIAGNOSIS — E11.65 TYPE 2 DIABETES MELLITUS WITH HYPERGLYCEMIA, WITHOUT LONG-TERM CURRENT USE OF INSULIN (HCC): ICD-10-CM

## 2025-03-18 DIAGNOSIS — E78.5 DYSLIPIDEMIA ASSOCIATED WITH TYPE 2 DIABETES MELLITUS (HCC): ICD-10-CM

## 2025-03-18 DIAGNOSIS — E53.8 B12 DEFICIENCY: ICD-10-CM

## 2025-03-18 LAB
25(OH)D3 SERPL-MCNC: 52 NG/ML (ref 30–100)
ALBUMIN SERPL BCP-MCNC: 4.6 G/DL (ref 3.2–4.9)
ALBUMIN/GLOB SERPL: 1.6 G/DL
ALP SERPL-CCNC: 57 U/L (ref 30–99)
ALT SERPL-CCNC: 18 U/L (ref 2–50)
ANION GAP SERPL CALC-SCNC: 11 MMOL/L (ref 7–16)
AST SERPL-CCNC: 25 U/L (ref 12–45)
BILIRUB SERPL-MCNC: 0.8 MG/DL (ref 0.1–1.5)
BUN SERPL-MCNC: 22 MG/DL (ref 8–22)
CALCIUM ALBUM COR SERPL-MCNC: 9.6 MG/DL (ref 8.5–10.5)
CALCIUM SERPL-MCNC: 10.1 MG/DL (ref 8.5–10.5)
CHLORIDE SERPL-SCNC: 98 MMOL/L (ref 96–112)
CHOLEST SERPL-MCNC: 140 MG/DL (ref 100–199)
CO2 SERPL-SCNC: 29 MMOL/L (ref 20–33)
CREAT SERPL-MCNC: 0.93 MG/DL (ref 0.5–1.4)
GFR SERPLBLD CREATININE-BSD FMLA CKD-EPI: 68 ML/MIN/1.73 M 2
GLOBULIN SER CALC-MCNC: 2.9 G/DL (ref 1.9–3.5)
GLUCOSE SERPL-MCNC: 105 MG/DL (ref 65–99)
HDLC SERPL-MCNC: 65 MG/DL
LDLC SERPL CALC-MCNC: 47 MG/DL
POTASSIUM SERPL-SCNC: 3.9 MMOL/L (ref 3.6–5.5)
PROT SERPL-MCNC: 7.5 G/DL (ref 6–8.2)
SODIUM SERPL-SCNC: 138 MMOL/L (ref 135–145)
T4 FREE SERPL-MCNC: 1.22 NG/DL (ref 0.93–1.7)
TRIGL SERPL-MCNC: 142 MG/DL (ref 0–149)
TSH SERPL-ACNC: 4.23 UIU/ML (ref 0.35–5.5)
VIT B12 SERPL-MCNC: 1017 PG/ML (ref 211–911)

## 2025-03-18 PROCEDURE — 80061 LIPID PANEL: CPT

## 2025-03-18 PROCEDURE — 84443 ASSAY THYROID STIM HORMONE: CPT

## 2025-03-18 PROCEDURE — 36415 COLL VENOUS BLD VENIPUNCTURE: CPT

## 2025-03-18 PROCEDURE — 84439 ASSAY OF FREE THYROXINE: CPT

## 2025-03-18 PROCEDURE — 82607 VITAMIN B-12: CPT

## 2025-03-18 PROCEDURE — 80053 COMPREHEN METABOLIC PANEL: CPT

## 2025-03-18 PROCEDURE — 82570 ASSAY OF URINE CREATININE: CPT

## 2025-03-18 PROCEDURE — 82043 UR ALBUMIN QUANTITATIVE: CPT

## 2025-03-18 PROCEDURE — 82306 VITAMIN D 25 HYDROXY: CPT

## 2025-03-19 LAB
CREAT UR-MCNC: 125 MG/DL
MICROALBUMIN UR-MCNC: <1.2 MG/DL
MICROALBUMIN/CREAT UR: NORMAL MG/G (ref 0–30)

## 2025-04-16 ENCOUNTER — RESULTS FOLLOW-UP (OUTPATIENT)
Dept: MEDICAL GROUP | Facility: PHYSICIAN GROUP | Age: 65
End: 2025-04-16

## 2025-10-23 ENCOUNTER — APPOINTMENT (OUTPATIENT)
Dept: MEDICAL GROUP | Facility: PHYSICIAN GROUP | Age: 65
End: 2025-10-23
Payer: COMMERCIAL